# Patient Record
Sex: FEMALE | Race: WHITE | Employment: OTHER | URBAN - METROPOLITAN AREA
[De-identification: names, ages, dates, MRNs, and addresses within clinical notes are randomized per-mention and may not be internally consistent; named-entity substitution may affect disease eponyms.]

---

## 2018-04-01 ENCOUNTER — APPOINTMENT (OUTPATIENT)
Dept: GENERAL RADIOLOGY | Age: 73
DRG: 193 | End: 2018-04-01
Payer: MEDICARE

## 2018-04-01 ENCOUNTER — HOSPITAL ENCOUNTER (INPATIENT)
Age: 73
LOS: 2 days | Discharge: HOME HEALTH | DRG: 193 | End: 2018-04-03
Attending: EMERGENCY MEDICINE | Admitting: INTERNAL MEDICINE
Payer: MEDICARE

## 2018-04-01 ENCOUNTER — APPOINTMENT (OUTPATIENT)
Dept: CT IMAGING | Age: 73
DRG: 193 | End: 2018-04-01
Payer: MEDICARE

## 2018-04-01 DIAGNOSIS — J18.9 COMMUNITY ACQUIRED PNEUMONIA, UNSPECIFIED LATERALITY: ICD-10-CM

## 2018-04-01 DIAGNOSIS — E87.6 HYPOKALEMIA: ICD-10-CM

## 2018-04-01 DIAGNOSIS — J96.01 ACUTE RESPIRATORY FAILURE WITH HYPOXIA (HCC): Primary | ICD-10-CM

## 2018-04-01 LAB
ALBUMIN SERPL-MCNC: 4.3 G/DL (ref 3.5–5.2)
ALP BLD-CCNC: 127 U/L (ref 35–104)
ALT SERPL-CCNC: 24 U/L (ref 0–32)
ANION GAP SERPL CALCULATED.3IONS-SCNC: 12 MMOL/L (ref 7–16)
AST SERPL-CCNC: 23 U/L (ref 0–31)
BACTERIA: NORMAL /HPF
BASOPHILS ABSOLUTE: 0.06 E9/L (ref 0–0.2)
BASOPHILS RELATIVE PERCENT: 0.3 % (ref 0–2)
BILIRUB SERPL-MCNC: 1.1 MG/DL (ref 0–1.2)
BILIRUBIN URINE: NEGATIVE
BLOOD, URINE: ABNORMAL
BUN BLDV-MCNC: 13 MG/DL (ref 8–23)
CALCIUM SERPL-MCNC: 9.7 MG/DL (ref 8.6–10.2)
CHLORIDE BLD-SCNC: 96 MMOL/L (ref 98–107)
CLARITY: CLEAR
CO2: 29 MMOL/L (ref 22–29)
COLOR: YELLOW
CREAT SERPL-MCNC: 0.5 MG/DL (ref 0.5–1)
EOSINOPHILS ABSOLUTE: 0 E9/L (ref 0.05–0.5)
EOSINOPHILS RELATIVE PERCENT: 0 % (ref 0–6)
EPITHELIAL CELLS, UA: NORMAL /HPF
GFR AFRICAN AMERICAN: >60
GFR NON-AFRICAN AMERICAN: >60 ML/MIN/1.73
GLUCOSE BLD-MCNC: 128 MG/DL (ref 74–109)
GLUCOSE URINE: NEGATIVE MG/DL
HCT VFR BLD CALC: 46.1 % (ref 34–48)
HEMOGLOBIN: 15.6 G/DL (ref 11.5–15.5)
IMMATURE GRANULOCYTES #: 0.14 E9/L
IMMATURE GRANULOCYTES %: 0.7 % (ref 0–5)
INFLUENZA A BY PCR: NOT DETECTED
INFLUENZA B BY PCR: NOT DETECTED
KETONES, URINE: NEGATIVE MG/DL
LACTIC ACID: 1.3 MMOL/L (ref 0.5–2.2)
LEUKOCYTE ESTERASE, URINE: NEGATIVE
LIPASE: 14 U/L (ref 13–60)
LYMPHOCYTES ABSOLUTE: 2.57 E9/L (ref 1.5–4)
LYMPHOCYTES RELATIVE PERCENT: 12.7 % (ref 20–42)
MCH RBC QN AUTO: 30.5 PG (ref 26–35)
MCHC RBC AUTO-ENTMCNC: 33.8 % (ref 32–34.5)
MCV RBC AUTO: 90 FL (ref 80–99.9)
MONOCYTES ABSOLUTE: 1.49 E9/L (ref 0.1–0.95)
MONOCYTES RELATIVE PERCENT: 7.4 % (ref 2–12)
NEUTROPHILS ABSOLUTE: 15.91 E9/L (ref 1.8–7.3)
NEUTROPHILS RELATIVE PERCENT: 78.9 % (ref 43–80)
NITRITE, URINE: NEGATIVE
PDW BLD-RTO: 13.3 FL (ref 11.5–15)
PH UA: 8 (ref 5–9)
PLATELET # BLD: 328 E9/L (ref 130–450)
PMV BLD AUTO: 10.4 FL (ref 7–12)
POTASSIUM SERPL-SCNC: 3 MMOL/L (ref 3.5–5)
PROTEIN UA: ABNORMAL MG/DL
RBC # BLD: 5.12 E12/L (ref 3.5–5.5)
RBC UA: NORMAL /HPF (ref 0–2)
SODIUM BLD-SCNC: 137 MMOL/L (ref 132–146)
SPECIFIC GRAVITY UA: 1.01 (ref 1–1.03)
TOTAL PROTEIN: 7.8 G/DL (ref 6.4–8.3)
TROPONIN: <0.01 NG/ML (ref 0–0.03)
UROBILINOGEN, URINE: 0.2 E.U./DL
WBC # BLD: 20.2 E9/L (ref 4.5–11.5)
WBC UA: NORMAL /HPF (ref 0–5)

## 2018-04-01 PROCEDURE — 96365 THER/PROPH/DIAG IV INF INIT: CPT

## 2018-04-01 PROCEDURE — 85025 COMPLETE CBC W/AUTO DIFF WBC: CPT

## 2018-04-01 PROCEDURE — 71046 X-RAY EXAM CHEST 2 VIEWS: CPT

## 2018-04-01 PROCEDURE — 6370000000 HC RX 637 (ALT 250 FOR IP): Performed by: EMERGENCY MEDICINE

## 2018-04-01 PROCEDURE — 84484 ASSAY OF TROPONIN QUANT: CPT

## 2018-04-01 PROCEDURE — 87040 BLOOD CULTURE FOR BACTERIA: CPT

## 2018-04-01 PROCEDURE — 96375 TX/PRO/DX INJ NEW DRUG ADDON: CPT

## 2018-04-01 PROCEDURE — 80053 COMPREHEN METABOLIC PANEL: CPT

## 2018-04-01 PROCEDURE — 6360000004 HC RX CONTRAST MEDICATION: Performed by: RADIOLOGY

## 2018-04-01 PROCEDURE — 6370000000 HC RX 637 (ALT 250 FOR IP): Performed by: FAMILY MEDICINE

## 2018-04-01 PROCEDURE — 87502 INFLUENZA DNA AMP PROBE: CPT

## 2018-04-01 PROCEDURE — 87081 CULTURE SCREEN ONLY: CPT

## 2018-04-01 PROCEDURE — 36415 COLL VENOUS BLD VENIPUNCTURE: CPT

## 2018-04-01 PROCEDURE — 1200000000 HC SEMI PRIVATE

## 2018-04-01 PROCEDURE — 87088 URINE BACTERIA CULTURE: CPT

## 2018-04-01 PROCEDURE — 81001 URINALYSIS AUTO W/SCOPE: CPT

## 2018-04-01 PROCEDURE — 87450 HC DIRECT STREP B ANTIGEN: CPT

## 2018-04-01 PROCEDURE — 6360000002 HC RX W HCPCS: Performed by: EMERGENCY MEDICINE

## 2018-04-01 PROCEDURE — 71275 CT ANGIOGRAPHY CHEST: CPT

## 2018-04-01 PROCEDURE — 2580000003 HC RX 258: Performed by: EMERGENCY MEDICINE

## 2018-04-01 PROCEDURE — 99285 EMERGENCY DEPT VISIT HI MDM: CPT

## 2018-04-01 PROCEDURE — 83605 ASSAY OF LACTIC ACID: CPT

## 2018-04-01 PROCEDURE — 83690 ASSAY OF LIPASE: CPT

## 2018-04-01 PROCEDURE — 2500000003 HC RX 250 WO HCPCS: Performed by: EMERGENCY MEDICINE

## 2018-04-01 RX ORDER — ATORVASTATIN CALCIUM 20 MG/1
20 TABLET, FILM COATED ORAL DAILY
Status: DISCONTINUED | OUTPATIENT
Start: 2018-04-01 | End: 2018-04-03 | Stop reason: HOSPADM

## 2018-04-01 RX ORDER — METHYLPREDNISOLONE SODIUM SUCCINATE 125 MG/2ML
125 INJECTION, POWDER, LYOPHILIZED, FOR SOLUTION INTRAMUSCULAR; INTRAVENOUS ONCE
Status: COMPLETED | OUTPATIENT
Start: 2018-04-01 | End: 2018-04-01

## 2018-04-01 RX ORDER — LANOLIN ALCOHOL/MO/W.PET/CERES
3 CREAM (GRAM) TOPICAL
Status: ACTIVE | OUTPATIENT
Start: 2018-04-01 | End: 2018-04-01

## 2018-04-01 RX ORDER — KETOROLAC TROMETHAMINE 15 MG/ML
15 INJECTION, SOLUTION INTRAMUSCULAR; INTRAVENOUS ONCE
Status: COMPLETED | OUTPATIENT
Start: 2018-04-01 | End: 2018-04-01

## 2018-04-01 RX ORDER — DIPHENHYDRAMINE HYDROCHLORIDE 50 MG/ML
50 INJECTION INTRAMUSCULAR; INTRAVENOUS ONCE
Status: COMPLETED | OUTPATIENT
Start: 2018-04-01 | End: 2018-04-01

## 2018-04-01 RX ORDER — POTASSIUM CHLORIDE 7.45 MG/ML
10 INJECTION INTRAVENOUS ONCE
Status: COMPLETED | OUTPATIENT
Start: 2018-04-01 | End: 2018-04-01

## 2018-04-01 RX ORDER — HYDROCODONE BITARTRATE AND ACETAMINOPHEN 10; 325 MG/1; MG/1
1 TABLET ORAL EVERY 6 HOURS PRN
Status: DISCONTINUED | OUTPATIENT
Start: 2018-04-01 | End: 2018-04-03 | Stop reason: HOSPADM

## 2018-04-01 RX ORDER — POTASSIUM BICARBONATE 25 MEQ/1
50 TABLET, EFFERVESCENT ORAL ONCE
Status: COMPLETED | OUTPATIENT
Start: 2018-04-01 | End: 2018-04-01

## 2018-04-01 RX ORDER — LEVOTHYROXINE SODIUM 0.07 MG/1
75 TABLET ORAL DAILY
Status: DISCONTINUED | OUTPATIENT
Start: 2018-04-01 | End: 2018-04-03 | Stop reason: HOSPADM

## 2018-04-01 RX ORDER — LISINOPRIL AND HYDROCHLOROTHIAZIDE 20; 12.5 MG/1; MG/1
1 TABLET ORAL DAILY
Status: DISCONTINUED | OUTPATIENT
Start: 2018-04-01 | End: 2018-04-03 | Stop reason: HOSPADM

## 2018-04-01 RX ORDER — PANTOPRAZOLE SODIUM 40 MG/1
40 TABLET, DELAYED RELEASE ORAL
Status: DISCONTINUED | OUTPATIENT
Start: 2018-04-02 | End: 2018-04-03 | Stop reason: HOSPADM

## 2018-04-01 RX ORDER — IPRATROPIUM BROMIDE AND ALBUTEROL SULFATE 2.5; .5 MG/3ML; MG/3ML
1 SOLUTION RESPIRATORY (INHALATION) EVERY 4 HOURS PRN
Status: DISCONTINUED | OUTPATIENT
Start: 2018-04-01 | End: 2018-04-02

## 2018-04-01 RX ORDER — MORPHINE SULFATE 5 MG/ML
4 INJECTION, SOLUTION INTRAMUSCULAR; INTRAVENOUS ONCE
Status: COMPLETED | OUTPATIENT
Start: 2018-04-01 | End: 2018-04-01

## 2018-04-01 RX ORDER — POTASSIUM CHLORIDE 20 MEQ/1
20 TABLET, EXTENDED RELEASE ORAL DAILY
COMMUNITY
End: 2020-10-30

## 2018-04-01 RX ORDER — POTASSIUM CHLORIDE 20 MEQ/1
20 TABLET, EXTENDED RELEASE ORAL DAILY
Status: DISCONTINUED | OUTPATIENT
Start: 2018-04-01 | End: 2018-04-03 | Stop reason: HOSPADM

## 2018-04-01 RX ADMIN — ATORVASTATIN CALCIUM 20 MG: 20 TABLET, FILM COATED ORAL at 15:44

## 2018-04-01 RX ADMIN — MORPHINE SULFATE 4 MG: 5 INJECTION, SOLUTION INTRAMUSCULAR; INTRAVENOUS at 09:25

## 2018-04-01 RX ADMIN — DIPHENHYDRAMINE HYDROCHLORIDE 50 MG: 50 INJECTION, SOLUTION INTRAMUSCULAR; INTRAVENOUS at 10:39

## 2018-04-01 RX ADMIN — DOXYCYCLINE 100 MG: 100 INJECTION, POWDER, LYOPHILIZED, FOR SOLUTION INTRAVENOUS at 13:13

## 2018-04-01 RX ADMIN — POTASSIUM BICARBONATE 50 MEQ: 25 TABLET, EFFERVESCENT ORAL at 10:50

## 2018-04-01 RX ADMIN — HYDROCODONE BITARTRATE AND ACETAMINOPHEN 1 TABLET: 10; 325 TABLET ORAL at 18:27

## 2018-04-01 RX ADMIN — LEVOTHYROXINE SODIUM 75 MCG: 75 TABLET ORAL at 15:44

## 2018-04-01 RX ADMIN — POTASSIUM CHLORIDE 10 MEQ: 10 INJECTION, SOLUTION INTRAVENOUS at 10:50

## 2018-04-01 RX ADMIN — KETOROLAC TROMETHAMINE 15 MG: 15 INJECTION, SOLUTION INTRAMUSCULAR; INTRAVENOUS at 09:25

## 2018-04-01 RX ADMIN — METHYLPREDNISOLONE SODIUM SUCCINATE 125 MG: 125 INJECTION, POWDER, FOR SOLUTION INTRAMUSCULAR; INTRAVENOUS at 10:39

## 2018-04-01 RX ADMIN — POTASSIUM CHLORIDE 20 MEQ: 20 TABLET, EXTENDED RELEASE ORAL at 14:52

## 2018-04-01 RX ADMIN — WATER 2 G: 1 INJECTION INTRAMUSCULAR; INTRAVENOUS; SUBCUTANEOUS at 13:01

## 2018-04-01 RX ADMIN — IOPAMIDOL 80 ML: 755 INJECTION, SOLUTION INTRAVENOUS at 12:10

## 2018-04-01 RX ADMIN — LISINOPRIL AND HYDROCHLOROTHIAZIDE 1 TABLET: 12.5; 2 TABLET ORAL at 15:44

## 2018-04-01 ASSESSMENT — ENCOUNTER SYMPTOMS
VOMITING: 0
COUGH: 0
CHEST TIGHTNESS: 0
WHEEZING: 0
SORE THROAT: 0
SHORTNESS OF BREATH: 0
CONSTIPATION: 0
BLOOD IN STOOL: 0
ABDOMINAL PAIN: 0
TROUBLE SWALLOWING: 0
NAUSEA: 0
DIARRHEA: 0
RHINORRHEA: 0

## 2018-04-01 ASSESSMENT — PAIN DESCRIPTION - DESCRIPTORS
DESCRIPTORS: ACHING;DISCOMFORT;DULL
DESCRIPTORS: ACHING;DISCOMFORT;NAGGING
DESCRIPTORS: SHARP

## 2018-04-01 ASSESSMENT — PAIN DESCRIPTION - PAIN TYPE
TYPE: ACUTE PAIN

## 2018-04-01 ASSESSMENT — PAIN DESCRIPTION - ORIENTATION
ORIENTATION: RIGHT;LEFT
ORIENTATION: RIGHT;LEFT

## 2018-04-01 ASSESSMENT — PAIN DESCRIPTION - ONSET
ONSET: ON-GOING
ONSET: ON-GOING

## 2018-04-01 ASSESSMENT — PAIN SCALES - GENERAL
PAINLEVEL_OUTOF10: 10
PAINLEVEL_OUTOF10: 9
PAINLEVEL_OUTOF10: 10
PAINLEVEL_OUTOF10: 5
PAINLEVEL_OUTOF10: 4

## 2018-04-01 ASSESSMENT — PAIN DESCRIPTION - LOCATION
LOCATION: RIB CAGE
LOCATION: CHEST
LOCATION: CHEST
LOCATION: RIB CAGE

## 2018-04-01 ASSESSMENT — PAIN DESCRIPTION - FREQUENCY
FREQUENCY: CONTINUOUS

## 2018-04-02 ENCOUNTER — APPOINTMENT (OUTPATIENT)
Dept: GENERAL RADIOLOGY | Age: 73
DRG: 193 | End: 2018-04-02
Payer: MEDICARE

## 2018-04-02 LAB
ALBUMIN SERPL-MCNC: 3.7 G/DL (ref 3.5–5.2)
ALP BLD-CCNC: 120 U/L (ref 35–104)
ALT SERPL-CCNC: 51 U/L (ref 0–32)
ANION GAP SERPL CALCULATED.3IONS-SCNC: 12 MMOL/L (ref 7–16)
AST SERPL-CCNC: 35 U/L (ref 0–31)
BASOPHILS ABSOLUTE: 0 E9/L (ref 0–0.2)
BASOPHILS RELATIVE PERCENT: 0.2 % (ref 0–2)
BILIRUB SERPL-MCNC: 0.5 MG/DL (ref 0–1.2)
BUN BLDV-MCNC: 18 MG/DL (ref 8–23)
CALCIUM SERPL-MCNC: 9.6 MG/DL (ref 8.6–10.2)
CHLORIDE BLD-SCNC: 98 MMOL/L (ref 98–107)
CO2: 27 MMOL/L (ref 22–29)
CREAT SERPL-MCNC: 0.5 MG/DL (ref 0.5–1)
EOSINOPHILS ABSOLUTE: 0 E9/L (ref 0.05–0.5)
EOSINOPHILS RELATIVE PERCENT: 0 % (ref 0–6)
FILM ARRAY ADENOVIRUS: NORMAL
FILM ARRAY BORDETELLA PERTUSSIS: NORMAL
FILM ARRAY CHLAMYDOPHILIA PNEUMONIAE: NORMAL
FILM ARRAY CORONAVIRUS 229E: NORMAL
FILM ARRAY CORONAVIRUS HKU1: NORMAL
FILM ARRAY CORONAVIRUS NL63: NORMAL
FILM ARRAY CORONAVIRUS OC43: NORMAL
FILM ARRAY INFLUENZA A VIRUS 09H1: NORMAL
FILM ARRAY INFLUENZA A VIRUS H1: NORMAL
FILM ARRAY INFLUENZA A VIRUS H3: NORMAL
FILM ARRAY INFLUENZA A VIRUS: NORMAL
FILM ARRAY INFLUENZA B: NORMAL
FILM ARRAY METAPNEUMOVIRUS: NORMAL
FILM ARRAY MYCOPLASMA PNEUMONIAE: NORMAL
FILM ARRAY PARAINFLUENZA VIRUS 1: NORMAL
FILM ARRAY PARAINFLUENZA VIRUS 2: NORMAL
FILM ARRAY PARAINFLUENZA VIRUS 3: NORMAL
FILM ARRAY PARAINFLUENZA VIRUS 4: NORMAL
FILM ARRAY RESPIRATORY SYNCITIAL VIRUS: NORMAL
FILM ARRAY RHINOVIRUS/ENTEROVIRUS: NORMAL
GAMMA GLUTAMYL TRANSFERASE: 65 U/L (ref 6–42)
GFR AFRICAN AMERICAN: >60
GFR NON-AFRICAN AMERICAN: >60 ML/MIN/1.73
GLUCOSE BLD-MCNC: 123 MG/DL (ref 74–109)
HCT VFR BLD CALC: 42.3 % (ref 34–48)
HEMOGLOBIN: 14.3 G/DL (ref 11.5–15.5)
INFLUENZA A BY PCR: NOT DETECTED
INFLUENZA B BY PCR: NOT DETECTED
L. PNEUMOPHILA SEROGP 1 UR AG: NORMAL
LYMPHOCYTES ABSOLUTE: 3.03 E9/L (ref 1.5–4)
LYMPHOCYTES RELATIVE PERCENT: 11 % (ref 20–42)
MCH RBC QN AUTO: 30.6 PG (ref 26–35)
MCHC RBC AUTO-ENTMCNC: 33.8 % (ref 32–34.5)
MCV RBC AUTO: 90.6 FL (ref 80–99.9)
MONOCYTES ABSOLUTE: 1.1 E9/L (ref 0.1–0.95)
MONOCYTES RELATIVE PERCENT: 3.5 % (ref 2–12)
NEUTROPHILS ABSOLUTE: 23.65 E9/L (ref 1.8–7.3)
NEUTROPHILS RELATIVE PERCENT: 85.5 % (ref 43–80)
PDW BLD-RTO: 13.4 FL (ref 11.5–15)
PLATELET # BLD: 332 E9/L (ref 130–450)
PMV BLD AUTO: 10.7 FL (ref 7–12)
POIKILOCYTES: ABNORMAL
POTASSIUM SERPL-SCNC: 3.6 MMOL/L (ref 3.5–5)
PROCALCITONIN: 0.06 NG/ML (ref 0–0.08)
RBC # BLD: 4.67 E12/L (ref 3.5–5.5)
SODIUM BLD-SCNC: 137 MMOL/L (ref 132–146)
STREP PNEUMONIAE ANTIGEN, URINE: NORMAL
TARGET CELLS: ABNORMAL
TOTAL PROTEIN: 7.2 G/DL (ref 6.4–8.3)
TSH SERPL DL<=0.05 MIU/L-ACNC: 1.24 UIU/ML (ref 0.27–4.2)
WBC # BLD: 27.5 E9/L (ref 4.5–11.5)

## 2018-04-02 PROCEDURE — 87581 M.PNEUMON DNA AMP PROBE: CPT

## 2018-04-02 PROCEDURE — 87486 CHLMYD PNEUM DNA AMP PROBE: CPT

## 2018-04-02 PROCEDURE — 36415 COLL VENOUS BLD VENIPUNCTURE: CPT

## 2018-04-02 PROCEDURE — 6370000000 HC RX 637 (ALT 250 FOR IP): Performed by: INTERNAL MEDICINE

## 2018-04-02 PROCEDURE — 82977 ASSAY OF GGT: CPT

## 2018-04-02 PROCEDURE — 2500000003 HC RX 250 WO HCPCS: Performed by: INTERNAL MEDICINE

## 2018-04-02 PROCEDURE — 2580000003 HC RX 258: Performed by: INTERNAL MEDICINE

## 2018-04-02 PROCEDURE — 87503 INFLUENZA DNA AMP PROB ADDL: CPT

## 2018-04-02 PROCEDURE — 1200000000 HC SEMI PRIVATE

## 2018-04-02 PROCEDURE — 6370000000 HC RX 637 (ALT 250 FOR IP): Performed by: FAMILY MEDICINE

## 2018-04-02 PROCEDURE — 87798 DETECT AGENT NOS DNA AMP: CPT

## 2018-04-02 PROCEDURE — 87501 INFLUENZA DNA AMP PROB 1+: CPT

## 2018-04-02 PROCEDURE — 85025 COMPLETE CBC W/AUTO DIFF WBC: CPT

## 2018-04-02 PROCEDURE — 80074 ACUTE HEPATITIS PANEL: CPT

## 2018-04-02 PROCEDURE — 80053 COMPREHEN METABOLIC PANEL: CPT

## 2018-04-02 PROCEDURE — 84443 ASSAY THYROID STIM HORMONE: CPT

## 2018-04-02 PROCEDURE — 71046 X-RAY EXAM CHEST 2 VIEWS: CPT

## 2018-04-02 PROCEDURE — 84145 PROCALCITONIN (PCT): CPT

## 2018-04-02 PROCEDURE — 6360000002 HC RX W HCPCS: Performed by: INTERNAL MEDICINE

## 2018-04-02 PROCEDURE — 94640 AIRWAY INHALATION TREATMENT: CPT

## 2018-04-02 RX ORDER — IPRATROPIUM BROMIDE AND ALBUTEROL SULFATE 2.5; .5 MG/3ML; MG/3ML
1 SOLUTION RESPIRATORY (INHALATION) EVERY 4 HOURS
Status: DISCONTINUED | OUTPATIENT
Start: 2018-04-02 | End: 2018-04-03 | Stop reason: HOSPADM

## 2018-04-02 RX ADMIN — LISINOPRIL AND HYDROCHLOROTHIAZIDE 1 TABLET: 12.5; 2 TABLET ORAL at 09:33

## 2018-04-02 RX ADMIN — IPRATROPIUM BROMIDE AND ALBUTEROL SULFATE 1 AMPULE: .5; 3 SOLUTION RESPIRATORY (INHALATION) at 17:35

## 2018-04-02 RX ADMIN — POTASSIUM CHLORIDE 20 MEQ: 20 TABLET, EXTENDED RELEASE ORAL at 09:25

## 2018-04-02 RX ADMIN — IPRATROPIUM BROMIDE AND ALBUTEROL SULFATE 1 AMPULE: .5; 3 SOLUTION RESPIRATORY (INHALATION) at 14:18

## 2018-04-02 RX ADMIN — LEVOTHYROXINE SODIUM 75 MCG: 75 TABLET ORAL at 06:28

## 2018-04-02 RX ADMIN — HYDROCODONE BITARTRATE AND ACETAMINOPHEN 1 TABLET: 10; 325 TABLET ORAL at 09:34

## 2018-04-02 RX ADMIN — ATORVASTATIN CALCIUM 20 MG: 20 TABLET, FILM COATED ORAL at 09:24

## 2018-04-02 RX ADMIN — CEFTRIAXONE 1 G: 1 INJECTION, POWDER, FOR SOLUTION INTRAMUSCULAR; INTRAVENOUS at 23:13

## 2018-04-02 RX ADMIN — HYDROCODONE BITARTRATE AND ACETAMINOPHEN 1 TABLET: 10; 325 TABLET ORAL at 19:02

## 2018-04-02 RX ADMIN — PANTOPRAZOLE SODIUM 40 MG: 40 TABLET, DELAYED RELEASE ORAL at 06:28

## 2018-04-02 RX ADMIN — DOXYCYCLINE 100 MG: 100 INJECTION, POWDER, LYOPHILIZED, FOR SOLUTION INTRAVENOUS at 11:02

## 2018-04-02 RX ADMIN — IPRATROPIUM BROMIDE AND ALBUTEROL SULFATE 1 AMPULE: .5; 3 SOLUTION RESPIRATORY (INHALATION) at 23:25

## 2018-04-02 ASSESSMENT — PAIN DESCRIPTION - FREQUENCY
FREQUENCY: INTERMITTENT
FREQUENCY: INTERMITTENT

## 2018-04-02 ASSESSMENT — PAIN DESCRIPTION - LOCATION
LOCATION: ABDOMEN;RIB CAGE
LOCATION: ABDOMEN

## 2018-04-02 ASSESSMENT — PAIN SCALES - GENERAL
PAINLEVEL_OUTOF10: 0
PAINLEVEL_OUTOF10: 0
PAINLEVEL_OUTOF10: 9
PAINLEVEL_OUTOF10: 3
PAINLEVEL_OUTOF10: 8

## 2018-04-02 ASSESSMENT — PAIN DESCRIPTION - PROGRESSION: CLINICAL_PROGRESSION: GRADUALLY WORSENING

## 2018-04-02 ASSESSMENT — PAIN DESCRIPTION - PAIN TYPE
TYPE: ACUTE PAIN
TYPE: ACUTE PAIN

## 2018-04-02 ASSESSMENT — PAIN DESCRIPTION - DESCRIPTORS
DESCRIPTORS: ACHING;DISCOMFORT;DULL
DESCRIPTORS: ACHING;DULL

## 2018-04-02 ASSESSMENT — PAIN DESCRIPTION - ORIENTATION
ORIENTATION: RIGHT;LEFT
ORIENTATION: RIGHT;LEFT

## 2018-04-02 ASSESSMENT — PAIN DESCRIPTION - ONSET
ONSET: ON-GOING
ONSET: ON-GOING

## 2018-04-03 VITALS
RESPIRATION RATE: 20 BRPM | OXYGEN SATURATION: 93 % | TEMPERATURE: 98.6 F | SYSTOLIC BLOOD PRESSURE: 128 MMHG | HEART RATE: 90 BPM | WEIGHT: 115.5 LBS | DIASTOLIC BLOOD PRESSURE: 68 MMHG | BODY MASS INDEX: 24.24 KG/M2 | HEIGHT: 58 IN

## 2018-04-03 LAB
ALBUMIN SERPL-MCNC: 3.4 G/DL (ref 3.5–5.2)
ALP BLD-CCNC: 103 U/L (ref 35–104)
ALT SERPL-CCNC: 41 U/L (ref 0–32)
ANION GAP SERPL CALCULATED.3IONS-SCNC: 10 MMOL/L (ref 7–16)
AST SERPL-CCNC: 28 U/L (ref 0–31)
BILIRUB SERPL-MCNC: 0.4 MG/DL (ref 0–1.2)
BUN BLDV-MCNC: 17 MG/DL (ref 8–23)
CALCIUM SERPL-MCNC: 8.8 MG/DL (ref 8.6–10.2)
CHLORIDE BLD-SCNC: 102 MMOL/L (ref 98–107)
CO2: 31 MMOL/L (ref 22–29)
CREAT SERPL-MCNC: 0.6 MG/DL (ref 0.5–1)
GFR AFRICAN AMERICAN: >60
GFR NON-AFRICAN AMERICAN: >60 ML/MIN/1.73
GLUCOSE BLD-MCNC: 90 MG/DL (ref 74–109)
HAV IGM SER IA-ACNC: NORMAL
HCT VFR BLD CALC: 38.8 % (ref 34–48)
HEMOGLOBIN: 12.8 G/DL (ref 11.5–15.5)
HEPATITIS B CORE IGM ANTIBODY: NORMAL
HEPATITIS B SURFACE ANTIGEN INTERPRETATION: NORMAL
HEPATITIS C ANTIBODY INTERPRETATION: NORMAL
MCH RBC QN AUTO: 30.5 PG (ref 26–35)
MCHC RBC AUTO-ENTMCNC: 33 % (ref 32–34.5)
MCV RBC AUTO: 92.6 FL (ref 80–99.9)
MRSA CULTURE ONLY: NORMAL
PDW BLD-RTO: 13.6 FL (ref 11.5–15)
PLATELET # BLD: 317 E9/L (ref 130–450)
PMV BLD AUTO: 10.8 FL (ref 7–12)
POTASSIUM SERPL-SCNC: 3.5 MMOL/L (ref 3.5–5)
RBC # BLD: 4.19 E12/L (ref 3.5–5.5)
SODIUM BLD-SCNC: 143 MMOL/L (ref 132–146)
TOTAL PROTEIN: 6.4 G/DL (ref 6.4–8.3)
URINE CULTURE, ROUTINE: NORMAL
WBC # BLD: 21.4 E9/L (ref 4.5–11.5)

## 2018-04-03 PROCEDURE — 2580000003 HC RX 258: Performed by: INTERNAL MEDICINE

## 2018-04-03 PROCEDURE — 85027 COMPLETE CBC AUTOMATED: CPT

## 2018-04-03 PROCEDURE — 36415 COLL VENOUS BLD VENIPUNCTURE: CPT

## 2018-04-03 PROCEDURE — 2500000003 HC RX 250 WO HCPCS: Performed by: INTERNAL MEDICINE

## 2018-04-03 PROCEDURE — 80053 COMPREHEN METABOLIC PANEL: CPT

## 2018-04-03 PROCEDURE — 94640 AIRWAY INHALATION TREATMENT: CPT

## 2018-04-03 PROCEDURE — 6370000000 HC RX 637 (ALT 250 FOR IP): Performed by: FAMILY MEDICINE

## 2018-04-03 RX ORDER — LEVOFLOXACIN 500 MG/1
500 TABLET, FILM COATED ORAL DAILY
Qty: 7 TABLET | Refills: 0 | Status: SHIPPED | OUTPATIENT
Start: 2018-04-03 | End: 2018-04-10

## 2018-04-03 RX ORDER — ALBUTEROL SULFATE 90 UG/1
2 AEROSOL, METERED RESPIRATORY (INHALATION) EVERY 4 HOURS PRN
Qty: 1 INHALER | Refills: 3 | Status: SHIPPED | OUTPATIENT
Start: 2018-04-03 | End: 2020-10-30

## 2018-04-03 RX ADMIN — ATORVASTATIN CALCIUM 20 MG: 20 TABLET, FILM COATED ORAL at 08:36

## 2018-04-03 RX ADMIN — PANTOPRAZOLE SODIUM 40 MG: 40 TABLET, DELAYED RELEASE ORAL at 06:13

## 2018-04-03 RX ADMIN — HYDROCODONE BITARTRATE AND ACETAMINOPHEN 1 TABLET: 10; 325 TABLET ORAL at 01:59

## 2018-04-03 RX ADMIN — IPRATROPIUM BROMIDE AND ALBUTEROL SULFATE 1 AMPULE: .5; 3 SOLUTION RESPIRATORY (INHALATION) at 06:32

## 2018-04-03 RX ADMIN — HYDROCODONE BITARTRATE AND ACETAMINOPHEN 1 TABLET: 10; 325 TABLET ORAL at 08:35

## 2018-04-03 RX ADMIN — IPRATROPIUM BROMIDE AND ALBUTEROL SULFATE 1 AMPULE: .5; 3 SOLUTION RESPIRATORY (INHALATION) at 09:36

## 2018-04-03 RX ADMIN — POTASSIUM CHLORIDE 20 MEQ: 20 TABLET, EXTENDED RELEASE ORAL at 08:36

## 2018-04-03 RX ADMIN — IPRATROPIUM BROMIDE AND ALBUTEROL SULFATE 1 AMPULE: .5; 3 SOLUTION RESPIRATORY (INHALATION) at 13:41

## 2018-04-03 RX ADMIN — HYDROCODONE BITARTRATE AND ACETAMINOPHEN 1 TABLET: 10; 325 TABLET ORAL at 14:22

## 2018-04-03 RX ADMIN — LEVOTHYROXINE SODIUM 75 MCG: 75 TABLET ORAL at 06:13

## 2018-04-03 RX ADMIN — LISINOPRIL AND HYDROCHLOROTHIAZIDE 1 TABLET: 12.5; 2 TABLET ORAL at 08:36

## 2018-04-03 ASSESSMENT — PAIN DESCRIPTION - PROGRESSION
CLINICAL_PROGRESSION: GRADUALLY WORSENING
CLINICAL_PROGRESSION: NOT CHANGED

## 2018-04-03 ASSESSMENT — PAIN DESCRIPTION - ONSET
ONSET: ON-GOING

## 2018-04-03 ASSESSMENT — PAIN DESCRIPTION - LOCATION
LOCATION: NECK;SHOULDER

## 2018-04-03 ASSESSMENT — PAIN DESCRIPTION - PAIN TYPE
TYPE: ACUTE PAIN

## 2018-04-03 ASSESSMENT — PAIN DESCRIPTION - ORIENTATION
ORIENTATION: RIGHT;LEFT

## 2018-04-03 ASSESSMENT — PAIN SCALES - GENERAL
PAINLEVEL_OUTOF10: 9
PAINLEVEL_OUTOF10: 7
PAINLEVEL_OUTOF10: 0
PAINLEVEL_OUTOF10: 7
PAINLEVEL_OUTOF10: 5
PAINLEVEL_OUTOF10: 3
PAINLEVEL_OUTOF10: 9
PAINLEVEL_OUTOF10: 0

## 2018-04-03 ASSESSMENT — PAIN DESCRIPTION - DESCRIPTORS
DESCRIPTORS: ACHING;DULL
DESCRIPTORS: ACHING;DULL;HEADACHE;DISCOMFORT
DESCRIPTORS: ACHING;DISCOMFORT;DULL;HEADACHE
DESCRIPTORS: ACHING;DULL;DISCOMFORT

## 2018-04-03 ASSESSMENT — PAIN DESCRIPTION - FREQUENCY
FREQUENCY: INTERMITTENT

## 2018-04-06 LAB
BLOOD CULTURE, ROUTINE: NORMAL
CULTURE, BLOOD 2: NORMAL

## 2018-05-03 LAB
EKG ATRIAL RATE: 85 BPM
EKG P AXIS: 26 DEGREES
EKG P-R INTERVAL: 144 MS
EKG Q-T INTERVAL: 396 MS
EKG QRS DURATION: 80 MS
EKG QTC CALCULATION (BAZETT): 471 MS
EKG R AXIS: -34 DEGREES
EKG T AXIS: -9 DEGREES
EKG VENTRICULAR RATE: 85 BPM

## 2020-10-30 ENCOUNTER — OFFICE VISIT (OUTPATIENT)
Dept: FAMILY MEDICINE CLINIC | Age: 75
End: 2020-10-30
Payer: MEDICARE

## 2020-10-30 VITALS
TEMPERATURE: 97.3 F | RESPIRATION RATE: 22 BRPM | OXYGEN SATURATION: 94 % | BODY MASS INDEX: 24.75 KG/M2 | DIASTOLIC BLOOD PRESSURE: 106 MMHG | HEIGHT: 57 IN | SYSTOLIC BLOOD PRESSURE: 158 MMHG | HEART RATE: 112 BPM | WEIGHT: 114.7 LBS

## 2020-10-30 PROCEDURE — 99203 OFFICE O/P NEW LOW 30 MIN: CPT | Performed by: FAMILY MEDICINE

## 2020-10-30 RX ORDER — RIZATRIPTAN BENZOATE 10 MG/1
10 TABLET ORAL
COMMUNITY
End: 2021-02-24 | Stop reason: SDUPTHER

## 2020-10-30 RX ORDER — DULOXETIN HYDROCHLORIDE 30 MG/1
30 CAPSULE, DELAYED RELEASE ORAL DAILY
Qty: 30 CAPSULE | Refills: 2 | Status: SHIPPED
Start: 2020-10-30 | End: 2020-11-06 | Stop reason: ALTCHOICE

## 2020-10-30 ASSESSMENT — PATIENT HEALTH QUESTIONNAIRE - PHQ9
SUM OF ALL RESPONSES TO PHQ QUESTIONS 1-9: 2
2. FEELING DOWN, DEPRESSED OR HOPELESS: 1
SUM OF ALL RESPONSES TO PHQ QUESTIONS 1-9: 2
SUM OF ALL RESPONSES TO PHQ9 QUESTIONS 1 & 2: 2
1. LITTLE INTEREST OR PLEASURE IN DOING THINGS: 1
SUM OF ALL RESPONSES TO PHQ QUESTIONS 1-9: 2

## 2020-10-30 NOTE — PROGRESS NOTES
Subjective:  70 y.o. female who presents to the office today with chief complaint:  Chief Complaint   Patient presents with    New Patient     Previously seeing Dr. Jen madison. Neck pain: Chronic. Cervical discectomy and fusion 5 years ago with worsened symptoms. Had seen pain management without improvement. Was taking Norco, however, this will not be prescribed here. Unable to move head. Difficulty completing housekeeping tasks at home. Uses hot pack and cold pack frequently with minimal improvement. Past Medical History: chronic back pain, hypertension, hypothyroidism, hyperlipidemia, migraine headaches, chronic left hip pain    Medications:   lisinopril-HCTZ 20-12.5 mg, Synthroid 75 mcg daily, atorvastatin 20 mg daily, omeprazole 20mg daily Maxalt 10mg prn    Allergies: Iodide, peanuts, penicillin    Family History: Younger sister with breast CA. Parents : Mother: CAD. Father: Old age. Social:    Education: GED. Employment: Retired. Diet: Described as Good- 3 meals. Eats fruits, vegetables. Exercise: None. Caffeine: Cup coffee in AM and afternoon. Tobacco: smokes 1/4 pack per day- started at 27. Averaged a pack per day. Alcohol: None.      Health Maintenance Due   Topic Date Due    Meningococcal (ACWY) vaccine (1 - Risk start before 7 months 4-dose series) 1947    Hib vaccine (1 of 1 - Risk 1-dose series) 1948    Lipid screen  1957    Meningococcal B vaccine (1 of 4 - Increased Risk Bexsero 2-dose series) 1957    DTaP/Tdap/Td vaccine (1 - Tdap) 1966    Breast cancer screen  1997    Shingles Vaccine (1 of 2) 1997    Colon cancer screen colonoscopy  1997    DEXA (modify frequency per FRAX score)  2002    Pneumococcal 65+ yrs at Risk Vaccine (2 of 2 - PPSV23) 2019    Potassium monitoring  2019    Creatinine monitoring  2019    Annual Wellness Visit (AWV)  2019    Flu vaccine (1) 09/01/2020       Cancer History: None. Family Cancer History: Sister- breast.     Review of Systems    Review of Systems: All bolded are positive, all others are negative. General:  Fever, chills, diaphoresis, fatigue, malaise, night sweats, weight loss  Psychological:  Anxiety, disorientation, hallucinations. ENT:  Epistaxis, headaches, vertigo, visual changes. Cardiovascular:  Chest pain, irregular heartbeats, palpitations, paroxysmal nocturnal dyspnea. Respiratory:  Shortness of breath, coughing, sputum production, hemoptysis, wheezing, orthopnea. Gastrointestinal:  Nausea, vomiting, diarrhea, heartburn, constipation, abdominal pain, hematemesis, hematochezia, melena, acholic stools  Genito-Urinary:  Dysuria, urgency, frequency, hematuria  Musculoskeletal:  Joint pain, joint stiffness, joint swelling, muscle pain  Neurology:  Headache, focal neurological deficits, weakness, numbness, paresthesia  Derm:  Rashes, ulcers, excoriations, bruising  Extremities:  Decreased ROM, peripheral edema, mottling      Objective:  Vitals:    10/30/20 1015   BP: (!) 158/106   Pulse:    Resp:    Temp:    SpO2:    Took BP meds today. States blood pressure typically well controlled. Does not check at home. Physical Exam  Constitutional:       General: She is not in acute distress. Appearance: She is well-developed. She is not diaphoretic. HENT:      Head: Normocephalic and atraumatic. Right Ear: External ear normal.      Left Ear: External ear normal.      Nose: Nose normal.      Mouth/Throat:      Pharynx: No oropharyngeal exudate. Eyes:      General:         Right eye: No discharge. Left eye: No discharge. Conjunctiva/sclera: Conjunctivae normal.      Pupils: Pupils are equal, round, and reactive to light. Neck:      Musculoskeletal: Normal range of motion and neck supple. Cardiovascular:      Rate and Rhythm: Normal rate and regular rhythm.       Heart sounds: Normal heart sounds. No murmur. No friction rub. No gallop. Pulmonary:      Effort: Pulmonary effort is normal. No respiratory distress. Breath sounds: Normal breath sounds. No wheezing or rales. Abdominal:      General: Bowel sounds are normal. There is no distension. Palpations: Abdomen is soft. Tenderness: There is no abdominal tenderness. There is no guarding or rebound. Musculoskeletal:      Comments: Extremely limited range of motion in all planes of cervical spine. Left shoulder range of motion limited in all planes. Lymphadenopathy:      Cervical: No cervical adenopathy. Neurological:      Mental Status: She is alert and oriented to person, place, and time. Comments: Normal sensation in bilateral upper extremities. 5/5 strength upper and lower extremities. Psychiatric:         Mood and Affect: Mood normal.         Behavior: Behavior normal.         Thought Content: Thought content normal.         Judgment: Judgment normal.             Assessment and Plan:  Adrian Sandhu was seen today for new patient. Diagnoses and all orders for this visit:    DDD (degenerative disc disease), cervical  -     DULoxetine (CYMBALTA) 30 MG extended release capsule; Take 1 capsule by mouth daily  -     Nadya Ayala MD, Pain Medicine, [place] [address]. Disc disease cervical spine: Add fusion 5 years ago by Dr. Sam Lutz. Having worse pain overall since. Previously prescribed Norco by last PCP. Attempt Cymbalta 30 mg daily. Referral made to Dr. Tish Maciel with pain management. Follow-up in 4 weeks    Patient may come in sooner if needed for medical concerns. Patient advised to call at any time to cancel or re-scheduleor for any questions/concerns. Please note that >15 minutes was spent face-to-face with the patient gathering history, performing physical exam, discussing findings, counseling the patient, and determining planforward. All questions and concerns addressed and answered.          Madhavi Elizalde HAILY Hernandez DO   10/30/20  10:41 AM

## 2020-11-06 ENCOUNTER — OFFICE VISIT (OUTPATIENT)
Dept: PAIN MANAGEMENT | Age: 75
End: 2020-11-06
Payer: MEDICARE

## 2020-11-06 VITALS
WEIGHT: 114 LBS | BODY MASS INDEX: 24.59 KG/M2 | HEIGHT: 57 IN | SYSTOLIC BLOOD PRESSURE: 150 MMHG | HEART RATE: 109 BPM | DIASTOLIC BLOOD PRESSURE: 88 MMHG | TEMPERATURE: 97.3 F | RESPIRATION RATE: 18 BRPM

## 2020-11-06 PROCEDURE — G8427 DOCREV CUR MEDS BY ELIG CLIN: HCPCS | Performed by: ANESTHESIOLOGY

## 2020-11-06 PROCEDURE — G8420 CALC BMI NORM PARAMETERS: HCPCS | Performed by: ANESTHESIOLOGY

## 2020-11-06 PROCEDURE — 4004F PT TOBACCO SCREEN RCVD TLK: CPT | Performed by: ANESTHESIOLOGY

## 2020-11-06 PROCEDURE — 4040F PNEUMOC VAC/ADMIN/RCVD: CPT | Performed by: ANESTHESIOLOGY

## 2020-11-06 PROCEDURE — 3017F COLORECTAL CA SCREEN DOC REV: CPT | Performed by: ANESTHESIOLOGY

## 2020-11-06 PROCEDURE — G8484 FLU IMMUNIZE NO ADMIN: HCPCS | Performed by: ANESTHESIOLOGY

## 2020-11-06 PROCEDURE — 1090F PRES/ABSN URINE INCON ASSESS: CPT | Performed by: ANESTHESIOLOGY

## 2020-11-06 PROCEDURE — 99204 OFFICE O/P NEW MOD 45 MIN: CPT | Performed by: ANESTHESIOLOGY

## 2020-11-06 PROCEDURE — 1123F ACP DISCUSS/DSCN MKR DOCD: CPT | Performed by: ANESTHESIOLOGY

## 2020-11-06 PROCEDURE — G8400 PT W/DXA NO RESULTS DOC: HCPCS | Performed by: ANESTHESIOLOGY

## 2020-11-06 RX ORDER — METHYLPREDNISOLONE 4 MG/1
TABLET ORAL
Qty: 1 KIT | Refills: 0 | Status: SHIPPED | OUTPATIENT
Start: 2020-11-06 | End: 2020-11-12

## 2020-11-06 NOTE — PROGRESS NOTES
Patient:  RODGER Ferrell 1945  Date of Service:  20      Do you currently have any of the following:    Fever: No  Headache:  No  Cough: Yes  Shortness of breath: No  Exposed to anyone with these symptoms: No       Patient presents with complaints of neck and lower back pain that started 6 months ago and has been getting worse. She states the pain began following Trauma    Pain is constant and is described as aching, throbbing and sharp. She rates the pain as a 10/10 on her worst day , 4/10 on her best day, and a 6/10 on average on the VAS scale. Pain does not radiate to na. She  does not have nna of the na. Alleviating factors include: heat. Aggravating factors include:  bending, lifting. She states that the pain does keep her from sleeping at night. She took her last dose of generic pain OTC today. .     She is not on NSAIDS and  is not on anticoagulation medications to include none and is managed by . Previous treatments: Physical Therapy, Nerve block, Epidural Steroid Injection and medications. . By Dr Richard Newsome Expectations from this treatment: decrease pain    BP (!) 150/88   Pulse 109   Temp 97.3 °F (36.3 °C) (Infrared)   Resp 18   Ht 4' 9\" (1.448 m)   Wt 114 lb (51.7 kg)   BMI 24.67 kg/m²     No LMP recorded.  Patient is postmenopausal.

## 2020-11-06 NOTE — PROGRESS NOTES
[facility]        [address], [address] [facility]      [phone]                  Consult Note      Patient:  RODGER Mckenzie 1945    Date of Service:  20     Requesting Physician:  Tere Bernabe, *    Reason for Consult:      Patient presents with complaints of chronic neck pain    HISTORY OF PRESENT ILLNESS:      Ms. Raudel Fields is a 70 y.o. female [facility] of  Chronic neck pain. Pain for > 10 years. Prior H/o ACDF 10 yrs ago. Has been having chronic neck pain Right side > left. Denies UE radiation. Denies UE tingling / numbness or Bowel or bladder symptoms. Has been treated by Dr. Ethan Cheung with chronic opioids. She has been off opioids for few months. She was getting Hydrocodone 10/325 tid- last prescribed by Dr. Karthik Lopez on 2020. She has been treated in the past by Dr. Sandrea Carrel - had LS interventions SIJ injection / RFA. Pain is constant and is described as aching, stabbing and throbbing. Pain does not radiate to the upper and lower extremities. She  does not have numbness, tingling, weakness of the upper extremities     Patient does not have bladder or bowel dysfunction. Alleviating factors include: rest.  Aggravating factors include: movement, bending, lifting. Pain causes functional limitations/ limits Adl's : Yes    Nursing notes and details of the pain history reviewed. Please see intake notes for details.     Previous treatments:   Physical Therapy : yes, in the past- none recently     Medications: - NSAID's : yes             - Membrane stabilizers : no            - Opioids : yes, Has been on chronic opioids for years by Dr. Ethan Cheung            - Adjuvants or Others : yes- Cymbalta stopped     TENS Unit: no    Surgeries: yes, prior ACDF    Interventional Pain procedures/ nerve blocks: yes, years ago    She has not been on Comments)     migraines    Penicillins      Doesn't recall reaction       Social History     Socioeconomic History    Marital status:      Spouse name: Not on file    Number of children: Not on file    Years of education: Not on file    Highest education level: Not on file   Occupational History    Not on file   Social Needs    Financial resource strain: Not on file    Food insecurity     Worry: Not on file     Inability: Not on file    Transportation needs     Medical: Not on file     Non-medical: Not on file   Tobacco Use    Smoking status: Current Every Day Smoker     Packs/day: 0.50     Years: 50.00     Pack years: 25.00     Types: Cigarettes    Smokeless tobacco: Never Used   Substance and Sexual Activity    Alcohol use: No    Drug use: No    Sexual activity: Not Currently   Lifestyle    Physical activity     Days per week: Not on file     Minutes per session: Not on file    Stress: Not on file   Relationships    Social connections     Talks on phone: Not on file     Gets together: Not on file     Attends [employer] service: Not on file     Active member of club or organization: Not on file     Attends meetings of clubs or organizations: Not on file     Relationship status: Not on file    Intimate partner violence     Fear of current or ex partner: Not on file     Emotionally abused: Not on file     Physically abused: Not on file     Forced sexual activity: Not on file   Other Topics Concern    Not on file   Social History Narrative    Not on file       Family History   Problem Relation Age of Onset    Arthritis Other     Heart Disease Other     High Blood Pressure Other        REVIEW OF SYSTEMS:     Patient specifically denies fever/chills, chest pain, shortness of breath, new bowel or bladder complaints. All other review of systems was negative. Review of Systems - documented in the intake sheet reviewed.     PHYSICAL EXAMINATION:      BP (!) 150/88   Pulse 109   Temp 97.3 °F (36.3 °C) (Infrared)   Resp 18   Ht 4' 9\" (1.448 m)   Wt 114 lb (51.7 kg)   BMI 24.67 kg/m²     General:      General appearance:  Pleasant and well-hydrated, in no distress and A & O x 3  Build:Normal Weight  Function: Rises from seated position easily and Moves about room without difficulty    HEENT:    Head:normocephalic, atraumatic  Pupils:regular, round, equal  Sclera: icterus absent    Lungs:    Breathing:normal breathing pattern     CVS:     RRR    Abdomen:    Shape:non-distended and normal  Tenderness:none  Guarding:none    Cervical spine:    Inspection:normal  Palpation:tenderness paravertebral muscles, tenderness trapezium, left, right and positive. Range of motion:reduced flexion, extension, rotation bilaterally and is painful. Spurling's: negative bilaterally  Facet tenderness + on the right side    Thoracic spine:     Spine inspection:normal   Palpation:No tenderness over the midline and paraspinal area, bilaterally  Range of motion:normal in flexion, extension rotation bilateral and is not painful.     Lumbar spine:    Spine inspection: Normal   Palpation: Tenderness paravertebral muscles No bilaterally  Range of motion: Decreased  Sacroiliac joint tenderness No bilaterally  Piriformis tenderness: negative bilaterally  SLR : negative bilaterally  Trochanteric bursa tenderness: negative bilaterally  CVA tenderness:No     Musculoskeletal:    Trigger points in trapezius: No bilaterally  Trigger points in rhomboids: No bilaterally  Trigger points in Paravertebral: Yes right    Extremities:    Tremors:None bilaterally upper and lower  Edema:none x all 4 extremities    Knee:    ROM : no pain   Joint line tenderness : no    Neurological:    Sensory: Normal to light touch    Motor:   Right  5/5              Left  5/5               Right Bicep 5/5           Left Bicep 5/5              Right Triceps 5/5       Left Triceps 5/5          Right Deltoid 5/5     Left Deltoid 5/5                  Right Quadriceps 5/5          Left Quadriceps 5/5           Right Gastrocnemius 5/5    Left Gastrocnemius 5/5  Right Ant Tibialis 5/5  Left Ant Tibialis 5/5    Reflexes:    Right Brachioradialis reflex 2+  Left Brachioradialis reflex 2+  Right Biceps reflex 2+  Left Biceps reflex 2+  Right Triceps reflex 2+  Left Triceps reflex 2+  Right Quadriceps reflex 2+  Left Quadriceps reflex 2+  Right Achilles reflex 2+  Left Achilles reflex 2+    Gait:normal Yes    Dermatology:    Skin:no rashes or lesions noted    Assessment/Plan:     Diagnosis Orders   1. DDD (degenerative disc disease), cervical     2. Cervical spondylolysis     3. Cervicalgia     4. Cervical postlaminectomy syndrome     H/o chronic opioid use.    70 y.o. female with H/o chronic neck pain. Prior ACDF yrs ago. Neck pain predominantly axial in nature more on the right side. No UE radiation. Clinical features of facet pain / myofacial pain. Has been on chronic opioids by Dr. Elliot Ch has been discontinued few months ago. Plan:  X ray C spine    Physical therapy    TENS unit trial. If helps, will prescribe for long term use. Medrol dose pack. Use instructions and side effects explained. Consider TPI Vs right sided facet MBNB in future. Patient reluctant to do procedures. Consider low dose gabapentin in future. Discussed the issues with chronic opioid therapy. f/u after PT. Note: [facility] noted. Urine screen today: no    Counseling :    Patient encouraged to stay active and to watch/lose weight    Encouraged to continue Regular home exercise program as tolerated - stretching / strengthening. Smoking cessation counseling : yes     Treatment plan discussed with the patient including medication and procedure side effects. Controlled Substances Monitoring:     [place] reviewed was on chronic opioids.     Blaise Vásquez MD    Dear Dr. Arlene Allison,   Thank you for referring Ms. Froy Correia Chace Bowels and allowing us to participate in her care. Please do not hesitate to contact me if you have any questions regarding her care.     Bertha Selby MD    CC:    Kat Hutchinson, [address] [facility],[address] [place] [address]  [address]

## 2020-11-19 ENCOUNTER — VIRTUAL VISIT (OUTPATIENT)
Dept: FAMILY MEDICINE CLINIC | Age: 75
End: 2020-11-19
Payer: MEDICARE

## 2020-11-19 PROCEDURE — G2012 BRIEF CHECK IN BY MD/QHP: HCPCS | Performed by: FAMILY MEDICINE

## 2020-11-19 RX ORDER — IBUPROFEN 400 MG/1
800 TABLET ORAL EVERY 6 HOURS PRN
Qty: 180 TABLET | Refills: 0 | Status: SHIPPED
Start: 2020-11-19 | End: 2021-02-24 | Stop reason: SDUPTHER

## 2020-11-19 NOTE — PROGRESS NOTES
Carly Girard is a 70 y.o. female evaluated via telephone on 11/19/2020. Consent:  She and/or health care decision maker is aware that that she may receive a bill for this telephone service, depending on her insurance coverage, and has provided verbal consent to proceed: Yes      Documentation:  I communicated with the patient and/or health care decision maker about neck pain. Details of this discussion including any medical advice provided:     Neck pain: Continues. Causing headaches. Following with Dr. Dorene Martinez. Currently taking OTC medications. Has not started PT prescribed by pain management. Pt states that she cannot afford tests. She also states that she does not want injections in her neck. States she is taking aspirin 325mg \"6 or more\" every day. States she has had some nausea and diarrhea. Stools are not black or bloody. Request referral to Dr. Gogo Clements for second opinion. Prescribed ibuprofen 400mg 2 tablets to be taken tid prn for pain. I affirm this is a Patient Initiated Episode with a Patient who has not had a related appointment within my department in the past 7 days or scheduled within the next 24 hours.     Patient identification was verified at the start of the visit: Yes    Total Time: minutes: 11-20 minutes    Note: not billable if this call serves to triage the patient into an appointment for the relevant concern      Beau Epley, DO  11/19/20  3:47 PM

## 2020-11-23 ENCOUNTER — TELEPHONE (OUTPATIENT)
Dept: FAMILY MEDICINE CLINIC | Age: 75
End: 2020-11-23

## 2020-11-23 NOTE — TELEPHONE ENCOUNTER
Patient called stating she does not want to make appt w/Dr. Hunter Mejias since he wants to do testing and give patient an injection in her neck for pain. Patient stated she found a doctor in [place] who she wants to make appt with. Informed patient to check w/that ofc to see if they accept her insurance and to find out if further testing/injections would be recommended, since patient stated she does not want to have this done. Advised patient to let ofc know once she looks into.

## 2020-11-25 ENCOUNTER — OFFICE VISIT (OUTPATIENT)
Dept: FAMILY MEDICINE CLINIC | Age: 75
End: 2020-11-25
Payer: MEDICARE

## 2020-11-25 VITALS
DIASTOLIC BLOOD PRESSURE: 90 MMHG | BODY MASS INDEX: 23.99 KG/M2 | SYSTOLIC BLOOD PRESSURE: 142 MMHG | TEMPERATURE: 97.3 F | HEIGHT: 58 IN | HEART RATE: 100 BPM | OXYGEN SATURATION: 94 % | RESPIRATION RATE: 22 BRPM | WEIGHT: 114.3 LBS

## 2020-11-25 PROCEDURE — 3017F COLORECTAL CA SCREEN DOC REV: CPT | Performed by: FAMILY MEDICINE

## 2020-11-25 PROCEDURE — 4004F PT TOBACCO SCREEN RCVD TLK: CPT | Performed by: FAMILY MEDICINE

## 2020-11-25 PROCEDURE — 1123F ACP DISCUSS/DSCN MKR DOCD: CPT | Performed by: FAMILY MEDICINE

## 2020-11-25 PROCEDURE — G8400 PT W/DXA NO RESULTS DOC: HCPCS | Performed by: FAMILY MEDICINE

## 2020-11-25 PROCEDURE — 4040F PNEUMOC VAC/ADMIN/RCVD: CPT | Performed by: FAMILY MEDICINE

## 2020-11-25 PROCEDURE — G8420 CALC BMI NORM PARAMETERS: HCPCS | Performed by: FAMILY MEDICINE

## 2020-11-25 PROCEDURE — G8484 FLU IMMUNIZE NO ADMIN: HCPCS | Performed by: FAMILY MEDICINE

## 2020-11-25 PROCEDURE — 99212 OFFICE O/P EST SF 10 MIN: CPT | Performed by: FAMILY MEDICINE

## 2020-11-25 PROCEDURE — 1090F PRES/ABSN URINE INCON ASSESS: CPT | Performed by: FAMILY MEDICINE

## 2020-11-25 PROCEDURE — G8427 DOCREV CUR MEDS BY ELIG CLIN: HCPCS | Performed by: FAMILY MEDICINE

## 2020-11-25 RX ORDER — DULOXETIN HYDROCHLORIDE 20 MG/1
20 CAPSULE, DELAYED RELEASE ORAL DAILY
Qty: 30 CAPSULE | Refills: 1 | Status: SHIPPED | OUTPATIENT
Start: 2020-11-25 | End: 2021-03-25

## 2020-11-25 RX ORDER — DULOXETIN HYDROCHLORIDE 20 MG/1
20 CAPSULE, DELAYED RELEASE ORAL DAILY
Qty: 30 CAPSULE | Refills: 0 | Status: SHIPPED | OUTPATIENT
Start: 2020-11-25 | End: 2020-11-25 | Stop reason: SDUPTHER

## 2020-11-25 NOTE — PATIENT INSTRUCTIONS
[phone] Dr. Ronny Guevara with Pain Management. Referral was sent last week. Call to schedule appointment.

## 2020-11-25 NOTE — PROGRESS NOTES
Subjective:  70 y.o. female who presents to the office today with chief complaint:  Chief Complaint   Patient presents with    Neck Pain     Cervical spine degenerative disc disease: Patient states that she continues to have significant pain in her neck. She has not yet scheduled appointment with Dr. Robbie Ordoñez with pain management. She was strongly advised to do this. Patient states that she does not remember taking the Cymbalta that was previously prescribed. She is open to reattempting this. Health Maintenance Due   Topic Date Due    Meningococcal (ACWY) vaccine (1 - Risk start before 7 months 4-dose series) 1945    Hib vaccine (1 of 1 - Risk 1-dose series) 04/03/1948    Lipid screen  01/03/1957    Meningococcal B vaccine (1 of 4 - Increased Risk Bexsero 2-dose series) 01/03/1957    DTaP/Tdap/Td vaccine (1 - Tdap) 01/03/1966    Breast cancer screen  01/03/1997    Shingles Vaccine (1 of 2) 01/03/1997    Colon cancer screen colonoscopy  01/03/1997    DEXA (modify frequency per FRAX score)  01/03/2002    Pneumococcal 65+ yrs at Risk Vaccine (2 of 2 - PPSV23) 02/28/2019    Potassium monitoring  04/03/2019    Creatinine monitoring  04/03/2019    Annual Wellness Visit (AWV)  06/21/2019    Flu vaccine (1) 09/01/2020       Cancer History: None. Family Cancer History: Sister- breast.     Review of Systems    Review of Systems: All bolded are positive, all others are negative. General:  Fever, chills, diaphoresis, fatigue, malaise, night sweats, weight loss  Psychological:  Anxiety, disorientation, hallucinations. ENT:  Epistaxis, headaches, vertigo, visual changes. Cardiovascular:  Chest pain, irregular heartbeats, palpitations, paroxysmal nocturnal dyspnea. Respiratory:  Shortness of breath, coughing, sputum production, hemoptysis, wheezing, orthopnea.   Gastrointestinal:  Nausea, vomiting, diarrhea, heartburn, constipation, abdominal pain, hematemesis, hematochezia, melena, acholic Affect: Mood normal.         Behavior: Behavior normal.         Thought Content: Thought content normal.         Judgment: Judgment normal.             Assessment and Plan:  There are no diagnoses linked to this encounter. 1. Degenerative disc disease cervical spine: Again, patient was urged to set up an appointment with Dr. Blessing Carballo with pain management. She was referred last week, however she has not received a call from the practice at this time. Discussion was held with the patient that laws on opioid prescription in [place] will be no different than they are in [place]. Reattempt Cymbalta 20 mg daily. Discussion was held about starting amitriptyline, however, patient is concerned about drowsiness from this medication. Follow-up in early February 2021    Patient may come in sooner if needed for medical concerns. Patient advised to call at any time to cancel or re-scheduleor for any questions/concerns. Please note that >15 minutes was spent face-to-face with the patient gathering history, performing physical exam, discussing findings, counseling the patient, and determining planforward. All questions and concerns addressed and answered.          Miladys Hernandez,    11/25/20    8:11 AM

## 2021-02-22 RX ORDER — RIZATRIPTAN BENZOATE 10 MG/1
10 TABLET ORAL
Qty: 90 TABLET | OUTPATIENT
Start: 2021-02-22 | End: 2021-02-22

## 2021-02-24 ENCOUNTER — OFFICE VISIT (OUTPATIENT)
Dept: FAMILY MEDICINE CLINIC | Age: 76
End: 2021-02-24
Payer: MEDICARE

## 2021-02-24 VITALS
RESPIRATION RATE: 16 BRPM | DIASTOLIC BLOOD PRESSURE: 84 MMHG | HEIGHT: 57 IN | WEIGHT: 111.1 LBS | SYSTOLIC BLOOD PRESSURE: 126 MMHG | BODY MASS INDEX: 23.97 KG/M2 | HEART RATE: 104 BPM | OXYGEN SATURATION: 93 % | TEMPERATURE: 97.5 F

## 2021-02-24 DIAGNOSIS — I10 ESSENTIAL HYPERTENSION: ICD-10-CM

## 2021-02-24 DIAGNOSIS — E03.9 ACQUIRED HYPOTHYROIDISM: ICD-10-CM

## 2021-02-24 DIAGNOSIS — K21.9 GERD WITHOUT ESOPHAGITIS: ICD-10-CM

## 2021-02-24 DIAGNOSIS — G43.009 MIGRAINE WITHOUT AURA AND WITHOUT STATUS MIGRAINOSUS, NOT INTRACTABLE: Primary | ICD-10-CM

## 2021-02-24 PROCEDURE — 1123F ACP DISCUSS/DSCN MKR DOCD: CPT | Performed by: FAMILY MEDICINE

## 2021-02-24 PROCEDURE — 3017F COLORECTAL CA SCREEN DOC REV: CPT | Performed by: FAMILY MEDICINE

## 2021-02-24 PROCEDURE — G8420 CALC BMI NORM PARAMETERS: HCPCS | Performed by: FAMILY MEDICINE

## 2021-02-24 PROCEDURE — G8427 DOCREV CUR MEDS BY ELIG CLIN: HCPCS | Performed by: FAMILY MEDICINE

## 2021-02-24 PROCEDURE — 1090F PRES/ABSN URINE INCON ASSESS: CPT | Performed by: FAMILY MEDICINE

## 2021-02-24 PROCEDURE — 4040F PNEUMOC VAC/ADMIN/RCVD: CPT | Performed by: FAMILY MEDICINE

## 2021-02-24 PROCEDURE — G8484 FLU IMMUNIZE NO ADMIN: HCPCS | Performed by: FAMILY MEDICINE

## 2021-02-24 PROCEDURE — 4004F PT TOBACCO SCREEN RCVD TLK: CPT | Performed by: FAMILY MEDICINE

## 2021-02-24 PROCEDURE — 99213 OFFICE O/P EST LOW 20 MIN: CPT | Performed by: FAMILY MEDICINE

## 2021-02-24 PROCEDURE — G8400 PT W/DXA NO RESULTS DOC: HCPCS | Performed by: FAMILY MEDICINE

## 2021-02-24 RX ORDER — OMEPRAZOLE 20 MG/1
20 CAPSULE, DELAYED RELEASE ORAL DAILY
Qty: 90 CAPSULE | Refills: 0 | Status: SHIPPED
Start: 2021-02-24 | End: 2021-03-25 | Stop reason: SDUPTHER

## 2021-02-24 RX ORDER — RIZATRIPTAN BENZOATE 10 MG/1
10 TABLET ORAL
Qty: 60 TABLET | Refills: 0 | Status: SHIPPED
Start: 2021-02-24 | End: 2021-03-25 | Stop reason: SDUPTHER

## 2021-02-24 RX ORDER — LISINOPRIL AND HYDROCHLOROTHIAZIDE 20; 12.5 MG/1; MG/1
1 TABLET ORAL DAILY
Qty: 90 TABLET | Refills: 0 | Status: SHIPPED
Start: 2021-02-24 | End: 2021-03-25 | Stop reason: SDUPTHER

## 2021-02-24 RX ORDER — IBUPROFEN 400 MG/1
800 TABLET ORAL EVERY 6 HOURS PRN
Qty: 180 TABLET | Refills: 2 | Status: SHIPPED
Start: 2021-02-24 | End: 2021-03-25 | Stop reason: SDUPTHER

## 2021-02-24 RX ORDER — LEVOTHYROXINE SODIUM 0.07 MG/1
75 TABLET ORAL DAILY
Qty: 90 TABLET | Refills: 0 | Status: SHIPPED
Start: 2021-02-24 | End: 2021-03-25 | Stop reason: SDUPTHER

## 2021-02-24 RX ORDER — ATORVASTATIN CALCIUM 20 MG/1
20 TABLET, FILM COATED ORAL DAILY
Qty: 90 TABLET | Refills: 0 | Status: SHIPPED
Start: 2021-02-24 | End: 2021-03-25 | Stop reason: SDUPTHER

## 2021-02-24 ASSESSMENT — PATIENT HEALTH QUESTIONNAIRE - PHQ9
SUM OF ALL RESPONSES TO PHQ QUESTIONS 1-9: 2
2. FEELING DOWN, DEPRESSED OR HOPELESS: 1
SUM OF ALL RESPONSES TO PHQ QUESTIONS 1-9: 2
1. LITTLE INTEREST OR PLEASURE IN DOING THINGS: 1

## 2021-02-24 NOTE — PROGRESS NOTES
Subjective:  78 y.o. female who presents to the office today with chief complaint:  Chief Complaint   Patient presents with    Headache     Migraine headaches: Ongoing. States that she does get some relief from Maxalt. Has been out of these for several weeks. Because of this, patient is but the majority of her day laying in bed due to headache pain. She continues to have significant neck pain as well. She was referred to Dr. Tonya Patel at last visit, however, she has not seen him. Discussion held today about following up with a neurologist due to her pain. She agreed to this. She also asked for a cervical spine x-ray. This was previously prescribed by Dr. Zoila Page with pain management, but she did not have it completed. She agrees to have it completed. Health Maintenance Due   Topic Date Due    Meningococcal (ACWY) vaccine (1 - Risk start before 7 months 4-dose series) 1945    Hib vaccine (1 of 1 - Risk 1-dose series) [birth year]03/1948    Lipid screen  01/03/1957    Meningococcal B vaccine (1 of 4 - Increased Risk Bexsero 2-dose series) 01/03/1957    COVID-19 Vaccine (1 of 2) 01/03/1963    DTaP/Tdap/Td vaccine (1 - Tdap) 01/03/1966    Breast cancer screen  01/03/1997    Shingles Vaccine (1 of 2) 01/03/1997    Colon cancer screen colonoscopy  01/03/1997    DEXA (modify frequency per FRAX score)  01/03/2002    Pneumococcal 65+ yrs at Risk Vaccine (2 of 2 - PPSV23) 02/28/2019    Potassium monitoring  04/03/2019    Creatinine monitoring  04/03/2019    Annual Wellness Visit (AWV)  06/21/2019    Flu vaccine (1) 09/01/2020       Cancer History: None. Family Cancer History: Sister- breast.     Review of Systems    Review of Systems: All bolded are positive, all others are negative. General:  Fever, chills, diaphoresis, fatigue, malaise, night sweats, weight loss  Psychological:  Anxiety, disorientation, hallucinations. ENT:  Epistaxis, headaches, vertigo, visual changes.   Cardiovascular: Chest pain, irregular heartbeats, palpitations, paroxysmal nocturnal dyspnea. Respiratory:  Shortness of breath, coughing, sputum production, hemoptysis, wheezing, orthopnea. Gastrointestinal:  Nausea, vomiting, diarrhea, heartburn, constipation, abdominal pain, hematemesis, hematochezia, melena, acholic stools  Genito-Urinary:  Dysuria, urgency, frequency, hematuria  Musculoskeletal:  Joint pain-neck, joint stiffness, joint swelling, muscle pain  Neurology:  Headache, focal neurological deficits, weakness, numbness, paresthesia  Derm:  Rashes, ulcers, excoriations, bruising  Extremities:  Decreased ROM, peripheral edema, mottling      Objective:  Vitals:    02/24/21 0836   BP: 126/84   Pulse: 104   Resp: 16   Temp: 97.5 °F (36.4 °C)   SpO2: 93%   Took BP meds today. Blood pressure elevated due to pain, recent cigarette use    Physical Exam  Constitutional:       General: She is not in acute distress. Appearance: She is well-developed. She is not diaphoretic. HENT:      Head: Normocephalic and atraumatic. Right Ear: External ear normal.      Left Ear: External ear normal.      Nose: Nose normal.      Mouth/Throat:      Pharynx: No oropharyngeal exudate. Eyes:      General:         Right eye: No discharge. Left eye: No discharge. Conjunctiva/sclera: Conjunctivae normal.      Pupils: Pupils are equal, round, and reactive to light. Neck:      Musculoskeletal: Normal range of motion and neck supple. Cardiovascular:      Rate and Rhythm: Normal rate and regular rhythm. Heart sounds: Normal heart sounds. No murmur. No friction rub. No gallop. Pulmonary:      Effort: Pulmonary effort is normal. No respiratory distress. Breath sounds: Normal breath sounds. No wheezing or rales. Comments: Patient not in respiratory distress. There is no increased work of breathing. There is fair air movement all lung fields.   Abdominal:      General: Bowel sounds are normal. There is no distension. Palpations: Abdomen is soft. Tenderness: There is no abdominal tenderness. There is no guarding or rebound. Musculoskeletal:      Comments: Extremely limited range of motion in all planes of cervical spine. Very tight and tender bilateral upper traps. Lymphadenopathy:      Cervical: No cervical adenopathy. Neurological:      Mental Status: She is alert and oriented to person, place, and time. Psychiatric:         Mood and Affect: Mood normal.         Behavior: Behavior normal.         Thought Content: Thought content normal.         Judgment: Judgment normal.             Assessment and Plan:  Lux Escamilla was seen today for headache. Diagnoses and all orders for this visit:    Migraine without aura and without status migrainosus, not intractable  -     ibuprofen (ADVIL;MOTRIN) 400 MG tablet; Take 2 tablets by mouth every 6 hours as needed for Pain  -     rizatriptan (MAXALT) 10 MG tablet; Take 1 tablet by mouth once as needed for Migraine May repeat in 2 hours if needed  -     External Referral To Neurology    Essential hypertension  -     atorvastatin (LIPITOR) 20 MG tablet; Take 1 tablet by mouth daily  -     lisinopril-hydroCHLOROthiazide (PRINZIDE;ZESTORETIC) 20-12.5 MG per tablet; Take 1 tablet by mouth daily    GERD without esophagitis  -     omeprazole (PRILOSEC) 20 MG delayed release capsule; Take 1 capsule by mouth daily    Acquired hypothyroidism  -     levothyroxine (SYNTHROID) 75 MCG tablet; Take 1 tablet by mouth Daily        1. Migraines: Referral sent to Dr. Bradley Ramírez with neurology due to uncontrolled migraines. May be stemming from her neck as well. Has not followed up yet with pain management. Continue Maxalt and Cymbalta. Patient agrees to have the x-ray completed today. 2: Labs: Patient agrees to have labs completed prior to next visit. Follow-up in 1 month    Patient may come in sooner if needed for medical concerns.      Patient advised to call at any time to

## 2021-03-17 DIAGNOSIS — I10 ESSENTIAL HYPERTENSION: ICD-10-CM

## 2021-03-17 DIAGNOSIS — E03.9 ACQUIRED HYPOTHYROIDISM: ICD-10-CM

## 2021-03-17 LAB
ALBUMIN SERPL-MCNC: 4.6 G/DL (ref 3.5–5.2)
ALP BLD-CCNC: 146 U/L (ref 35–104)
ALT SERPL-CCNC: 20 U/L (ref 0–32)
ANION GAP SERPL CALCULATED.3IONS-SCNC: 11 MMOL/L (ref 7–16)
AST SERPL-CCNC: 27 U/L (ref 0–31)
BILIRUB SERPL-MCNC: 0.5 MG/DL (ref 0–1.2)
BUN BLDV-MCNC: 14 MG/DL (ref 8–23)
CALCIUM SERPL-MCNC: 10.4 MG/DL (ref 8.6–10.2)
CHLORIDE BLD-SCNC: 97 MMOL/L (ref 98–107)
CHOLESTEROL, TOTAL: 187 MG/DL (ref 0–199)
CO2: 30 MMOL/L (ref 22–29)
CREAT SERPL-MCNC: 0.6 MG/DL (ref 0.5–1)
GFR AFRICAN AMERICAN: >60
GFR NON-AFRICAN AMERICAN: >60 ML/MIN/1.73
GLUCOSE BLD-MCNC: 118 MG/DL (ref 74–99)
HCT VFR BLD CALC: 46.3 % (ref 34–48)
HDLC SERPL-MCNC: 63 MG/DL
HEMOGLOBIN: 15.1 G/DL (ref 11.5–15.5)
LDL CHOLESTEROL CALCULATED: 100 MG/DL (ref 0–99)
MCH RBC QN AUTO: 31.5 PG (ref 26–35)
MCHC RBC AUTO-ENTMCNC: 32.6 % (ref 32–34.5)
MCV RBC AUTO: 96.7 FL (ref 80–99.9)
PDW BLD-RTO: 14 FL (ref 11.5–15)
PLATELET # BLD: 430 E9/L (ref 130–450)
PMV BLD AUTO: 10.9 FL (ref 7–12)
POTASSIUM SERPL-SCNC: 3.8 MMOL/L (ref 3.5–5)
RBC # BLD: 4.79 E12/L (ref 3.5–5.5)
SODIUM BLD-SCNC: 138 MMOL/L (ref 132–146)
TOTAL PROTEIN: 8 G/DL (ref 6.4–8.3)
TRIGL SERPL-MCNC: 121 MG/DL (ref 0–149)
TSH SERPL DL<=0.05 MIU/L-ACNC: 2.03 UIU/ML (ref 0.27–4.2)
VLDLC SERPL CALC-MCNC: 24 MG/DL
WBC # BLD: 12.4 E9/L (ref 4.5–11.5)

## 2021-03-25 ENCOUNTER — OFFICE VISIT (OUTPATIENT)
Dept: FAMILY MEDICINE CLINIC | Age: 76
End: 2021-03-25
Payer: MEDICARE

## 2021-03-25 VITALS
BODY MASS INDEX: 23.51 KG/M2 | WEIGHT: 109 LBS | HEART RATE: 88 BPM | HEIGHT: 57 IN | TEMPERATURE: 97.5 F | OXYGEN SATURATION: 93 % | RESPIRATION RATE: 16 BRPM | SYSTOLIC BLOOD PRESSURE: 160 MMHG | DIASTOLIC BLOOD PRESSURE: 106 MMHG

## 2021-03-25 DIAGNOSIS — K21.9 GERD WITHOUT ESOPHAGITIS: ICD-10-CM

## 2021-03-25 DIAGNOSIS — I10 ESSENTIAL HYPERTENSION: ICD-10-CM

## 2021-03-25 DIAGNOSIS — M50.30 DDD (DEGENERATIVE DISC DISEASE), CERVICAL: Chronic | ICD-10-CM

## 2021-03-25 DIAGNOSIS — E03.9 ACQUIRED HYPOTHYROIDISM: ICD-10-CM

## 2021-03-25 DIAGNOSIS — G43.009 MIGRAINE WITHOUT AURA AND WITHOUT STATUS MIGRAINOSUS, NOT INTRACTABLE: ICD-10-CM

## 2021-03-25 PROCEDURE — G8400 PT W/DXA NO RESULTS DOC: HCPCS | Performed by: FAMILY MEDICINE

## 2021-03-25 PROCEDURE — 99213 OFFICE O/P EST LOW 20 MIN: CPT | Performed by: FAMILY MEDICINE

## 2021-03-25 PROCEDURE — G8427 DOCREV CUR MEDS BY ELIG CLIN: HCPCS | Performed by: FAMILY MEDICINE

## 2021-03-25 PROCEDURE — G8484 FLU IMMUNIZE NO ADMIN: HCPCS | Performed by: FAMILY MEDICINE

## 2021-03-25 PROCEDURE — 3017F COLORECTAL CA SCREEN DOC REV: CPT | Performed by: FAMILY MEDICINE

## 2021-03-25 PROCEDURE — 4004F PT TOBACCO SCREEN RCVD TLK: CPT | Performed by: FAMILY MEDICINE

## 2021-03-25 PROCEDURE — 4040F PNEUMOC VAC/ADMIN/RCVD: CPT | Performed by: FAMILY MEDICINE

## 2021-03-25 PROCEDURE — 1090F PRES/ABSN URINE INCON ASSESS: CPT | Performed by: FAMILY MEDICINE

## 2021-03-25 PROCEDURE — G8420 CALC BMI NORM PARAMETERS: HCPCS | Performed by: FAMILY MEDICINE

## 2021-03-25 PROCEDURE — 1123F ACP DISCUSS/DSCN MKR DOCD: CPT | Performed by: FAMILY MEDICINE

## 2021-03-25 RX ORDER — DULOXETIN HYDROCHLORIDE 20 MG/1
20 CAPSULE, DELAYED RELEASE ORAL DAILY
Qty: 90 CAPSULE | Refills: 0 | Status: CANCELLED | OUTPATIENT
Start: 2021-03-25

## 2021-03-25 RX ORDER — IBUPROFEN 400 MG/1
800 TABLET ORAL EVERY 6 HOURS PRN
Qty: 180 TABLET | Refills: 2 | Status: SHIPPED
Start: 2021-03-25 | End: 2021-05-26 | Stop reason: SDUPTHER

## 2021-03-25 RX ORDER — OMEPRAZOLE 20 MG/1
20 CAPSULE, DELAYED RELEASE ORAL DAILY
Qty: 90 CAPSULE | Refills: 0 | Status: SHIPPED
Start: 2021-03-25 | End: 2021-05-26 | Stop reason: SDUPTHER

## 2021-03-25 RX ORDER — LISINOPRIL AND HYDROCHLOROTHIAZIDE 20; 12.5 MG/1; MG/1
1 TABLET ORAL DAILY
Qty: 90 TABLET | Refills: 0 | Status: SHIPPED
Start: 2021-03-25 | End: 2021-03-25 | Stop reason: DRUGHIGH

## 2021-03-25 RX ORDER — LISINOPRIL AND HYDROCHLOROTHIAZIDE 25; 20 MG/1; MG/1
1 TABLET ORAL DAILY
Qty: 90 TABLET | Refills: 1 | Status: SHIPPED
Start: 2021-03-25 | End: 2021-05-26 | Stop reason: SDUPTHER

## 2021-03-25 RX ORDER — ATORVASTATIN CALCIUM 20 MG/1
20 TABLET, FILM COATED ORAL DAILY
Qty: 90 TABLET | Refills: 0 | Status: SHIPPED
Start: 2021-03-25 | End: 2021-05-26 | Stop reason: SDUPTHER

## 2021-03-25 RX ORDER — RIZATRIPTAN BENZOATE 10 MG/1
10 TABLET ORAL
Qty: 60 TABLET | Refills: 0 | Status: SHIPPED
Start: 2021-03-25 | End: 2021-05-26 | Stop reason: SDUPTHER

## 2021-03-25 RX ORDER — LEVOTHYROXINE SODIUM 0.07 MG/1
75 TABLET ORAL DAILY
Qty: 90 TABLET | Refills: 0 | Status: SHIPPED
Start: 2021-03-25 | End: 2021-05-26 | Stop reason: SDUPTHER

## 2021-03-25 NOTE — PROGRESS NOTES
Subjective:  78 y.o. female who presents to the office today with chief complaint:  Chief Complaint   Patient presents with    1 Month Follow-Up    Hypertension     Migraine headaches: Ongoing. She does have a neurology appointment in the coming weeks with . Uses Maxalt several times per week with some improvement. She states that she also uses her 's over-the-counter \"pain medication\" which also provides some relief. Discussion was again held about referral to pain management. Patient refused this at this time. Patient continues to smoke roughly 1/4 pack of cigarettes per day. Hypertension: Patient currently taking lisinopril-HCTZ 20-12.5 mg.  Blood pressure has been elevated on multiple recent visits. Patient agrees to an increased dose. Health Maintenance Due   Topic Date Due    Meningococcal (ACWY) vaccine (1 - Risk start before 7 months 4-dose series) Never done    Hib vaccine (1 of 1 - Risk 1-dose series) Never done    Meningococcal B vaccine (1 of 4 - Increased Risk Bexsero 2-dose series) Never done    COVID-19 Vaccine (1) Never done    DTaP/Tdap/Td vaccine (1 - Tdap) Never done    Breast cancer screen  Never done    Shingles Vaccine (1 of 2) Never done    Colon cancer screen colonoscopy  Never done    DEXA (modify frequency per FRAX score)  Never done    Pneumococcal 65+ yrs at Risk Vaccine (2 of 2 - PPSV23) 02/28/2019    Annual Wellness Visit (AWV)  Never done    Flu vaccine (1) 09/01/2020     Refuses mammogram    Cancer History: None. Family Cancer History: Sister- breast.     Review of Systems    Review of Systems: All bolded are positive, all others are negative. General:  Fever, chills, diaphoresis, fatigue, malaise, night sweats, weight loss  Psychological:  Anxiety, disorientation, hallucinations. ENT:  Epistaxis, headaches, vertigo, visual changes.   Cardiovascular:  Chest pain, irregular heartbeats, palpitations, paroxysmal nocturnal dyspnea. Respiratory:  Shortness of breath, coughing, sputum production, hemoptysis, wheezing, orthopnea. Gastrointestinal:  Nausea, vomiting, diarrhea, heartburn, constipation, abdominal pain, hematemesis, hematochezia, melena, acholic stools  Genito-Urinary:  Dysuria, urgency, frequency, hematuria  Musculoskeletal:  Joint pain-neck, joint stiffness, joint swelling, muscle pain  Neurology:  Headache, focal neurological deficits, weakness, numbness, paresthesia  Derm:  Rashes, ulcers, excoriations, bruising  Extremities:  Decreased ROM, peripheral edema, mottling      Objective:  Vitals:    03/25/21 0855   BP: (!) 160/106   Pulse:    Resp:    Temp:    SpO2:    Took BP meds today. Blood pressure elevated due to pain, recent cigarette use    Physical Exam  Constitutional:       General: She is not in acute distress. Appearance: She is well-developed. She is not diaphoretic. HENT:      Head: Normocephalic and atraumatic. Right Ear: External ear normal.      Left Ear: External ear normal.      Nose: Nose normal.      Mouth/Throat:      Pharynx: No oropharyngeal exudate. Eyes:      General:         Right eye: No discharge. Left eye: No discharge. Conjunctiva/sclera: Conjunctivae normal.      Pupils: Pupils are equal, round, and reactive to light. Neck:      Musculoskeletal: Normal range of motion and neck supple. Cardiovascular:      Rate and Rhythm: Normal rate and regular rhythm. Heart sounds: Normal heart sounds. No murmur. No friction rub. No gallop. Pulmonary:      Effort: Pulmonary effort is normal. No respiratory distress. Breath sounds: Normal breath sounds. No wheezing or rales. Comments: Patient not in respiratory distress. There is no increased work of breathing. There is fair air movement all lung fields. Abdominal:      General: Bowel sounds are normal. There is no distension. Palpations: Abdomen is soft. Tenderness:  There is no abdominal tenderness. There is no guarding or rebound. Musculoskeletal:      Comments: Extremely limited range of motion in all planes of cervical spine. Very tight and tender bilateral upper traps. Lymphadenopathy:      Cervical: No cervical adenopathy. Neurological:      Mental Status: She is alert and oriented to person, place, and time. Psychiatric:         Mood and Affect: Mood normal.         Behavior: Behavior normal.         Thought Content: Thought content normal.         Judgment: Judgment normal.             Assessment and Plan:  Lux Escamilla was seen today for 1 month follow-up and hypertension. Diagnoses and all orders for this visit:    Essential hypertension  -     atorvastatin (LIPITOR) 20 MG tablet; Take 1 tablet by mouth daily  -     Discontinue: lisinopril-hydroCHLOROthiazide (PRINZIDE;ZESTORETIC) 20-12.5 MG per tablet; Take 1 tablet by mouth daily    DDD (degenerative disc disease), cervical    Migraine without aura and without status migrainosus, not intractable  -     ibuprofen (ADVIL;MOTRIN) 400 MG tablet; Take 2 tablets by mouth every 6 hours as needed for Pain  -     rizatriptan (MAXALT) 10 MG tablet; Take 1 tablet by mouth once as needed for Migraine May repeat in 2 hours if needed    Acquired hypothyroidism  -     levothyroxine (SYNTHROID) 75 MCG tablet; Take 1 tablet by mouth Daily    GERD without esophagitis  -     omeprazole (PRILOSEC) 20 MG delayed release capsule; Take 1 capsule by mouth daily    Other orders  -     lisinopril-hydroCHLOROthiazide (PRINZIDE;ZESTORETIC) 20-25 MG per tablet; Take 1 tablet by mouth daily        1. Migraines: Patient to see Dr. Bradley Ramírez with neurology in the coming weeks. She is to continue Maxalt as needed and NSAIDs as needed. 2. Hypertension: Lisinopril-HCTZ 20-25 milligrams daily. Follow-up in 3 months    Patient may come in sooner if needed for medical concerns.      Patient advised to call at any time to cancel or re-scheduleor for any questions/concerns. All questions and concerns addressed and answered.          Ace Hernandez DO   3/25/21    1:24 PM

## 2021-05-25 DIAGNOSIS — G43.009 MIGRAINE WITHOUT AURA AND WITHOUT STATUS MIGRAINOSUS, NOT INTRACTABLE: ICD-10-CM

## 2021-05-25 DIAGNOSIS — E03.9 ACQUIRED HYPOTHYROIDISM: ICD-10-CM

## 2021-05-25 DIAGNOSIS — K21.9 GERD WITHOUT ESOPHAGITIS: ICD-10-CM

## 2021-05-25 DIAGNOSIS — I10 ESSENTIAL HYPERTENSION: ICD-10-CM

## 2021-05-25 NOTE — TELEPHONE ENCOUNTER
Last Appointment   3/25/2021  Next Appointment  6/29/2021 with Dr Josie Malhotra      Patient might have enough medication until sees Dr Josie Malhotra?

## 2021-05-26 RX ORDER — LISINOPRIL AND HYDROCHLOROTHIAZIDE 25; 20 MG/1; MG/1
1 TABLET ORAL DAILY
Qty: 90 TABLET | Refills: 1 | Status: SHIPPED
Start: 2021-05-26 | End: 2021-07-29 | Stop reason: SDUPTHER

## 2021-05-26 RX ORDER — RIZATRIPTAN BENZOATE 10 MG/1
10 TABLET ORAL
Qty: 60 TABLET | Refills: 0 | Status: SHIPPED
Start: 2021-05-26 | End: 2021-07-29 | Stop reason: SDUPTHER

## 2021-05-26 RX ORDER — IBUPROFEN 400 MG/1
800 TABLET ORAL EVERY 6 HOURS PRN
Qty: 180 TABLET | Refills: 2 | Status: SHIPPED
Start: 2021-05-26 | End: 2021-07-29 | Stop reason: SDUPTHER

## 2021-05-26 RX ORDER — LEVOTHYROXINE SODIUM 0.07 MG/1
75 TABLET ORAL DAILY
Qty: 90 TABLET | Refills: 0 | Status: SHIPPED
Start: 2021-05-26 | End: 2021-06-29 | Stop reason: SDUPTHER

## 2021-05-26 RX ORDER — OMEPRAZOLE 20 MG/1
20 CAPSULE, DELAYED RELEASE ORAL DAILY
Qty: 90 CAPSULE | Refills: 0 | Status: SHIPPED
Start: 2021-05-26 | End: 2021-06-29 | Stop reason: SDUPTHER

## 2021-05-26 RX ORDER — ATORVASTATIN CALCIUM 20 MG/1
20 TABLET, FILM COATED ORAL DAILY
Qty: 90 TABLET | Refills: 0 | Status: SHIPPED
Start: 2021-05-26 | End: 2021-06-29 | Stop reason: SDUPTHER

## 2021-06-29 ENCOUNTER — OFFICE VISIT (OUTPATIENT)
Dept: FAMILY MEDICINE CLINIC | Age: 76
End: 2021-06-29
Payer: MEDICARE

## 2021-06-29 VITALS
BODY MASS INDEX: 23.15 KG/M2 | OXYGEN SATURATION: 92 % | HEART RATE: 99 BPM | DIASTOLIC BLOOD PRESSURE: 95 MMHG | WEIGHT: 107 LBS | TEMPERATURE: 97.6 F | SYSTOLIC BLOOD PRESSURE: 164 MMHG

## 2021-06-29 DIAGNOSIS — I10 ESSENTIAL HYPERTENSION: ICD-10-CM

## 2021-06-29 DIAGNOSIS — K21.9 GERD WITHOUT ESOPHAGITIS: ICD-10-CM

## 2021-06-29 DIAGNOSIS — E03.9 ACQUIRED HYPOTHYROIDISM: ICD-10-CM

## 2021-06-29 PROCEDURE — 4040F PNEUMOC VAC/ADMIN/RCVD: CPT | Performed by: FAMILY MEDICINE

## 2021-06-29 PROCEDURE — G8400 PT W/DXA NO RESULTS DOC: HCPCS | Performed by: FAMILY MEDICINE

## 2021-06-29 PROCEDURE — G8420 CALC BMI NORM PARAMETERS: HCPCS | Performed by: FAMILY MEDICINE

## 2021-06-29 PROCEDURE — 1123F ACP DISCUSS/DSCN MKR DOCD: CPT | Performed by: FAMILY MEDICINE

## 2021-06-29 PROCEDURE — 99214 OFFICE O/P EST MOD 30 MIN: CPT | Performed by: FAMILY MEDICINE

## 2021-06-29 PROCEDURE — G8427 DOCREV CUR MEDS BY ELIG CLIN: HCPCS | Performed by: FAMILY MEDICINE

## 2021-06-29 PROCEDURE — 1090F PRES/ABSN URINE INCON ASSESS: CPT | Performed by: FAMILY MEDICINE

## 2021-06-29 PROCEDURE — 4004F PT TOBACCO SCREEN RCVD TLK: CPT | Performed by: FAMILY MEDICINE

## 2021-06-29 PROCEDURE — 3017F COLORECTAL CA SCREEN DOC REV: CPT | Performed by: FAMILY MEDICINE

## 2021-06-29 RX ORDER — AMLODIPINE BESYLATE 5 MG/1
5 TABLET ORAL DAILY
Qty: 30 TABLET | Refills: 1 | Status: SHIPPED
Start: 2021-06-29 | End: 2021-07-29 | Stop reason: SDUPTHER

## 2021-06-29 RX ORDER — ATORVASTATIN CALCIUM 20 MG/1
20 TABLET, FILM COATED ORAL DAILY
Qty: 90 TABLET | Refills: 1 | Status: SHIPPED
Start: 2021-06-29 | End: 2021-07-29 | Stop reason: SDUPTHER

## 2021-06-29 RX ORDER — LEVOTHYROXINE SODIUM 0.07 MG/1
75 TABLET ORAL DAILY
Qty: 90 TABLET | Refills: 1 | Status: SHIPPED
Start: 2021-06-29 | End: 2021-07-29 | Stop reason: SDUPTHER

## 2021-06-29 RX ORDER — OMEPRAZOLE 20 MG/1
20 CAPSULE, DELAYED RELEASE ORAL DAILY
Qty: 90 CAPSULE | Refills: 1 | Status: SHIPPED
Start: 2021-06-29 | End: 2021-07-29 | Stop reason: SDUPTHER

## 2021-06-29 SDOH — ECONOMIC STABILITY: FOOD INSECURITY: WITHIN THE PAST 12 MONTHS, THE FOOD YOU BOUGHT JUST DIDN'T LAST AND YOU DIDN'T HAVE MONEY TO GET MORE.: SOMETIMES TRUE

## 2021-06-29 SDOH — ECONOMIC STABILITY: FOOD INSECURITY: WITHIN THE PAST 12 MONTHS, YOU WORRIED THAT YOUR FOOD WOULD RUN OUT BEFORE YOU GOT MONEY TO BUY MORE.: OFTEN TRUE

## 2021-06-29 ASSESSMENT — ENCOUNTER SYMPTOMS
PHOTOPHOBIA: 0
EYE REDNESS: 0
SINUS PAIN: 0
RHINORRHEA: 0
BACK PAIN: 1
SHORTNESS OF BREATH: 0
EYE DISCHARGE: 0
COUGH: 0

## 2021-06-29 ASSESSMENT — SOCIAL DETERMINANTS OF HEALTH (SDOH): HOW HARD IS IT FOR YOU TO PAY FOR THE VERY BASICS LIKE FOOD, HOUSING, MEDICAL CARE, AND HEATING?: HARD

## 2021-06-29 NOTE — PROGRESS NOTES
2021    Graham Frankel    Chief Complaint   Patient presents with   [facility].     Here to establish self as a patient. HPI  History was obtained from patient. Ellen Dennsi is a 78 y.o. female who presents today with the followin. Acquired hypothyroidism    2. GERD without esophagitis    3. Essential hypertension    Patient presents to establish primary care. She was seen in this clinic previously by another physician. Patient has no new complaints today. She has chronic neck pain and takes ibuprofen for that. She has migraine headaches and occasionally takes Maxalt for that. She states she is taking all her other medications as prescribed. On her last visit her lisinopril/hydrochlorothiazide was increased from 20/12.5-20/25. No side effects from the change in medication. REVIEW OF SYMPTOMS    Review of Systems   Constitutional: Negative for chills, fatigue and fever. HENT: Negative for congestion, mouth sores, postnasal drip, rhinorrhea and sinus pain. Eyes: Negative for photophobia, discharge and redness. Respiratory: Negative for cough and shortness of breath. Cardiovascular: Negative for chest pain. Genitourinary: Negative for difficulty urinating, dysuria, hematuria and urgency. Musculoskeletal: Positive for back pain and neck pain. Neurological: Positive for headaches. Psychiatric/Behavioral: Negative for sleep disturbance.        PAST MEDICAL HISTORY  Past Medical History:   Diagnosis Date    Bronchitis     Depression     GERD (gastroesophageal reflux disease)     Hyperlipidemia     Hypertension     Irritable bowel     Kidney stone     Migraines     Thyroid disease        FAMILY HISTORY  Family History   Problem Relation Age of Onset    Arthritis Other     Heart Disease Other     High Blood Pressure Other        SOCIAL HISTORY  Social History     Socioeconomic History    Marital status:      Spouse name: None    Number of children: None    Years CURRENT MEDICATIONS  Current Outpatient Medications   Medication Sig Dispense Refill    levothyroxine (SYNTHROID) 75 MCG tablet Take 1 tablet by mouth Daily 90 tablet 1    omeprazole (PRILOSEC) 20 MG delayed release capsule Take 1 capsule by mouth daily 90 capsule 1    atorvastatin (LIPITOR) 20 MG tablet Take 1 tablet by mouth daily 90 tablet 1    amLODIPine (NORVASC) 5 MG tablet Take 1 tablet by mouth daily 30 tablet 1    lisinopril-hydroCHLOROthiazide (PRINZIDE;ZESTORETIC) 20-25 MG per tablet Take 1 tablet by mouth daily 90 tablet 1    rizatriptan (MAXALT) 10 MG tablet Take 1 tablet by mouth once as needed for Migraine May repeat in 2 hours if needed 60 tablet 0    ibuprofen (ADVIL;MOTRIN) 400 MG tablet Take 2 tablets by mouth every 6 hours as needed for Pain 180 tablet 2     No current facility-administered medications for this visit. ALLERGIES  Allergies   Allergen Reactions    Iodides Itching     IVP dye    Peanuts [Peanut Oil] Other (See Comments)     migraines    Penicillins      Doesn't recall reaction       PHYSICAL EXAM    BP (!) 164/95   Pulse 99   Temp 97.6 °F (36.4 °C) (Infrared)   Wt 107 lb (48.5 kg)   SpO2 92%   BMI 23.15 kg/m²     Physical Exam  Vitals and nursing note reviewed. Constitutional:       Appearance: Normal appearance. HENT:      Head: Normocephalic and atraumatic. Nose: Nose normal.      Mouth/Throat:      Mouth: Mucous membranes are moist.      Pharynx: Oropharynx is clear. Eyes:      General: No scleral icterus. Extraocular Movements: Extraocular movements intact. Conjunctiva/sclera: Conjunctivae normal.   Cardiovascular:      Rate and Rhythm: Normal rate and regular rhythm. Pulses: Normal pulses. Heart sounds: Normal heart sounds. Pulmonary:      Effort: Pulmonary effort is normal.      Breath sounds: Normal breath sounds. Abdominal:      Palpations: Abdomen is soft. There is no mass. Tenderness:  There is no abdominal tenderness. There is no right CVA tenderness or left CVA tenderness. Musculoskeletal:         General: No swelling. Cervical back: Neck supple. Right lower leg: No edema. Left lower leg: No edema. Lymphadenopathy:      Cervical: No cervical adenopathy. Skin:     General: Skin is warm and dry. Neurological:      General: No focal deficit present. Mental Status: She is alert and oriented to person, place, and time. Motor: No weakness. Gait: Gait normal.   Psychiatric:         Mood and Affect: Mood normal.         ASSESSMENT & PLAN    Timbo Deleon was seen today for establish care. Diagnoses and all orders for this visit:    Acquired hypothyroidism  -     levothyroxine (SYNTHROID) 75 MCG tablet; Take 1 tablet by mouth Daily    GERD without esophagitis  -     omeprazole (PRILOSEC) 20 MG delayed release capsule; Take 1 capsule by mouth daily    Essential hypertension  -     atorvastatin (LIPITOR) 20 MG tablet; Take 1 tablet by mouth daily    Other orders  -     amLODIPine (NORVASC) 5 MG tablet; Take 1 tablet by mouth daily    Patient states that she does not want any immunizations other than the one for COVID-19. Patient's blood pressure still elevated at the end of her visit. Will add amlodipine 5 mg to her current medications. Patient will follow up in 1 month for blood pressure recheck. Return in about 1 month (around 7/29/2021) for Check with your pharmacy and see if they offer the COVID-19 shot. .         Electronically signed by Suresh Mims.  Gladys Cervantes DO on 6/29/2021

## 2021-07-29 ENCOUNTER — OFFICE VISIT (OUTPATIENT)
Dept: FAMILY MEDICINE CLINIC | Age: 76
End: 2021-07-29
Payer: MEDICARE

## 2021-07-29 VITALS
RESPIRATION RATE: 18 BRPM | HEIGHT: 57 IN | SYSTOLIC BLOOD PRESSURE: 128 MMHG | BODY MASS INDEX: 22.87 KG/M2 | WEIGHT: 106 LBS | TEMPERATURE: 98.3 F | OXYGEN SATURATION: 91 % | HEART RATE: 93 BPM | DIASTOLIC BLOOD PRESSURE: 82 MMHG

## 2021-07-29 DIAGNOSIS — I10 ESSENTIAL HYPERTENSION: ICD-10-CM

## 2021-07-29 DIAGNOSIS — Z00.00 ROUTINE GENERAL MEDICAL EXAMINATION AT A HEALTH CARE FACILITY: ICD-10-CM

## 2021-07-29 DIAGNOSIS — E03.9 ACQUIRED HYPOTHYROIDISM: ICD-10-CM

## 2021-07-29 DIAGNOSIS — K21.9 GERD WITHOUT ESOPHAGITIS: ICD-10-CM

## 2021-07-29 DIAGNOSIS — G43.009 MIGRAINE WITHOUT AURA AND WITHOUT STATUS MIGRAINOSUS, NOT INTRACTABLE: ICD-10-CM

## 2021-07-29 DIAGNOSIS — Z87.891 PERSONAL HISTORY OF TOBACCO USE: ICD-10-CM

## 2021-07-29 DIAGNOSIS — Z13.820 SCREENING FOR OSTEOPOROSIS: Primary | ICD-10-CM

## 2021-07-29 PROCEDURE — 1123F ACP DISCUSS/DSCN MKR DOCD: CPT | Performed by: FAMILY MEDICINE

## 2021-07-29 PROCEDURE — 4040F PNEUMOC VAC/ADMIN/RCVD: CPT | Performed by: FAMILY MEDICINE

## 2021-07-29 PROCEDURE — G0438 PPPS, INITIAL VISIT: HCPCS | Performed by: FAMILY MEDICINE

## 2021-07-29 PROCEDURE — 3017F COLORECTAL CA SCREEN DOC REV: CPT | Performed by: FAMILY MEDICINE

## 2021-07-29 RX ORDER — IBUPROFEN 400 MG/1
400 TABLET ORAL EVERY 6 HOURS PRN
Qty: 90 TABLET | Refills: 2 | Status: SHIPPED
Start: 2021-07-29 | End: 2021-10-28 | Stop reason: SDUPTHER

## 2021-07-29 RX ORDER — OMEPRAZOLE 20 MG/1
20 CAPSULE, DELAYED RELEASE ORAL DAILY
Qty: 90 CAPSULE | Refills: 1 | Status: SHIPPED
Start: 2021-07-29 | End: 2021-10-28 | Stop reason: SDUPTHER

## 2021-07-29 RX ORDER — AMLODIPINE BESYLATE 5 MG/1
5 TABLET ORAL DAILY
Qty: 90 TABLET | Refills: 1 | Status: SHIPPED
Start: 2021-07-29 | End: 2021-08-24

## 2021-07-29 RX ORDER — LISINOPRIL AND HYDROCHLOROTHIAZIDE 25; 20 MG/1; MG/1
1 TABLET ORAL DAILY
Qty: 90 TABLET | Refills: 1 | Status: SHIPPED
Start: 2021-07-29 | End: 2021-10-28 | Stop reason: SDUPTHER

## 2021-07-29 RX ORDER — ATORVASTATIN CALCIUM 20 MG/1
20 TABLET, FILM COATED ORAL DAILY
Qty: 90 TABLET | Refills: 1 | Status: SHIPPED
Start: 2021-07-29 | End: 2021-10-28 | Stop reason: SDUPTHER

## 2021-07-29 RX ORDER — LEVOTHYROXINE SODIUM 0.07 MG/1
75 TABLET ORAL DAILY
Qty: 90 TABLET | Refills: 1 | Status: SHIPPED
Start: 2021-07-29 | End: 2021-10-28 | Stop reason: SDUPTHER

## 2021-07-29 RX ORDER — RIZATRIPTAN BENZOATE 10 MG/1
10 TABLET ORAL
Qty: 20 TABLET | Refills: 1 | Status: SHIPPED
Start: 2021-07-29 | End: 2021-10-28 | Stop reason: SDUPTHER

## 2021-07-29 ASSESSMENT — LIFESTYLE VARIABLES
HOW OFTEN DO YOU HAVE A DRINK CONTAINING ALCOHOL: 0
HOW OFTEN DO YOU HAVE A DRINK CONTAINING ALCOHOL: NEVER
AUDIT-C TOTAL SCORE: INCOMPLETE
AUDIT TOTAL SCORE: INCOMPLETE

## 2021-07-29 ASSESSMENT — PATIENT HEALTH QUESTIONNAIRE - PHQ9
1. LITTLE INTEREST OR PLEASURE IN DOING THINGS: 1
SUM OF ALL RESPONSES TO PHQ QUESTIONS 1-9: 2
2. FEELING DOWN, DEPRESSED OR HOPELESS: 1
SUM OF ALL RESPONSES TO PHQ9 QUESTIONS 1 & 2: 2
SUM OF ALL RESPONSES TO PHQ QUESTIONS 1-9: 2
SUM OF ALL RESPONSES TO PHQ QUESTIONS 1-9: 2

## 2021-07-29 NOTE — PATIENT INSTRUCTIONS
Advance Directives: Care Instructions  Overview  An advance directive is a legal way to state your wishes at the end of your life. It tells your family and your doctor what to do if you can't say what you want. There are two main types of advance directives. You can change them any time your wishes change. Living will. This form tells your family and your doctor your wishes about life support and other treatment. The form is also called a declaration. Medical power of . This form lets you name a person to make treatment decisions for you when you can't speak for yourself. This person is called a health care agent (health care proxy, health care surrogate). The form is also called a durable power of  for health care. If you do not have an advance directive, decisions about your medical care may be made by a family member, or by a doctor or a  who doesn't know you. It may help to think of an advance directive as a gift to the people who care for you. If you have one, they won't have to make tough decisions by themselves. Follow-up care is a key part of your treatment and safety. Be sure to make and go to all appointments, and call your doctor if you are having problems. It's also a good idea to know your test results and keep a list of the medicines you take. What should you include in an advance directive? Many states have a unique advance directive form. (It may ask you to address specific issues.) Or you might use a universal form that's approved by many states. If your form doesn't tell you what to address, it may be hard to know what to include in your advance directive. Use the questions below to help you get started. · Who do you want to make decisions about your medical care if you are not able to? · What life-support measures do you want if you have a serious illness that gets worse over time or can't be cured? · What are you most afraid of that might happen? (Maybe you're afraid of having pain, losing your independence, or being kept alive by machines.)  · Where would you prefer to die? (Your home? A hospital? A nursing home?)  · Do you want to donate your organs when you die? · Do you want certain Druze practices performed before you die? When should you call for help? Be sure to contact your doctor if you have any questions. Where can you learn more? Go to https://Hometapperpepiceweb.Philrealestates. org and sign in to your Algramo account. Enter R264 in the Testt box to learn more about \"Advance Directives: Care Instructions. \"     If you do not have an account, please click on the \"Sign Up Now\" link. Current as of: March 17, 2021               Content Version: 12.9  © 0115-0724 Healthwise, [employer]. Care instructions adapted under license by [facility] ([facility]). If you have questions about a medical condition or this instruction, always ask your healthcare professional. [place] [address] any warranty or liability for your use of this information. Stopping Smoking: Care Instructions  Your Care Instructions     Cigarette smokers crave the nicotine in cigarettes. Giving it up is much harder than simply changing a habit. Your body has to stop craving the nicotine. It is hard to quit, but you can do it. There are many tools that people use to quit smoking. You may find that combining tools works best for you. There are several steps to quitting. First you get ready to quit. Then you get support to help you. After that, you learn new skills and behaviors to become a nonsmoker. For many people, a necessary step is getting and using medicine. Your doctor will help you set up the plan that best meets your needs. You may want to attend a smoking cessation program to help you quit smoking. When you choose a program, look for one that has proven success. Ask your doctor for ideas.  You will greatly increase your chances of success if you take medicine as well as get counseling or join a cessation program.  Some of the changes you feel when you first quit tobacco are uncomfortable. Your body will miss the nicotine at first, and you may feel short-tempered and grumpy. You may have trouble sleeping or concentrating. Medicine can help you deal with these symptoms. You may struggle with changing your smoking habits and rituals. The last step is the tricky one: Be prepared for the smoking urge to continue for a time. This is a lot to deal with, but keep at it. You will feel better. Follow-up care is a key part of your treatment and safety. Be sure to make and go to all appointments, and call your doctor if you are having problems. It's also a good idea to know your test results and keep a list of the medicines you take. How can you care for yourself at home? · Ask your family, friends, and coworkers for support. You have a better chance of quitting if you have help and support. · Join a support group, such as Nicotine Anonymous, for people who are trying to quit smoking. · Consider signing up for a smoking cessation program, such as the American Lung Association's Freedom from Smoking program.  · Get text messaging support. Go to the website at www.smokefree. gov to sign up for the [facility] program.  · Set a quit date. Pick your date carefully so that it is not right in the middle of a big deadline or stressful time. Once you quit, do not even take a puff. Get rid of all ashtrays and lighters after your last cigarette. Clean your house and your clothes so that they do not smell of smoke. · Learn how to be a nonsmoker. Think about ways you can avoid those things that make you reach for a cigarette. ? Avoid situations that put you at greatest risk for smoking. For some people, it is hard to have a drink with friends without smoking. For others, they might skip a coffee break with coworkers who smoke. ? Change your daily routine.  Take a different route to work or eat a meal in a different place. · Cut down on stress. Calm yourself or release tension by doing an activity you enjoy, such as reading a book, taking a hot bath, or gardening. · Talk to your doctor or pharmacist about nicotine replacement therapy, which replaces the nicotine in your body. You still get nicotine but you do not use tobacco. Nicotine replacement products help you slowly reduce the amount of nicotine you need. These products come in several forms, many of them available over-the-counter:  ? Nicotine patches  ? Nicotine gum and lozenges  ? Nicotine inhaler  · Ask your doctor about bupropion (Wellbutrin) or varenicline (Chantix), which are prescription medicines. They do not contain nicotine. They help you by reducing withdrawal symptoms, such as stress and anxiety. · Some people find hypnosis, acupuncture, and massage helpful for ending the smoking habit. · Eat a healthy diet and get regular exercise. Having healthy habits will help your body move past its craving for nicotine. · Be prepared to keep trying. Most people are not successful the first few times they try to quit. Do not get mad at yourself if you smoke again. Make a list of things you learned and think about when you want to try again, such as next week, next month, or next year. Where can you learn more? Go to https://Authorea.Laurel & Wolf. org and sign in to your Rapid7 account. Enter C683 in the [facility] box to learn more about \"Stopping Smoking: Care Instructions. \"     If you do not have an account, please click on the \"Sign Up Now\" link. Current as of: February 11, 2021               Content Version: 12.9  © 7197-6180 Healthwise, [employer]. Care instructions adapted under license by [facility] ([facility]).  If you have questions about a medical condition or this instruction, always ask your healthcare professional. Sirisha Thompson any warranty or liability for your use of this information. Personalized Preventive Plan for Wilder Carvajal - 7/29/2021  Medicare offers a range of preventive health benefits. Some of the tests and screenings are paid in full while other may be subject to a deductible, co-insurance, and/or copay. Some of these benefits include a comprehensive review of your medical history including lifestyle, illnesses that may run in your family, and various assessments and screenings as appropriate. After reviewing your medical record and screening and assessments performed today your provider may have ordered immunizations, labs, imaging, and/or referrals for you. A list of these orders (if applicable) as well as your Preventive Care list are included within your After Visit Summary for your review. Other Preventive Recommendations:    · A preventive eye exam performed by an eye specialist is recommended every 1-2 years to screen for glaucoma; cataracts, macular degeneration, and other eye disorders. · A preventive dental visit is recommended every 6 months. · Try to get at least 150 minutes of exercise per week or 10,000 steps per day on a pedometer . · Order or download the FREE \"Exercise & Physical Activity: Your Everyday Guide\" from [employer]. Call 0-394.629.4769 or search [employer] online. · You need 6923-1951 mg of calcium and 8907-0518 IU of vitamin D per day. It is possible to meet your calcium requirement with diet alone, but a vitamin D supplement is usually necessary to meet this goal.  · When exposed to the sun, use a sunscreen that protects against both UVA and UVB radiation with an SPF of 30 or greater. Reapply every 2 to 3 hours or after sweating, drying off with a towel, or swimming. · Always wear a seat belt when traveling in a car. Always wear a helmet when riding a bicycle or motorcycle.

## 2021-07-29 NOTE — PROGRESS NOTES
Medicare Annual Wellness Visit  Name: Aldo Leon Date: 2021   MRN: <[id number]> Sex: Female   Age: 78 y.o. Ethnicity: Non- / Non    : 1945 Race: White (non-)      Hunter Meredith is here for Medicare AWV    Screenings for behavioral, psychosocial and functional/safety risks, and cognitive dysfunction are all negative except as indicated below. These results, as well as other patient data from the [address] [facility] form, are documented in Flowsheets linked to this Encounter. Allergies   Allergen Reactions    Iodides Itching     IVP dye    Peanuts [Peanut Oil] Other (See Comments)     migraines    Penicillins      Doesn't recall reaction       Prior to Visit Medications    Medication Sig Taking?  Authorizing Provider   levothyroxine (SYNTHROID) 75 MCG tablet Take 1 tablet by mouth Daily Yes Rebecca Salgado DO   omeprazole (PRILOSEC) 20 MG delayed release capsule Take 1 capsule by mouth daily Yes Rebecca Salgado DO   atorvastatin (LIPITOR) 20 MG tablet Take 1 tablet by mouth daily Yes Rebecca Salgado DO   amLODIPine (NORVASC) 5 MG tablet Take 1 tablet by mouth daily Yes Rebecca Salgado DO   lisinopril-hydroCHLOROthiazide (PRINZIDE;ZESTORETIC) 20-25 MG per tablet Take 1 tablet by mouth daily Yes Awais Hernandez DO   rizatriptan (MAXALT) 10 MG tablet Take 1 tablet by mouth once as needed for Migraine May repeat in 2 hours if needed Yes Renato Hernandez DO   ibuprofen (ADVIL;MOTRIN) 400 MG tablet Take 2 tablets by mouth every 6 hours as needed for Pain Yes Maru Gomez DO       Past Medical History:   Diagnosis Date    Bronchitis     Depression     GERD (gastroesophageal reflux disease)     Hyperlipidemia     Hypertension     Irritable bowel     Kidney stone     Migraines     Thyroid disease        Past Surgical History:   Procedure Laterality Date    APPENDECTOMY      BACK SURGERY      cervical disc surgery    HYSTERECTOMY  NERVE BLOCK Left 09/07/2016    sacroiliac joint #1    NERVE BLOCK Left 09/19/2016    si inj #2    NOSE SURGERY      bone removed d/t breathing    OTHER SURGICAL HISTORY Left 11/07/2016    sacroiliac joint radiofrequency    OTHER SURGICAL HISTORY Left 08/07/2017    radiofrequency, sacroiliac    SPINE SURGERY      SPLENECTOMY  age 23    d/t a tear etiology unknown    TONSILLECTOMY         Family History   Problem Relation Age of Onset    Arthritis Other     Heart Disease Other     High Blood Pressure Other        CareTeam (Including outside providers/suppliers regularly involved in providing care):   Patient Care Team:  Gloria Reyes DO as PCP - General (Family Medicine)  Gloria Reyes DO as PCP - [facility] Empaneled Provider    Wt Readings from Last 3 Encounters:   07/29/21 106 lb (48.1 kg)   06/29/21 107 lb (48.5 kg)   03/25/21 109 lb (49.4 kg)     Vitals:    07/29/21 1028   BP: 128/82   Pulse: 93   Resp: 18   Temp: 98.3 °F (36.8 °C)   SpO2: 91%   Weight: 106 lb (48.1 kg)   Height: 4' 9\" (1.448 m)     Body mass index is 22.94 kg/m². Based upon direct observation of the patient, evaluation of cognition reveals recent and remote memory intact.     General Appearance: alert and oriented to person, place and time, well developed and well- nourished, in no acute distress  Skin: warm and dry, no rash or erythema  Head: normocephalic and atraumatic  Eyes: pupils equal, round, and reactive to light, extraocular eye movements intact, conjunctivae normal  ENT: tympanic membrane, external ear and ear canal normal bilaterally, nose without deformity, nasal mucosa and turbinates normal without polyps  Neck: supple and non-tender without mass, no thyromegaly or thyroid nodules, no cervical lymphadenopathy  Pulmonary/Chest: clear to auscultation bilaterally- no wheezes, rales or rhonchi, normal air movement, no respiratory distress  Cardiovascular: normal rate, regular rhythm, normal S1 and S2, no murmurs, rubs, clicks, or gallops, distal pulses intact, no carotid bruits  Abdomen: soft, non-tender, non-distended, normal bowel sounds, no masses or organomegaly  Extremities: no cyanosis, clubbing or edema  Musculoskeletal: normal range of motion, no joint swelling, deformity or tenderness  Neurologic: reflexes normal and symmetric, no cranial nerve deficit, gait, coordination and speech normal    Patient's complete Health Risk Assessment and screening values have been reviewed and are found in Flowsheets. The following problems were reviewed today and where indicated follow up appointments were made and/or referrals ordered. Positive Risk Factor Screenings with Interventions:         Substance History:  Social History     Tobacco History     Smoking Status  Current Every Day Smoker Smoking Frequency  0.5 packs/day for 50 years (25 pk yrs) Smoking Tobacco Type  Cigarettes    Smokeless Tobacco Use  Never Used          Alcohol History     Alcohol Use Status  No          Drug Use     Drug Use Status  No          Sexual Activity     Sexually Active  Not Currently               Alcohol Screening:       A score of 8 or more is associated with harmful or hazardous drinking. A score of 13 or more in women, and 15 or more in men, is likely to indicate alcohol dependence. Substance Abuse Interventions:  · Tobacco abuse:  tobacco cessation tips and resources provided    General Health and ACP:  General  In general, how would you say your health is?: Fair  In the past 7 days, have you experienced any of the following?  New or Increased Pain, New or Increased Fatigue, Loneliness, Social Isolation, Stress or Anger?: None of These  Do you get the social and emotional support that you need?: Yes  Do you have a Living Will?: (!) No  Advance Directives     Power of [address] [place] [address] Will ACP-Advance Directive ACP-Power of     Not on File Not on File Not on File Not on File      General Health Risk Interventions:  · No Living Will: Patient declines ACP discussion/assistance    Health Habits/Nutrition:  Health Habits/Nutrition  Do you exercise for at least 20 minutes 2-3 times per week?: (!) No  Have you lost any weight without trying in the past 3 months?: No  Do you eat only one meal per day?: No  Have you seen the dentist within the past year?: (!) No  Body mass index: 22.94  Health Habits/Nutrition Interventions:  · Inadequate physical activity:  patient is not ready to increase his/her physical activity level at this time    Hearing/Vision:  No exam data present  Hearing/Vision  Do you or your family notice any trouble with your hearing that hasn't been managed with hearing aids?: No  Do you have difficulty driving, watching TV, or doing any of your daily activities because of your eyesight?: No  Have you had an eye exam within the past year?: (!) No  Hearing/Vision Interventions:  · Hearing concerns:  patient declines any further evaluation/treatment for hearing issues    Safety:  Safety  Do you have working smoke detectors?: Yes  Have all throw rugs been removed or fastened?: Yes  Do you have non-slip mats or surfaces in all bathtubs/showers?: (!) No  Do all of your stairways have a railing or banister?: Yes  Are your doorways, halls and stairs free of clutter?: Yes  Do you always fasten your seatbelt when you are in a car?: Yes  Safety Interventions:  · Home safety tips provided     Personalized Preventive Plan   Current Health Maintenance Status  Immunization History   Administered Date(s) Administered    Influenza Vaccine, unspecified formulation 09/23/2011, 10/19/2015, 02/11/2017, 02/28/2018    Influenza, High Dose (Fluzone 65 yrs and older) 10/24/2018    Pneumococcal Conjugate 13-valent (Idella Perrin) 10/19/2015    Pneumococcal Conjugate Vaccine 02/28/2018        Health Maintenance   Topic Date Due    COVID-19 Vaccine (1) Never done    DEXA (modify frequency per FRAX score)  Never done   [employer] Visit (AWV)  Never done    Meningococcal (ACWY) vaccine (1 - Risk start before 7 months 4-dose series) 07/29/2021 (Originally 1945)    DTaP/Tdap/Td vaccine (1 - Tdap) 06/29/2022 (Originally 1/3/1966)    Shingles Vaccine (1 of 2) 06/29/2022 (Originally 1/3/1997)    Hib vaccine (1 of 1 - Risk 1-dose series) 07/29/2022 (Originally 4/3/1946)    Meningococcal B vaccine (1 of 4 - Increased Risk Bexsero 2-dose series) 07/29/2022 (Originally 1/3/1957)    Pneumococcal 65+ yrs at Risk Vaccine (2 of 2 - PPSV23) 07/01/2024 (Originally 2/28/2019)    Flu vaccine (1) 09/01/2021    Lipid screen  03/17/2022    Potassium monitoring  03/17/2022    Creatinine monitoring  03/17/2022    Colon cancer screen colonoscopy  09/19/2022    Hepatitis C screen  Completed    Hepatitis A vaccine  Aged Out    Hepatitis B vaccine  Aged Out    Breast cancer screen  Discontinued    Low dose CT lung screening  Discontinued     Recommendations for Preventive Services Due: see orders and patient instructions/AVS.  . Recommended screening schedule for the next 5-10 years is provided to the patient in written form: see Patient Instructions/AVS.    Kayleigh Reis was seen today for medicare aw. Diagnoses and all orders for this visit:    Screening for osteoporosis    Migraine without aura and without status migrainosus, not intractable    GERD without esophagitis    Acquired hypothyroidism    Essential hypertension    Personal history of tobacco use    Routine general medical examination at a health care facility                   Tobacco Cessation Counseling: Patient advised about behavior change, including information about personal health harms, usage of appropriate cessation measures and benefits of cessation. Time spent (minutes): 5    LDCT Screening: Discussed with patient the benefits and harms of screening, follow-up diagnostic testing, over-diagnosis, false positive rate, and total radiation exposure.  Counseled on the importance of adherence to annual lung cancer LDCT screening, impact of comorbidities, ability and willingness to undergo diagnosis and treatment. Counseled on the importance of maintaining cigarette smoking abstinence and cessation. Patient has a history of heavy tobacco use of over 30 pack years. Patient does not present signs or symptoms of lung cancer.

## 2021-08-24 ENCOUNTER — OFFICE VISIT (OUTPATIENT)
Dept: FAMILY MEDICINE CLINIC | Age: 76
End: 2021-08-24
Payer: MEDICARE

## 2021-08-24 VITALS
OXYGEN SATURATION: 95 % | RESPIRATION RATE: 18 BRPM | DIASTOLIC BLOOD PRESSURE: 80 MMHG | TEMPERATURE: 97.7 F | SYSTOLIC BLOOD PRESSURE: 130 MMHG | HEART RATE: 98 BPM

## 2021-08-24 DIAGNOSIS — M10.9 ACUTE GOUT OF LEFT ANKLE, UNSPECIFIED CAUSE: Primary | ICD-10-CM

## 2021-08-24 PROCEDURE — G8420 CALC BMI NORM PARAMETERS: HCPCS | Performed by: FAMILY MEDICINE

## 2021-08-24 PROCEDURE — 1123F ACP DISCUSS/DSCN MKR DOCD: CPT | Performed by: FAMILY MEDICINE

## 2021-08-24 PROCEDURE — 99214 OFFICE O/P EST MOD 30 MIN: CPT | Performed by: FAMILY MEDICINE

## 2021-08-24 PROCEDURE — 1090F PRES/ABSN URINE INCON ASSESS: CPT | Performed by: FAMILY MEDICINE

## 2021-08-24 PROCEDURE — G8427 DOCREV CUR MEDS BY ELIG CLIN: HCPCS | Performed by: FAMILY MEDICINE

## 2021-08-24 PROCEDURE — 3017F COLORECTAL CA SCREEN DOC REV: CPT | Performed by: FAMILY MEDICINE

## 2021-08-24 PROCEDURE — G8400 PT W/DXA NO RESULTS DOC: HCPCS | Performed by: FAMILY MEDICINE

## 2021-08-24 PROCEDURE — 4004F PT TOBACCO SCREEN RCVD TLK: CPT | Performed by: FAMILY MEDICINE

## 2021-08-24 PROCEDURE — 4040F PNEUMOC VAC/ADMIN/RCVD: CPT | Performed by: FAMILY MEDICINE

## 2021-08-24 RX ORDER — PREDNISONE 20 MG/1
TABLET ORAL
Qty: 10 TABLET | Refills: 0 | Status: SHIPPED
Start: 2021-08-24 | End: 2022-05-05

## 2021-08-24 ASSESSMENT — ENCOUNTER SYMPTOMS
SHORTNESS OF BREATH: 0
PHOTOPHOBIA: 0
COUGH: 0
EYE REDNESS: 0
EYE DISCHARGE: 0

## 2021-08-24 NOTE — PROGRESS NOTES
Waqra Larose (:  1945) is a 78 y.o. female,Established patient, here for evaluation of the following chief complaint(s):  Dizziness (Patient did stop her bp medication. She is still having some dizziness.) and Ankle Injury (Patient hurt her left ankle. She does not know what she did. She thinks she may have twisted it getting out of bed. She is in a lot of pain.)         ASSESSMENT/PLAN:  1. Acute gout of left ankle, unspecified cause  -     predniSONE (DELTASONE) 20 MG tablet; Take 2 tablets daily for 3 days then 1 tablet daily for 4 days, Disp-10 tablet, R-0Normal  Patient should expect significant improvement in her pain by tomorrow morning. If she gets no relief from the prednisone she is to call me at the office. No x-rays done today as patient has had no injury. Return if symptoms worsen or fail to improve. Subjective   SUBJECTIVE/OBJECTIVE:  Patient presents with a 3 to 4-day history of left ankle pain. Patient states this started with very mild pain and is slowly increased to where is very difficult to walk secondary to the pain. She states that his sensitive to even light touch now. Patient denies any injury or previous problems with her ankle. She does not have a history of gout. Review of Systems   Constitutional: Negative for chills, fatigue and fever. Eyes: Negative for photophobia, discharge and redness. Respiratory: Negative for cough and shortness of breath. Cardiovascular: Negative for chest pain. Musculoskeletal: Positive for arthralgias (Left ankle). Objective   Physical Exam  Vitals and nursing note reviewed. Constitutional:       Appearance: Normal appearance. Musculoskeletal:      Right ankle: Normal.      Left ankle: Ecchymosis (Lateral aspect) present. Tenderness (Lateral aspect even to light touch) present. Decreased range of motion. Neurological:      Mental Status: She is alert.                   An electronic signature was used to authenticate this note. --Estrellita Number.  Elaine Mates

## 2021-10-28 DIAGNOSIS — E03.9 ACQUIRED HYPOTHYROIDISM: ICD-10-CM

## 2021-10-28 DIAGNOSIS — G43.009 MIGRAINE WITHOUT AURA AND WITHOUT STATUS MIGRAINOSUS, NOT INTRACTABLE: ICD-10-CM

## 2021-10-28 DIAGNOSIS — I10 ESSENTIAL HYPERTENSION: ICD-10-CM

## 2021-10-28 DIAGNOSIS — K21.9 GERD WITHOUT ESOPHAGITIS: ICD-10-CM

## 2021-10-28 RX ORDER — IBUPROFEN 400 MG/1
400 TABLET ORAL EVERY 6 HOURS PRN
Qty: 90 TABLET | Refills: 2 | Status: SHIPPED
Start: 2021-10-28 | End: 2022-05-05 | Stop reason: SDUPTHER

## 2021-10-28 RX ORDER — RIZATRIPTAN BENZOATE 10 MG/1
10 TABLET ORAL
Qty: 20 TABLET | Refills: 1 | Status: SHIPPED
Start: 2021-10-28 | End: 2022-05-05 | Stop reason: SDUPTHER

## 2021-10-28 RX ORDER — ATORVASTATIN CALCIUM 20 MG/1
20 TABLET, FILM COATED ORAL DAILY
Qty: 90 TABLET | Refills: 1 | Status: SHIPPED
Start: 2021-10-28 | End: 2022-05-05 | Stop reason: SDUPTHER

## 2021-10-28 RX ORDER — LISINOPRIL AND HYDROCHLOROTHIAZIDE 25; 20 MG/1; MG/1
1 TABLET ORAL DAILY
Qty: 90 TABLET | Refills: 1 | Status: SHIPPED
Start: 2021-10-28 | End: 2022-05-05 | Stop reason: SDUPTHER

## 2021-10-28 RX ORDER — OMEPRAZOLE 20 MG/1
20 CAPSULE, DELAYED RELEASE ORAL DAILY
Qty: 90 CAPSULE | Refills: 1 | Status: SHIPPED
Start: 2021-10-28 | End: 2022-05-05 | Stop reason: SDUPTHER

## 2021-10-28 RX ORDER — LEVOTHYROXINE SODIUM 0.07 MG/1
75 TABLET ORAL DAILY
Qty: 90 TABLET | Refills: 1 | Status: SHIPPED
Start: 2021-10-28 | End: 2022-05-05 | Stop reason: SDUPTHER

## 2022-05-05 ENCOUNTER — HOSPITAL ENCOUNTER (OUTPATIENT)
Dept: GENERAL RADIOLOGY | Age: 77
Discharge: HOME | DRG: 640 | End: 2022-05-07
Payer: MEDICARE

## 2022-05-05 ENCOUNTER — HOSPITAL ENCOUNTER (OUTPATIENT)
Age: 77
Discharge: HOME | DRG: 640 | End: 2022-05-07
Payer: MEDICARE

## 2022-05-05 ENCOUNTER — HOSPITAL ENCOUNTER (OUTPATIENT)
Age: 77
Discharge: HOME | DRG: 640 | End: 2022-05-05
Payer: MEDICARE

## 2022-05-05 ENCOUNTER — OFFICE VISIT (OUTPATIENT)
Dept: FAMILY MEDICINE CLINIC | Age: 77
End: 2022-05-05
Payer: MEDICARE

## 2022-05-05 VITALS
RESPIRATION RATE: 20 BRPM | SYSTOLIC BLOOD PRESSURE: 110 MMHG | DIASTOLIC BLOOD PRESSURE: 62 MMHG | BODY MASS INDEX: 22.7 KG/M2 | OXYGEN SATURATION: 93 % | HEIGHT: 57 IN | WEIGHT: 105.2 LBS | HEART RATE: 91 BPM | TEMPERATURE: 98.1 F

## 2022-05-05 DIAGNOSIS — I10 ESSENTIAL HYPERTENSION: ICD-10-CM

## 2022-05-05 DIAGNOSIS — G89.29 CHRONIC PAIN OF BOTH SHOULDERS: ICD-10-CM

## 2022-05-05 DIAGNOSIS — E03.9 ACQUIRED HYPOTHYROIDISM: Primary | ICD-10-CM

## 2022-05-05 DIAGNOSIS — M25.511 CHRONIC PAIN OF BOTH SHOULDERS: ICD-10-CM

## 2022-05-05 DIAGNOSIS — K21.9 GERD WITHOUT ESOPHAGITIS: ICD-10-CM

## 2022-05-05 DIAGNOSIS — M25.512 CHRONIC PAIN OF BOTH SHOULDERS: ICD-10-CM

## 2022-05-05 DIAGNOSIS — E03.9 ACQUIRED HYPOTHYROIDISM: ICD-10-CM

## 2022-05-05 DIAGNOSIS — G43.009 MIGRAINE WITHOUT AURA AND WITHOUT STATUS MIGRAINOSUS, NOT INTRACTABLE: ICD-10-CM

## 2022-05-05 LAB
ALBUMIN SERPL-MCNC: 4.2 G/DL (ref 3.5–5.2)
ALP BLD-CCNC: 117 U/L (ref 35–104)
ALT SERPL-CCNC: 22 U/L (ref 0–32)
ANION GAP SERPL CALCULATED.3IONS-SCNC: 14 MMOL/L (ref 7–16)
AST SERPL-CCNC: 29 U/L (ref 0–31)
BILIRUB SERPL-MCNC: 0.4 MG/DL (ref 0–1.2)
BUN BLDV-MCNC: 11 MG/DL (ref 6–23)
CALCIUM SERPL-MCNC: 10.1 MG/DL (ref 8.6–10.2)
CHLORIDE BLD-SCNC: 87 MMOL/L (ref 98–107)
CHOLESTEROL, TOTAL: 192 MG/DL (ref 0–199)
CO2: 36 MMOL/L (ref 22–29)
CREAT SERPL-MCNC: 0.6 MG/DL (ref 0.5–1)
GFR AFRICAN AMERICAN: >60
GFR NON-AFRICAN AMERICAN: >60 ML/MIN/1.73
GLUCOSE BLD-MCNC: 126 MG/DL (ref 74–99)
HDLC SERPL-MCNC: 53 MG/DL
LDL CHOLESTEROL CALCULATED: 91 MG/DL (ref 0–99)
POTASSIUM SERPL-SCNC: 2.2 MMOL/L (ref 3.5–5)
SODIUM BLD-SCNC: 137 MMOL/L (ref 132–146)
TOTAL PROTEIN: 7.6 G/DL (ref 6.4–8.3)
TRIGL SERPL-MCNC: 241 MG/DL (ref 0–149)
TSH SERPL DL<=0.05 MIU/L-ACNC: 1.54 UIU/ML (ref 0.27–4.2)
VLDLC SERPL CALC-MCNC: 48 MG/DL

## 2022-05-05 PROCEDURE — 4004F PT TOBACCO SCREEN RCVD TLK: CPT | Performed by: FAMILY MEDICINE

## 2022-05-05 PROCEDURE — 1123F ACP DISCUSS/DSCN MKR DOCD: CPT | Performed by: FAMILY MEDICINE

## 2022-05-05 PROCEDURE — 73030 X-RAY EXAM OF SHOULDER: CPT

## 2022-05-05 PROCEDURE — 1090F PRES/ABSN URINE INCON ASSESS: CPT | Performed by: FAMILY MEDICINE

## 2022-05-05 PROCEDURE — 4040F PNEUMOC VAC/ADMIN/RCVD: CPT | Performed by: FAMILY MEDICINE

## 2022-05-05 PROCEDURE — 36415 COLL VENOUS BLD VENIPUNCTURE: CPT

## 2022-05-05 PROCEDURE — 84443 ASSAY THYROID STIM HORMONE: CPT

## 2022-05-05 PROCEDURE — G8420 CALC BMI NORM PARAMETERS: HCPCS | Performed by: FAMILY MEDICINE

## 2022-05-05 PROCEDURE — G8400 PT W/DXA NO RESULTS DOC: HCPCS | Performed by: FAMILY MEDICINE

## 2022-05-05 PROCEDURE — 80053 COMPREHEN METABOLIC PANEL: CPT

## 2022-05-05 PROCEDURE — 80061 LIPID PANEL: CPT

## 2022-05-05 PROCEDURE — 3017F COLORECTAL CA SCREEN DOC REV: CPT | Performed by: FAMILY MEDICINE

## 2022-05-05 PROCEDURE — G8427 DOCREV CUR MEDS BY ELIG CLIN: HCPCS | Performed by: FAMILY MEDICINE

## 2022-05-05 PROCEDURE — 99214 OFFICE O/P EST MOD 30 MIN: CPT | Performed by: FAMILY MEDICINE

## 2022-05-05 RX ORDER — OMEPRAZOLE 20 MG/1
20 CAPSULE, DELAYED RELEASE ORAL DAILY
Qty: 90 CAPSULE | Refills: 1 | Status: SHIPPED
Start: 2022-05-05 | End: 2022-10-18 | Stop reason: SDUPTHER

## 2022-05-05 RX ORDER — LISINOPRIL AND HYDROCHLOROTHIAZIDE 25; 20 MG/1; MG/1
1 TABLET ORAL DAILY
Qty: 90 TABLET | Refills: 1 | Status: ON HOLD
Start: 2022-05-05 | End: 2022-05-09 | Stop reason: HOSPADM

## 2022-05-05 RX ORDER — ATORVASTATIN CALCIUM 20 MG/1
20 TABLET, FILM COATED ORAL DAILY
Qty: 90 TABLET | Refills: 1 | Status: SHIPPED
Start: 2022-05-05 | End: 2022-10-18 | Stop reason: SDUPTHER

## 2022-05-05 RX ORDER — RIZATRIPTAN BENZOATE 10 MG/1
10 TABLET ORAL
Qty: 20 TABLET | Refills: 1 | Status: SHIPPED
Start: 2022-05-05 | End: 2022-10-18 | Stop reason: SDUPTHER

## 2022-05-05 RX ORDER — LEVOTHYROXINE SODIUM 0.07 MG/1
75 TABLET ORAL DAILY
Qty: 90 TABLET | Refills: 1 | Status: SHIPPED
Start: 2022-05-05 | End: 2022-10-18 | Stop reason: SDUPTHER

## 2022-05-05 RX ORDER — IBUPROFEN 400 MG/1
400 TABLET ORAL EVERY 6 HOURS PRN
Qty: 90 TABLET | Refills: 2 | Status: SHIPPED
Start: 2022-05-05 | End: 2022-10-18 | Stop reason: SDUPTHER

## 2022-05-05 ASSESSMENT — ENCOUNTER SYMPTOMS
SINUS PAIN: 0
EYE REDNESS: 0
PHOTOPHOBIA: 0
SHORTNESS OF BREATH: 0
COUGH: 0
EYE DISCHARGE: 0
RHINORRHEA: 0

## 2022-05-05 NOTE — PROGRESS NOTES
2022    Cindy Clark    Chief Complaint   Patient presents with    Pain     Patient states \"bones are killing me\". She has pain in her right wrist, right shoulder, back.  Shortness of Breath     Patient states when she wakes up in the morning she feels dizzy and is short of breath. Not too bad throughout the day. When checking her SpO2 today, she was 88. I had her take her mask off and rechecked it. She was 93. HPI  History was obtained from patient. Deon Oquendo is a 78 y.o. female who presents today with the followin. Acquired hypothyroidism    2. Essential hypertension    3. Chronic pain of both shoulders    4. Migraine without aura and without status migrainosus, not intractable    5. GERD without esophagitis       Patient is here for follow-up of hypertension and hypothyroidism. Patient has been taking all of her medications as prescribed. Her biggest complaint today is bilateral shoulder pain. She states that any movement of her arms is painful in the anterior shoulders. This is been a long-term problem for her. REVIEW OF SYMPTOMS    Review of Systems   Constitutional: Negative for chills, fatigue and fever. HENT: Negative for congestion, mouth sores, postnasal drip, rhinorrhea and sinus pain. Eyes: Negative for photophobia, discharge and redness. Respiratory: Negative for cough and shortness of breath. Cardiovascular: Negative for chest pain. Genitourinary: Negative for difficulty urinating, dysuria, hematuria and urgency. Musculoskeletal: Positive for arthralgias (Bilateral shoulders). Neurological: Negative for headaches.        PAST MEDICAL HISTORY  Past Medical History:   Diagnosis Date    Bronchitis     Depression     GERD (gastroesophageal reflux disease)     Hyperlipidemia     Hypertension     Irritable bowel     Kidney stone     Migraines     Thyroid disease        FAMILY HISTORY  Family History   Problem Relation Age of Onset    Arthritis Other     Heart Disease Other     High Blood Pressure Other        SOCIAL HISTORY  Social History     Socioeconomic History    Marital status:      Spouse name: None    Number of children: None    Years of education: None    Highest education level: None   Occupational History    None   Tobacco Use    Smoking status: Current Every Day Smoker     Packs/day: 0.50     Years: 50.00     Pack years: 25.00     Types: Cigarettes    Smokeless tobacco: Never Used   Substance and Sexual Activity    Alcohol use: No    Drug use: No    Sexual activity: Not Currently   Other Topics Concern    None   Social History Narrative    None     Social Determinants of Health     Financial Resource Strain: High Risk    Difficulty of Paying Living Expenses: Hard   Food Insecurity: Food Insecurity Present    Worried About Running Out of Food in the Last Year: Often true    Luiz of Food in the Last Year: Sometimes true   Transportation Needs:     Lack of Transportation (Medical): Not on file    Lack of Transportation (Non-Medical):  Not on file   Physical Activity:     Days of Exercise per Week: Not on file    Minutes of Exercise per Session: Not on file   Stress:     Feeling of Stress : Not on file   Social Connections:     Frequency of Communication with Friends and Family: Not on file    Frequency of Social Gatherings with Friends and Family: Not on file    Attends Evangelical Services: Not on file    Active Member of [address] [place] [address] or Organizations: Not on file    Attends Club or Organization Meetings: Not on file    Marital Status: Not on file   Intimate Partner Violence:     Fear of Current or Ex-Partner: Not on file    Emotionally Abused: Not on file    Physically Abused: Not on file    Sexually Abused: Not on file   Housing Stability:     Unable to Pay for Housing in the Last Year: Not on file    Number of [place] in the Last Year: Not on file    Unstable Housing in the Last Year: Not on file        SURGICAL HISTORY  Past Surgical History:   Procedure Laterality Date    APPENDECTOMY      BACK SURGERY  2011    cervical disc surgery    HYSTERECTOMY      NERVE BLOCK Left 09/07/2016    sacroiliac joint #1    NERVE BLOCK Left 09/19/2016    si inj #2    NOSE SURGERY      bone removed d/t breathing    OTHER SURGICAL HISTORY Left 11/07/2016    sacroiliac joint radiofrequency    OTHER SURGICAL HISTORY Left 08/07/2017    radiofrequency, sacroiliac    SPINE SURGERY      SPLENECTOMY  age 23    d/t a tear etiology unknown    TONSILLECTOMY                   CURRENT MEDICATIONS  Current Outpatient Medications   Medication Sig Dispense Refill    lisinopril-hydroCHLOROthiazide (PRINZIDE;ZESTORETIC) 20-25 MG per tablet Take 1 tablet by mouth daily 90 tablet 1    rizatriptan (MAXALT) 10 MG tablet Take 1 tablet by mouth once as needed for Migraine May repeat in 2 hours if needed 20 tablet 1    atorvastatin (LIPITOR) 20 MG tablet Take 1 tablet by mouth daily 90 tablet 1    omeprazole (PRILOSEC) 20 MG delayed release capsule Take 1 capsule by mouth daily 90 capsule 1    levothyroxine (SYNTHROID) 75 MCG tablet Take 1 tablet by mouth Daily 90 tablet 1    ibuprofen (ADVIL;MOTRIN) 400 MG tablet Take 1 tablet by mouth every 6 hours as needed for Pain 90 tablet 2     No current facility-administered medications for this visit. ALLERGIES  Allergies   Allergen Reactions    Iodides Itching     IVP dye    Peanuts [Peanut Oil] Other (See Comments)     migraines    Penicillins      Doesn't recall reaction       PHYSICAL EXAM    /62   Pulse 91   Temp 98.1 °F (36.7 °C)   Resp 20   Ht 4' 9\" (1.448 m)   Wt 105 lb 3.2 oz (47.7 kg)   SpO2 93%   BMI 22.77 kg/m²     Physical Exam  Vitals and nursing note reviewed. Constitutional:       Appearance: Normal appearance.    HENT:      Right Ear: Tympanic membrane and ear canal normal.      Left Ear: Tympanic membrane and ear canal normal.      Nose: No congestion or rhinorrhea. Mouth/Throat:      Mouth: Mucous membranes are moist.      Pharynx: Oropharynx is clear. Eyes:      Conjunctiva/sclera: Conjunctivae normal.   Cardiovascular:      Rate and Rhythm: Normal rate and regular rhythm. Heart sounds: Normal heart sounds. Pulmonary:      Effort: Pulmonary effort is normal.      Breath sounds: Normal breath sounds. Musculoskeletal:      Right shoulder: Tenderness (Anterior) present. Decreased range of motion. Left shoulder: Tenderness (Anterior) present. Decreased range of motion. Cervical back: Neck supple. Skin:     General: Skin is warm. Neurological:      Mental Status: She is alert. ASSESSMENT & PLAN    Mamie John was seen today for pain and shortness of breath. Diagnoses and all orders for this visit:    Acquired hypothyroidism  -     TSH; Future  -     levothyroxine (SYNTHROID) 75 MCG tablet; Take 1 tablet by mouth Daily    Essential hypertension  -     Comprehensive Metabolic Panel; Future  -     LIPID PANEL; Future  -     lisinopril-hydroCHLOROthiazide (PRINZIDE;ZESTORETIC) 20-25 MG per tablet; Take 1 tablet by mouth daily  -     atorvastatin (LIPITOR) 20 MG tablet; Take 1 tablet by mouth daily    Chronic pain of both shoulders  -     XR SHOULDER LEFT (MIN 2 VIEWS); Future  -     XR SHOULDER RIGHT (MIN 2 VIEWS); Future  -     [address] [facility] Evangelist Blakely DO, Orthopaedics and Sports Medicine, [place]    Migraine without aura and without status migrainosus, not intractable  -     rizatriptan (MAXALT) 10 MG tablet; Take 1 tablet by mouth once as needed for Migraine May repeat in 2 hours if needed  -     ibuprofen (ADVIL;MOTRIN) 400 MG tablet; Take 1 tablet by mouth every 6 hours as needed for Pain    GERD without esophagitis  -     omeprazole (PRILOSEC) 20 MG delayed release capsule; Take 1 capsule by mouth daily    Patient will continue with her current medications.   She has been referred for x-rays of the bilateral shoulders and follow-up with orthopedist.    Return in about 3 months (around 8/5/2022). Electronically signed by David Newell.  Katelynn River, DO on 5/5/2022

## 2022-05-06 ENCOUNTER — TELEPHONE (OUTPATIENT)
Dept: FAMILY MEDICINE CLINIC | Age: 77
End: 2022-05-06

## 2022-05-06 ENCOUNTER — HOSPITAL ENCOUNTER (INPATIENT)
Age: 77
LOS: 4 days | Discharge: HOME | DRG: 640 | End: 2022-05-10
Attending: EMERGENCY MEDICINE | Admitting: INTERNAL MEDICINE
Payer: MEDICARE

## 2022-05-06 DIAGNOSIS — E87.6 HYPOKALEMIA: ICD-10-CM

## 2022-05-06 DIAGNOSIS — J44.1 COPD EXACERBATION (HCC): Primary | ICD-10-CM

## 2022-05-06 LAB
ANION GAP SERPL CALCULATED.3IONS-SCNC: 15 MMOL/L (ref 7–16)
BASOPHILS ABSOLUTE: 0.06 E9/L (ref 0–0.2)
BASOPHILS RELATIVE PERCENT: 0.4 % (ref 0–2)
BUN BLDV-MCNC: 10 MG/DL (ref 6–23)
CALCIUM SERPL-MCNC: 10 MG/DL (ref 8.6–10.2)
CHLORIDE BLD-SCNC: 88 MMOL/L (ref 98–107)
CO2: 35 MMOL/L (ref 22–29)
CREAT SERPL-MCNC: 0.6 MG/DL (ref 0.5–1)
EOSINOPHILS ABSOLUTE: 0.07 E9/L (ref 0.05–0.5)
EOSINOPHILS RELATIVE PERCENT: 0.5 % (ref 0–6)
GFR AFRICAN AMERICAN: >60
GFR NON-AFRICAN AMERICAN: >60 ML/MIN/1.73
GLUCOSE BLD-MCNC: 135 MG/DL (ref 74–99)
HCT VFR BLD CALC: 49.6 % (ref 34–48)
HEMOGLOBIN: 17 G/DL (ref 11.5–15.5)
IMMATURE GRANULOCYTES #: 0.07 E9/L
IMMATURE GRANULOCYTES %: 0.5 % (ref 0–5)
LYMPHOCYTES ABSOLUTE: 4.05 E9/L (ref 1.5–4)
LYMPHOCYTES RELATIVE PERCENT: 29.2 % (ref 20–42)
MAGNESIUM: 1.7 MG/DL (ref 1.6–2.6)
MAGNESIUM: 2.7 MG/DL (ref 1.6–2.6)
MCH RBC QN AUTO: 31 PG (ref 26–35)
MCHC RBC AUTO-ENTMCNC: 34.3 % (ref 32–34.5)
MCV RBC AUTO: 90.5 FL (ref 80–99.9)
MONOCYTES ABSOLUTE: 1.1 E9/L (ref 0.1–0.95)
MONOCYTES RELATIVE PERCENT: 7.9 % (ref 2–12)
NEUTROPHILS ABSOLUTE: 8.52 E9/L (ref 1.8–7.3)
NEUTROPHILS RELATIVE PERCENT: 61.5 % (ref 43–80)
PDW BLD-RTO: 13.9 FL (ref 11.5–15)
PLATELET # BLD: 523 E9/L (ref 130–450)
PMV BLD AUTO: 9.5 FL (ref 7–12)
POTASSIUM SERPL-SCNC: 2.2 MMOL/L (ref 3.5–5)
RBC # BLD: 5.48 E12/L (ref 3.5–5.5)
SODIUM BLD-SCNC: 138 MMOL/L (ref 132–146)
WBC # BLD: 13.9 E9/L (ref 4.5–11.5)

## 2022-05-06 PROCEDURE — 6360000002 HC RX W HCPCS: Performed by: EMERGENCY MEDICINE

## 2022-05-06 PROCEDURE — 80048 BASIC METABOLIC PNL TOTAL CA: CPT

## 2022-05-06 PROCEDURE — 6370000000 HC RX 637 (ALT 250 FOR IP): Performed by: EMERGENCY MEDICINE

## 2022-05-06 PROCEDURE — 96365 THER/PROPH/DIAG IV INF INIT: CPT

## 2022-05-06 PROCEDURE — 85025 COMPLETE CBC W/AUTO DIFF WBC: CPT

## 2022-05-06 PROCEDURE — 94640 AIRWAY INHALATION TREATMENT: CPT

## 2022-05-06 PROCEDURE — 99285 EMERGENCY DEPT VISIT HI MDM: CPT

## 2022-05-06 PROCEDURE — 36415 COLL VENOUS BLD VENIPUNCTURE: CPT

## 2022-05-06 PROCEDURE — 6370000000 HC RX 637 (ALT 250 FOR IP): Performed by: INTERNAL MEDICINE

## 2022-05-06 PROCEDURE — 6360000002 HC RX W HCPCS: Performed by: INTERNAL MEDICINE

## 2022-05-06 PROCEDURE — 83735 ASSAY OF MAGNESIUM: CPT

## 2022-05-06 PROCEDURE — 94664 DEMO&/EVAL PT USE INHALER: CPT

## 2022-05-06 PROCEDURE — 1200000000 HC SEMI PRIVATE

## 2022-05-06 RX ORDER — ENOXAPARIN SODIUM 100 MG/ML
30 INJECTION SUBCUTANEOUS DAILY
Status: DISCONTINUED | OUTPATIENT
Start: 2022-05-06 | End: 2022-05-10 | Stop reason: HOSPADM

## 2022-05-06 RX ORDER — BUDESONIDE 0.5 MG/2ML
500 INHALANT ORAL 2 TIMES DAILY
Status: DISCONTINUED | OUTPATIENT
Start: 2022-05-06 | End: 2022-05-10

## 2022-05-06 RX ORDER — POTASSIUM CHLORIDE 20 MEQ/1
20 TABLET, EXTENDED RELEASE ORAL
Status: DISCONTINUED | OUTPATIENT
Start: 2022-05-06 | End: 2022-05-07

## 2022-05-06 RX ORDER — ENOXAPARIN SODIUM 100 MG/ML
40 INJECTION SUBCUTANEOUS DAILY
Status: DISCONTINUED | OUTPATIENT
Start: 2022-05-06 | End: 2022-05-06 | Stop reason: DRUGHIGH

## 2022-05-06 RX ORDER — LISINOPRIL 5 MG/1
5 TABLET ORAL DAILY
Status: DISCONTINUED | OUTPATIENT
Start: 2022-05-06 | End: 2022-05-10 | Stop reason: HOSPADM

## 2022-05-06 RX ORDER — ATORVASTATIN CALCIUM 20 MG/1
20 TABLET, FILM COATED ORAL DAILY
Status: DISCONTINUED | OUTPATIENT
Start: 2022-05-06 | End: 2022-05-10 | Stop reason: HOSPADM

## 2022-05-06 RX ORDER — LEVOTHYROXINE SODIUM 0.07 MG/1
75 TABLET ORAL DAILY
Status: DISCONTINUED | OUTPATIENT
Start: 2022-05-06 | End: 2022-05-10 | Stop reason: HOSPADM

## 2022-05-06 RX ORDER — IBUPROFEN 400 MG/1
400 TABLET ORAL EVERY 6 HOURS PRN
Status: DISCONTINUED | OUTPATIENT
Start: 2022-05-06 | End: 2022-05-10 | Stop reason: HOSPADM

## 2022-05-06 RX ORDER — POTASSIUM BICARBONATE 25 MEQ/1
50 TABLET, EFFERVESCENT ORAL ONCE
Status: DISCONTINUED | OUTPATIENT
Start: 2022-05-06 | End: 2022-05-06 | Stop reason: CLARIF

## 2022-05-06 RX ORDER — POTASSIUM CHLORIDE 7.45 MG/ML
10 INJECTION INTRAVENOUS
Status: COMPLETED | OUTPATIENT
Start: 2022-05-06 | End: 2022-05-06

## 2022-05-06 RX ORDER — ACETAMINOPHEN 500 MG
500 TABLET ORAL EVERY 6 HOURS PRN
Status: DISCONTINUED | OUTPATIENT
Start: 2022-05-06 | End: 2022-05-10 | Stop reason: HOSPADM

## 2022-05-06 RX ORDER — IPRATROPIUM BROMIDE AND ALBUTEROL SULFATE 2.5; .5 MG/3ML; MG/3ML
1 SOLUTION RESPIRATORY (INHALATION)
Status: DISCONTINUED | OUTPATIENT
Start: 2022-05-06 | End: 2022-05-10 | Stop reason: HOSPADM

## 2022-05-06 RX ORDER — POLYETHYLENE GLYCOL 3350 17 G/17G
17 POWDER, FOR SOLUTION ORAL DAILY PRN
Status: DISCONTINUED | OUTPATIENT
Start: 2022-05-06 | End: 2022-05-10 | Stop reason: HOSPADM

## 2022-05-06 RX ORDER — IPRATROPIUM BROMIDE AND ALBUTEROL SULFATE 2.5; .5 MG/3ML; MG/3ML
1 SOLUTION RESPIRATORY (INHALATION) ONCE
Status: COMPLETED | OUTPATIENT
Start: 2022-05-06 | End: 2022-05-06

## 2022-05-06 RX ORDER — PROCHLORPERAZINE EDISYLATE 5 MG/ML
5 INJECTION INTRAMUSCULAR; INTRAVENOUS EVERY 6 HOURS PRN
Status: DISCONTINUED | OUTPATIENT
Start: 2022-05-06 | End: 2022-05-10 | Stop reason: HOSPADM

## 2022-05-06 RX ORDER — POTASSIUM CHLORIDE 7.45 MG/ML
10 INJECTION INTRAVENOUS ONCE
Status: COMPLETED | OUTPATIENT
Start: 2022-05-06 | End: 2022-05-06

## 2022-05-06 RX ORDER — MAGNESIUM SULFATE IN WATER 40 MG/ML
2000 INJECTION, SOLUTION INTRAVENOUS ONCE
Status: COMPLETED | OUTPATIENT
Start: 2022-05-06 | End: 2022-05-06

## 2022-05-06 RX ORDER — PANTOPRAZOLE SODIUM 40 MG/1
40 TABLET, DELAYED RELEASE ORAL
Status: DISCONTINUED | OUTPATIENT
Start: 2022-05-07 | End: 2022-05-10 | Stop reason: HOSPADM

## 2022-05-06 RX ADMIN — POTASSIUM CHLORIDE 20 MEQ: 20 TABLET, EXTENDED RELEASE ORAL at 13:36

## 2022-05-06 RX ADMIN — POTASSIUM CHLORIDE 10 MEQ: 7.46 INJECTION, SOLUTION INTRAVENOUS at 11:10

## 2022-05-06 RX ADMIN — IPRATROPIUM BROMIDE AND ALBUTEROL SULFATE 1 AMPULE: .5; 2.5 SOLUTION RESPIRATORY (INHALATION) at 10:07

## 2022-05-06 RX ADMIN — IPRATROPIUM BROMIDE AND ALBUTEROL SULFATE 1 AMPULE: .5; 2.5 SOLUTION RESPIRATORY (INHALATION) at 14:40

## 2022-05-06 RX ADMIN — LISINOPRIL 5 MG: 5 TABLET ORAL at 13:05

## 2022-05-06 RX ADMIN — POTASSIUM BICARBONATE 40 MEQ: 782 TABLET, EFFERVESCENT ORAL at 11:10

## 2022-05-06 RX ADMIN — POTASSIUM CHLORIDE 10 MEQ: 7.46 INJECTION, SOLUTION INTRAVENOUS at 14:44

## 2022-05-06 RX ADMIN — ENOXAPARIN SODIUM 30 MG: 100 INJECTION SUBCUTANEOUS at 13:39

## 2022-05-06 RX ADMIN — IBUPROFEN 400 MG: 400 TABLET, FILM COATED ORAL at 22:20

## 2022-05-06 RX ADMIN — MAGNESIUM SULFATE HEPTAHYDRATE 2000 MG: 40 INJECTION, SOLUTION INTRAVENOUS at 10:03

## 2022-05-06 RX ADMIN — IBUPROFEN 400 MG: 400 TABLET, FILM COATED ORAL at 14:46

## 2022-05-06 RX ADMIN — POTASSIUM CHLORIDE 10 MEQ: 7.46 INJECTION, SOLUTION INTRAVENOUS at 13:08

## 2022-05-06 ASSESSMENT — PAIN SCALES - GENERAL
PAINLEVEL_OUTOF10: 7
PAINLEVEL_OUTOF10: 9
PAINLEVEL_OUTOF10: 5
PAINLEVEL_OUTOF10: 8

## 2022-05-06 ASSESSMENT — ENCOUNTER SYMPTOMS
EYE REDNESS: 0
NAUSEA: 0
VOMITING: 0
EYE DISCHARGE: 0
COUGH: 0
EYE PAIN: 0
SHORTNESS OF BREATH: 0
ORTHOPNEA: 0
SORE THROAT: 0
DIARRHEA: 0
ABDOMINAL DISTENTION: 0
ABDOMINAL PAIN: 0
SINUS PRESSURE: 0
BACK PAIN: 0
CONSTIPATION: 0

## 2022-05-06 ASSESSMENT — PAIN DESCRIPTION - DESCRIPTORS
DESCRIPTORS: ACHING

## 2022-05-06 ASSESSMENT — PAIN DESCRIPTION - PAIN TYPE
TYPE: CHRONIC PAIN
TYPE: CHRONIC PAIN

## 2022-05-06 ASSESSMENT — PAIN DESCRIPTION - FREQUENCY: FREQUENCY: CONTINUOUS

## 2022-05-06 ASSESSMENT — PAIN - FUNCTIONAL ASSESSMENT
PAIN_FUNCTIONAL_ASSESSMENT: NONE - DENIES PAIN
PAIN_FUNCTIONAL_ASSESSMENT: 0-10

## 2022-05-06 ASSESSMENT — PAIN DESCRIPTION - LOCATION
LOCATION: SHOULDER

## 2022-05-06 ASSESSMENT — PAIN DESCRIPTION - ORIENTATION
ORIENTATION: RIGHT;LEFT
ORIENTATION: LEFT;RIGHT
ORIENTATION: LEFT;RIGHT

## 2022-05-06 NOTE — ED PROVIDER NOTES
Patient states she had outpatient lab work done yesterday. She received a call today that her potassium was 2.2 and she should go to the hospital.  She does have history of COPD. She continues to smoke. No complaints of shortness of breath or other complaints today. The history is provided by the patient. Illness   The current episode started today. The onset is undetermined. The problem is mild. Nothing relieves the symptoms. Nothing aggravates the symptoms. Pertinent negatives include no orthopnea, no fever, no abdominal pain, no constipation, no diarrhea, no nausea, no vomiting, no headaches, no sore throat, no cough, no rash, no eye discharge, no eye pain and no eye redness. Review of Systems   Constitutional: Negative for chills and fever. HENT: Negative for sinus pressure and sore throat. Eyes: Negative for pain, discharge and redness. Respiratory: Negative for cough and shortness of breath. Cardiovascular: Negative for chest pain and orthopnea. Gastrointestinal: Negative for abdominal distention, abdominal pain, constipation, diarrhea, nausea and vomiting. Genitourinary: Negative for dysuria and frequency. Musculoskeletal: Negative for arthralgias and back pain. Skin: Negative for rash and wound. Neurological: Negative for weakness and headaches. Hematological: Negative for adenopathy. All other systems reviewed and are negative. Physical Exam  Vitals and nursing note reviewed. Constitutional:       Appearance: She is well-developed and underweight. HENT:      Head: Normocephalic and atraumatic. Eyes:      Pupils: Pupils are equal, round, and reactive to light. Cardiovascular:      Rate and Rhythm: Normal rate and regular rhythm. Heart sounds: Normal heart sounds. No murmur heard. Pulmonary:      Effort: Pulmonary effort is normal. No respiratory distress. Breath sounds: Wheezing present. No rales.       Comments: Mild, diffuse expiratory wheeze in the setting of decreased breath sounds overall. Abdominal:      General: Bowel sounds are normal.      Palpations: Abdomen is soft. Tenderness: There is no abdominal tenderness. There is no guarding or rebound. Musculoskeletal:      Cervical back: Normal range of motion and neck supple. Skin:     General: Skin is warm and dry. Neurological:      Mental Status: She is alert and oriented to person, place, and time. Cranial Nerves: No cranial nerve deficit. Coordination: Coordination normal.          Procedures     MDM     --------------------------------------------- PAST HISTORY ---------------------------------------------  Past Medical History:  has a past medical history of Bronchitis, Depression, GERD (gastroesophageal reflux disease), Hyperlipidemia, Hypertension, Irritable bowel, Kidney stone, Migraines, and Thyroid disease. Past Surgical History:  has a past surgical history that includes Nerve Block (Left, 09/07/2016); Nerve Block (Left, 09/19/2016); Splenectomy (age 23); Hysterectomy; Nose surgery; back surgery (2011); Tonsillectomy; Appendectomy; other surgical history (Left, 11/07/2016); other surgical history (Left, 08/07/2017); and Spine surgery. Social History:  reports that she has been smoking cigarettes. She has a 25.00 pack-year smoking history. She has never used smokeless tobacco. She reports that she does not drink alcohol and does not use drugs. Family History: family history includes Arthritis in an other family member; Heart Disease in an other family member; High Blood Pressure in an other family member. The patients home medications have been reviewed. Allergies:  Iodides, Peanuts [peanut oil], and Penicillins    -------------------------------------------------- RESULTS -------------------------------------------------    LABS:  Results for orders placed or performed during the hospital encounter of 05/06/22   CBC with Auto Differential Result Value Ref Range    WBC 13.9 (H) 4.5 - 11.5 E9/L    RBC 5.48 3.50 - 5.50 E12/L    Hemoglobin 17.0 (H) 11.5 - 15.5 g/dL    Hematocrit 49.6 (H) 34.0 - 48.0 %    MCV 90.5 80.0 - 99.9 fL    MCH 31.0 26.0 - 35.0 pg    MCHC 34.3 32.0 - 34.5 %    RDW 13.9 11.5 - 15.0 fL    Platelets 637 (H) 871 - 450 E9/L    MPV 9.5 7.0 - 12.0 fL    Neutrophils % 61.5 43.0 - 80.0 %    Immature Granulocytes % 0.5 0.0 - 5.0 %    Lymphocytes % 29.2 20.0 - 42.0 %    Monocytes % 7.9 2.0 - 12.0 %    Eosinophils % 0.5 0.0 - 6.0 %    Basophils % 0.4 0.0 - 2.0 %    Neutrophils Absolute 8.52 (H) 1.80 - 7.30 E9/L    Immature Granulocytes # 0.07 E9/L    Lymphocytes Absolute 4.05 (H) 1.50 - 4.00 E9/L    Monocytes Absolute 1.10 (H) 0.10 - 0.95 E9/L    Eosinophils Absolute 0.07 0.05 - 0.50 E9/L    Basophils Absolute 0.06 0.00 - 0.20 T2/A   Basic Metabolic Panel   Result Value Ref Range    Sodium 138 132 - 146 mmol/L    Potassium 2.2 (LL) 3.5 - 5.0 mmol/L    Chloride 88 (L) 98 - 107 mmol/L    CO2 35 (H) 22 - 29 mmol/L    Anion Gap 15 7 - 16 mmol/L    Glucose 135 (H) 74 - 99 mg/dL    BUN 10 6 - 23 mg/dL    CREATININE 0.6 0.5 - 1.0 mg/dL    GFR Non-African American >60 >=60 mL/min/1.73    GFR African American >60     Calcium 10.0 8.6 - 10.2 mg/dL   Magnesium   Result Value Ref Range    Magnesium 1.7 1.6 - 2.6 mg/dL       RADIOLOGY:  No orders to display         ------------------------- NURSING NOTES AND VITALS REVIEWED ---------------------------  Date / Time Roomed:  5/6/2022  9:22 AM  ED Bed Assignment:  14/14    The nursing notes within the ED encounter and vital signs as below have been reviewed.      Patient Vitals for the past 24 hrs:   BP Temp Temp src Pulse Resp SpO2 Height Weight   05/06/22 1114 (!) 135/106 -- -- 88 16 91 % -- --   05/06/22 0927 110/77 -- -- 109 -- 90 % -- --   05/06/22 0919 117/65 97.2 °F (36.2 °C) Infrared 97 18 91 % 4' 9\" (1.448 m) 105 lb (47.6 kg)       Oxygen Saturation Interpretation: Abnormal    ------------------------------------------ PROGRESS NOTES ------------------------------------------  Re-evaluation(s):  Time: 1110  Patients symptoms show no change  Repeat physical examination is not changed    Counseling:  I have spoken with the patient and discussed todays results, in addition to providing specific details for the plan of care and counseling regarding the diagnosis and prognosis. Their questions are answered at this time and they are agreeable with the plan of admission.    --------------------------------- ADDITIONAL PROVIDER NOTES ---------------------------------  Consultations:  Time: 1110. Spoke with Dr. Joselyn Nelson. Discussed case. They will admit the patient. This patient's ED course included: a personal history and physicial examination, multiple bedside re-evaluations and IV medications    This patient has remained hemodynamically stable during their ED course. Diagnosis:  1. COPD exacerbation ([address]    2. Hypokalemia        Disposition:  Patient's disposition: Admit to telemetry  Patient's condition is stable.          Johanna Tai DO  05/06/22 1110

## 2022-05-06 NOTE — H&P
Department of Internal Medicine        CHIEF COMPLAINT: Outpatient lab work showing hypokalemia    Reason for Admission: Severe hypokalemia, COPD exacerbation    HISTORY OF PRESENT ILLNESS:      The patient is a 78 y.o. female who presents with having outpatient lab work performed having potassium 2.2. This was repeated and was 2.2 again today. Serum magnesium 1.7. Patient does complain of 3 watery bowel movements every day. Patient denies any chest or abdominal pain. She denies any dizziness. She denies shortness of breath at rest.  BUN/creatinine was 10/0.6. CO2 is 35. O2 saturation was only 91% on room air at rest.  She does complain of some exertional dyspnea. Patient does have some underlying expiratory wheezing. Temperature 97.2 with heart rate 88 and blood pressure 135/106. Past Medical History:    Past Medical History:   Diagnosis Date    Bronchitis     Depression     GERD (gastroesophageal reflux disease)     Hyperlipidemia     Hypertension     Irritable bowel     Kidney stone     Migraines     Thyroid disease      Past Surgical History:    Past Surgical History:   Procedure Laterality Date    APPENDECTOMY      BACK SURGERY  2011    cervical disc surgery    HYSTERECTOMY      NERVE BLOCK Left 09/07/2016    sacroiliac joint #1    NERVE BLOCK Left 09/19/2016    si inj #2    NOSE SURGERY      bone removed d/t breathing    OTHER SURGICAL HISTORY Left 11/07/2016    sacroiliac joint radiofrequency    OTHER SURGICAL HISTORY Left 08/07/2017    radiofrequency, sacroiliac    SPINE SURGERY      SPLENECTOMY  age 23    d/t a tear etiology unknown    TONSILLECTOMY         Medications Prior to Admission:    @  Prior to Admission medications    Medication Sig Start Date End Date Taking?  Authorizing Provider   lisinopril-hydroCHLOROthiazide (PRINZIDE;ZESTORETIC) 20-25 MG per tablet Take 1 tablet by mouth daily 5/5/22   Kofi Galdamez DO   rizatriptan (MAXALT) 10 MG tablet Take 1 tablet by mouth once as needed for Migraine May repeat in 2 hours if needed 5/5/22 5/5/22  Kitty Poe, DO   atorvastatin (LIPITOR) 20 MG tablet Take 1 tablet by mouth daily 5/5/22   Kitty Poe, DO   omeprazole (PRILOSEC) 20 MG delayed release capsule Take 1 capsule by mouth daily 5/5/22   Kitty Poe, DO   levothyroxine (SYNTHROID) 75 MCG tablet Take 1 tablet by mouth Daily 5/5/22   Kitty Poe, DO   ibuprofen (ADVIL;MOTRIN) 400 MG tablet Take 1 tablet by mouth every 6 hours as needed for Pain 5/5/22   Kitty Poe, DO       Allergies: Iodides, Peanuts [peanut oil], and Penicillins    Social History:   Social History     Socioeconomic History    Marital status:      Spouse name: Not on file    Number of children: Not on file    Years of education: Not on file    Highest education level: Not on file   Occupational History    Not on file   Tobacco Use    Smoking status: Current Every Day Smoker     Packs/day: 0.50     Years: 50.00     Pack years: 25.00     Types: Cigarettes    Smokeless tobacco: Never Used   Substance and Sexual Activity    Alcohol use: No    Drug use: No    Sexual activity: Not Currently   Other Topics Concern    Not on file   Social History Narrative    Not on file     Social Determinants of Health     Financial Resource Strain: High Risk    Difficulty of Paying Living Expenses: Hard   Food Insecurity: Food Insecurity Present    Worried About Running Out of Food in the Last Year: Often true    Luiz of Food in the Last Year: Sometimes true   Transportation Needs:     Lack of Transportation (Medical): Not on file    Lack of Transportation (Non-Medical):  Not on file   Physical Activity:     Days of Exercise per Week: Not on file    Minutes of Exercise per Session: Not on file   Stress:     Feeling of Stress : Not on file   Social Connections:     Frequency of Communication with Friends and Family: Not on file    Frequency of Social Gatherings with Friends and Family: Not on file    Attends Scientologist Services: Not on file    Active Member of Clubs or Organizations: Not on file    Attends Club or Organization Meetings: Not on file    Marital Status: Not on file   Intimate Partner Violence:     Fear of Current or Ex-Partner: Not on file    Emotionally Abused: Not on file    Physically Abused: Not on file    Sexually Abused: Not on file   Housing Stability:     Unable to Pay for Housing in the Last Year: Not on file    Number of [place] in the Last Year: Not on file    Unstable Housing in the Last Year: Not on file       Family History:   Family History   Problem Relation Age of Onset    Arthritis Other     Heart Disease Other     High Blood Pressure Other        REVIEW OF SYSTEMS:    Gen: Patient denies any lightheadedness or dizziness. No LOC or syncope. No fevers or chills. HEENT: No earache, sore throat or nasal congestion. Resp: Denies cough, hemoptysis or sputum production. Cardiac: Denies chest pain,+ SOB, diaphoresis or palpitations. GI: No nausea, vomiting, diarrhea or constipation. No melena or hematochezia. : No urinary complaints, dysuria, hematuria or frequency. MSK: No extremity weakness, paralysis or paresthesias. PHYSICAL EXAM:    Vitals:  BP (!) 135/106   Pulse 88   Temp 97.2 °F (36.2 °C) (Infrared)   Resp 16   Ht 4' 9\" (1.448 m)   Wt 105 lb (47.6 kg)   SpO2 91%   BMI 22.72 kg/m²     General:  This is a 78 y.o. yo female who is alert and oriented in NAD  HEENT:  Head is normocephalic and atraumatic, PERRLA, EOMI, mucus membranes moist with no pharyngeal erythema or exudate. Neck:  Supple with no carotid bruits, JVD or thyromegaly.   No cervical adenopathy  CV:  Regular rate and rhythm, no murmurs  Lungs: Coarse decreased breath sounds to auscultation bilaterally with mild right-sided expiratory wheezes, no rales or rhonchi  Abdomen:  Soft, nontender, nondistended, bowel sounds present  Extremities:  No edema, peripheral pulses intact bilaterally  Neuro:  Cranial nerves II-XII grossly intact; motor and sensory function intact with no focal deficits  Skin:  No rashes, lesions or wounds    DATA:  CBC with Differential:    Lab Results   Component Value Date    WBC 13.9 05/06/2022    RBC 5.48 05/06/2022    HGB 17.0 05/06/2022    HCT 49.6 05/06/2022     05/06/2022    MCV 90.5 05/06/2022    MCH 31.0 05/06/2022    MCHC 34.3 05/06/2022    RDW 13.9 05/06/2022    LYMPHOPCT 29.2 05/06/2022    MONOPCT 7.9 05/06/2022    BASOPCT 0.4 05/06/2022    MONOSABS 1.10 05/06/2022    LYMPHSABS 4.05 05/06/2022    EOSABS 0.07 05/06/2022    BASOSABS 0.06 05/06/2022     CMP:    Lab Results   Component Value Date     05/06/2022    K 2.2 05/06/2022    CL 88 05/06/2022    CO2 35 05/06/2022    BUN 10 05/06/2022    CREATININE 0.6 05/06/2022    GFRAA >60 05/06/2022    LABGLOM >60 05/06/2022    GLUCOSE 135 05/06/2022    PROT 7.6 05/05/2022    LABALBU 4.2 05/05/2022    CALCIUM 10.0 05/06/2022    BILITOT 0.4 05/05/2022    ALKPHOS 117 05/05/2022    AST 29 05/05/2022    ALT 22 05/05/2022     Magnesium:    Lab Results   Component Value Date    MG 1.7 05/06/2022     Phosphorus:  No results found for: PHOS  PT/INR:  No results found for: PROTIME, INR  Troponin:    Lab Results   Component Value Date    TROPONINI <0.01 04/01/2018     U/A:    Lab Results   Component Value Date    COLORU Yellow 04/01/2018    PROTEINU TRACE 04/01/2018    PHUR 8.0 04/01/2018    WBCUA 0-1 04/01/2018    RBCUA 2-5 04/01/2018    BACTERIA NONE 04/01/2018    CLARITYU Clear 04/01/2018    SPECGRAV 1.010 04/01/2018    LEUKOCYTESUR Negative 04/01/2018    UROBILINOGEN 0.2 04/01/2018    BILIRUBINUR Negative 04/01/2018    BLOODU MODERATE 04/01/2018    GLUCOSEU Negative 04/01/2018     ABG:  No results found for: PH, PCO2, PO2, HCO3, BE, THGB, TCO2, O2SAT  HgBA1c:  No results found for: LABA1C  FLP:    Lab Results   Component Value Date    TRIG 241 05/05/2022    HDL 53 05/05/2022    LDLCALC 91 05/05/2022    LABVLDL 48 05/05/2022     TSH:    Lab Results   Component Value Date    TSH 1.540 05/05/2022     IRON:  No results found for: IRON  LIPASE:    Lab Results   Component Value Date    LIPASE 14 04/01/2018       ASSESSMENT AND PLAN:      Patient Active Problem List    Diagnosis Date Noted    Sacroiliitis  08/08/2016    COPD exacerbation ([facility] [address] 05/06/2022    Acquired hypothyroidism 05/05/2022    Essential hypertension 05/05/2022    Chronic pain of both shoulders 05/05/2022    GERD without esophagitis 05/05/2022    Acute respiratory failure with hypoxia ([facility] [address] 04/01/2018    DDD (degenerative disc disease), cervical 08/08/2016    Status post cervical spinal fusion 08/08/2016    Cervical osteoarthritis 08/08/2016    DDD (degenerative disc disease), lumbar 08/08/2016    Facet syndrome, lumbar 08/08/2016     Impression:  1. Severe hypokalemia  2. COPD with exacerbation with current tobacco abuse  3. History hypertension  4. History hyperlipidemia  5. History hypothyroidism  6. History of depression  7. Chronic diarrhea-3 bowel movements a day in the a.m. Plan:  Admit to monitored bed  Home medication reviewed  Activity up ad valentina. General diet  Lovenox 40 mg subcu daily  KCl 20 mill colons p.o. 4 times daily  Serum magnesium in a.m. Mag oxide 400 mg twice daily  Hold hydrochlorothiazide  DuoNeb aerosols 4 times daily  Pulmicort aerosol twice daily  O2 saturation on room air at rest and if greater than 89% with activity  O2 nasal cannula as needed    BMP, magnesium in a.m.     Oma Wood DO, D.OHaily  5/6/2022

## 2022-05-06 NOTE — PLAN OF CARE
Problem: Safety - Adult  Goal: Free from fall injury  5/6/2022 1710 by Umu Garcia RN  Outcome: Progressing

## 2022-05-06 NOTE — TELEPHONE ENCOUNTER
I called patient yesterday about 9 PM and left message.   Will contact patient for follow-up next week

## 2022-05-06 NOTE — TELEPHONE ENCOUNTER
Attempted to reach patient at 07:48 AM to instruct her to go to the ED due to having critically low potassium level. Patient did not answer. Left message instructing her to go to ED. Will attempt to reach patient again in 30 minutes from first call.

## 2022-05-06 NOTE — TELEPHONE ENCOUNTER
Was finally able to speak with patient. Instructed her to go to [address] [place] [address],[address] to be evaluated. She asked when she needed to go. I told her as soon as she can get there and stressed how important it is for her to go. She said she will talk to her  about getting her to ED.

## 2022-05-06 NOTE — PROGRESS NOTES
Pharmacist Review and Automatic Dose Adjustment of Prophylactic Enoxaparin    *Review reason for admission/hospital problem list*    The reviewing pharmacist has made an adjustment to the ordered enoxaparin dose or converted to UFH per the approved [facility] protocol and table as identified below. Wilder Carvajal is a 78 y.o. female. Recent Labs     05/05/22  1415 05/06/22  0945   CREATININE 0.6 0.6       Estimated Creatinine Clearance: 58 mL/min (based on SCr of 0.6 mg/dL). Recent Labs     05/06/22  0945   HGB 17.0*   HCT 49.6*   *     No results for input(s): INR in the last 72 hours. Height:   Ht Readings from Last 1 Encounters:   05/06/22 4' 9\" (1.448 m)     Weight:  Wt Readings from Last 1 Encounters:   05/06/22 105 lb (47.6 kg)               Plan: Based upon the patient's weight and renal function, the ordered enoxaparin dose of 40 mg daily has been changed/converted to 30 mg daily.       Thank you,  Kandi Serrano, [facility]  5/6/2022, 1:22 PM

## 2022-05-07 ENCOUNTER — APPOINTMENT (OUTPATIENT)
Dept: CT IMAGING | Age: 77
DRG: 640 | End: 2022-05-07
Payer: MEDICARE

## 2022-05-07 LAB
ALBUMIN SERPL-MCNC: 3.8 G/DL (ref 3.5–5.2)
ALP BLD-CCNC: 104 U/L (ref 35–104)
ALT SERPL-CCNC: 21 U/L (ref 0–32)
ANION GAP SERPL CALCULATED.3IONS-SCNC: 12 MMOL/L (ref 7–16)
ANION GAP SERPL CALCULATED.3IONS-SCNC: 13 MMOL/L (ref 7–16)
AST SERPL-CCNC: 28 U/L (ref 0–31)
BASOPHILS ABSOLUTE: 0.08 E9/L (ref 0–0.2)
BASOPHILS RELATIVE PERCENT: 0.6 % (ref 0–2)
BILIRUB SERPL-MCNC: 0.7 MG/DL (ref 0–1.2)
BUN BLDV-MCNC: 8 MG/DL (ref 6–23)
BUN BLDV-MCNC: 9 MG/DL (ref 6–23)
CALCIUM SERPL-MCNC: 8.8 MG/DL (ref 8.6–10.2)
CALCIUM SERPL-MCNC: 9.5 MG/DL (ref 8.6–10.2)
CHLORIDE BLD-SCNC: 93 MMOL/L (ref 98–107)
CHLORIDE BLD-SCNC: 93 MMOL/L (ref 98–107)
CHOLESTEROL, TOTAL: 174 MG/DL (ref 0–199)
CO2: 27 MMOL/L (ref 22–29)
CO2: 33 MMOL/L (ref 22–29)
CREAT SERPL-MCNC: 0.5 MG/DL (ref 0.5–1)
CREAT SERPL-MCNC: 0.5 MG/DL (ref 0.5–1)
EOSINOPHILS ABSOLUTE: 0.19 E9/L (ref 0.05–0.5)
EOSINOPHILS RELATIVE PERCENT: 1.4 % (ref 0–6)
GFR AFRICAN AMERICAN: >60
GFR AFRICAN AMERICAN: >60
GFR NON-AFRICAN AMERICAN: >60 ML/MIN/1.73
GFR NON-AFRICAN AMERICAN: >60 ML/MIN/1.73
GLUCOSE BLD-MCNC: 184 MG/DL (ref 74–99)
GLUCOSE BLD-MCNC: 97 MG/DL (ref 74–99)
HCT VFR BLD CALC: 44.1 % (ref 34–48)
HDLC SERPL-MCNC: 58 MG/DL
HEMOGLOBIN: 15 G/DL (ref 11.5–15.5)
IMMATURE GRANULOCYTES #: 0.05 E9/L
IMMATURE GRANULOCYTES %: 0.4 % (ref 0–5)
LDL CHOLESTEROL CALCULATED: 89 MG/DL (ref 0–99)
LYMPHOCYTES ABSOLUTE: 5.41 E9/L (ref 1.5–4)
LYMPHOCYTES RELATIVE PERCENT: 39.8 % (ref 20–42)
MCH RBC QN AUTO: 30.4 PG (ref 26–35)
MCHC RBC AUTO-ENTMCNC: 34 % (ref 32–34.5)
MCV RBC AUTO: 89.5 FL (ref 80–99.9)
MONOCYTES ABSOLUTE: 1.14 E9/L (ref 0.1–0.95)
MONOCYTES RELATIVE PERCENT: 8.4 % (ref 2–12)
NEUTROPHILS ABSOLUTE: 6.74 E9/L (ref 1.8–7.3)
NEUTROPHILS RELATIVE PERCENT: 49.4 % (ref 43–80)
PDW BLD-RTO: 13.6 FL (ref 11.5–15)
PHOSPHORUS: 3.2 MG/DL (ref 2.5–4.5)
PLATELET # BLD: 491 E9/L (ref 130–450)
PMV BLD AUTO: 9.5 FL (ref 7–12)
POTASSIUM SERPL-SCNC: 2.6 MMOL/L (ref 3.5–5)
POTASSIUM SERPL-SCNC: 3.2 MMOL/L (ref 3.5–5)
RBC # BLD: 4.93 E12/L (ref 3.5–5.5)
SODIUM BLD-SCNC: 133 MMOL/L (ref 132–146)
SODIUM BLD-SCNC: 138 MMOL/L (ref 132–146)
TOTAL PROTEIN: 7.1 G/DL (ref 6.4–8.3)
TRIGL SERPL-MCNC: 133 MG/DL (ref 0–149)
TSH SERPL DL<=0.05 MIU/L-ACNC: 2.61 UIU/ML (ref 0.27–4.2)
VLDLC SERPL CALC-MCNC: 27 MG/DL
WBC # BLD: 13.6 E9/L (ref 4.5–11.5)

## 2022-05-07 PROCEDURE — 6370000000 HC RX 637 (ALT 250 FOR IP): Performed by: INTERNAL MEDICINE

## 2022-05-07 PROCEDURE — 80061 LIPID PANEL: CPT

## 2022-05-07 PROCEDURE — 36415 COLL VENOUS BLD VENIPUNCTURE: CPT

## 2022-05-07 PROCEDURE — 1200000000 HC SEMI PRIVATE

## 2022-05-07 PROCEDURE — 84443 ASSAY THYROID STIM HORMONE: CPT

## 2022-05-07 PROCEDURE — 6360000002 HC RX W HCPCS: Performed by: INTERNAL MEDICINE

## 2022-05-07 PROCEDURE — 94640 AIRWAY INHALATION TREATMENT: CPT

## 2022-05-07 PROCEDURE — 80053 COMPREHEN METABOLIC PANEL: CPT

## 2022-05-07 PROCEDURE — 2700000000 HC OXYGEN THERAPY PER DAY

## 2022-05-07 PROCEDURE — 71250 CT THORAX DX C-: CPT

## 2022-05-07 PROCEDURE — 80048 BASIC METABOLIC PNL TOTAL CA: CPT

## 2022-05-07 PROCEDURE — 84100 ASSAY OF PHOSPHORUS: CPT

## 2022-05-07 PROCEDURE — 85025 COMPLETE CBC W/AUTO DIFF WBC: CPT

## 2022-05-07 RX ORDER — POTASSIUM CHLORIDE AND SODIUM CHLORIDE 900; 300 MG/100ML; MG/100ML
INJECTION, SOLUTION INTRAVENOUS CONTINUOUS
Status: DISCONTINUED | OUTPATIENT
Start: 2022-05-07 | End: 2022-05-09

## 2022-05-07 RX ORDER — POTASSIUM CHLORIDE 20 MEQ/1
20 TABLET, EXTENDED RELEASE ORAL
Status: DISCONTINUED | OUTPATIENT
Start: 2022-05-07 | End: 2022-05-09

## 2022-05-07 RX ORDER — METHYLPREDNISOLONE SODIUM SUCCINATE 40 MG/ML
40 INJECTION, POWDER, LYOPHILIZED, FOR SOLUTION INTRAMUSCULAR; INTRAVENOUS EVERY 8 HOURS
Status: DISCONTINUED | OUTPATIENT
Start: 2022-05-07 | End: 2022-05-09

## 2022-05-07 RX ADMIN — ATORVASTATIN CALCIUM 20 MG: 20 TABLET, FILM COATED ORAL at 07:54

## 2022-05-07 RX ADMIN — IBUPROFEN 400 MG: 400 TABLET, FILM COATED ORAL at 05:45

## 2022-05-07 RX ADMIN — POTASSIUM CHLORIDE AND SODIUM CHLORIDE: 900; 300 INJECTION, SOLUTION INTRAVENOUS at 13:09

## 2022-05-07 RX ADMIN — IPRATROPIUM BROMIDE AND ALBUTEROL SULFATE 1 AMPULE: .5; 2.5 SOLUTION RESPIRATORY (INHALATION) at 09:48

## 2022-05-07 RX ADMIN — IPRATROPIUM BROMIDE AND ALBUTEROL SULFATE 1 AMPULE: .5; 2.5 SOLUTION RESPIRATORY (INHALATION) at 06:14

## 2022-05-07 RX ADMIN — POTASSIUM CHLORIDE AND SODIUM CHLORIDE: 900; 300 INJECTION, SOLUTION INTRAVENOUS at 23:13

## 2022-05-07 RX ADMIN — POTASSIUM CHLORIDE 20 MEQ: 20 TABLET, EXTENDED RELEASE ORAL at 20:23

## 2022-05-07 RX ADMIN — LEVOTHYROXINE SODIUM 75 MCG: 0.07 TABLET ORAL at 05:41

## 2022-05-07 RX ADMIN — BUDESONIDE 500 MCG: 0.5 INHALANT RESPIRATORY (INHALATION) at 06:16

## 2022-05-07 RX ADMIN — IBUPROFEN 400 MG: 400 TABLET, FILM COATED ORAL at 22:18

## 2022-05-07 RX ADMIN — POTASSIUM CHLORIDE 20 MEQ: 20 TABLET, EXTENDED RELEASE ORAL at 17:21

## 2022-05-07 RX ADMIN — LISINOPRIL 5 MG: 5 TABLET ORAL at 07:54

## 2022-05-07 RX ADMIN — ENOXAPARIN SODIUM 30 MG: 100 INJECTION SUBCUTANEOUS at 07:54

## 2022-05-07 RX ADMIN — METHYLPREDNISOLONE SODIUM SUCCINATE 40 MG: 40 INJECTION, POWDER, FOR SOLUTION INTRAMUSCULAR; INTRAVENOUS at 20:23

## 2022-05-07 RX ADMIN — POTASSIUM CHLORIDE 20 MEQ: 20 TABLET, EXTENDED RELEASE ORAL at 13:07

## 2022-05-07 RX ADMIN — PANTOPRAZOLE SODIUM 40 MG: 40 TABLET, DELAYED RELEASE ORAL at 05:39

## 2022-05-07 RX ADMIN — BUDESONIDE 500 MCG: 0.5 INHALANT RESPIRATORY (INHALATION) at 18:24

## 2022-05-07 RX ADMIN — METHYLPREDNISOLONE SODIUM SUCCINATE 40 MG: 40 INJECTION, POWDER, FOR SOLUTION INTRAMUSCULAR; INTRAVENOUS at 11:29

## 2022-05-07 RX ADMIN — POTASSIUM CHLORIDE 20 MEQ: 20 TABLET, EXTENDED RELEASE ORAL at 07:54

## 2022-05-07 RX ADMIN — POTASSIUM CHLORIDE AND SODIUM CHLORIDE: 900; 300 INJECTION, SOLUTION INTRAVENOUS at 11:03

## 2022-05-07 RX ADMIN — IPRATROPIUM BROMIDE AND ALBUTEROL SULFATE 1 AMPULE: .5; 2.5 SOLUTION RESPIRATORY (INHALATION) at 18:24

## 2022-05-07 RX ADMIN — IPRATROPIUM BROMIDE AND ALBUTEROL SULFATE 1 AMPULE: .5; 2.5 SOLUTION RESPIRATORY (INHALATION) at 14:13

## 2022-05-07 ASSESSMENT — PAIN DESCRIPTION - DESCRIPTORS
DESCRIPTORS: DULL;DISCOMFORT;ACHING
DESCRIPTORS: SHARP

## 2022-05-07 ASSESSMENT — PAIN DESCRIPTION - LOCATION
LOCATION: SHOULDER
LOCATION: HEAD
LOCATION: NECK;SHOULDER

## 2022-05-07 ASSESSMENT — PAIN DESCRIPTION - ORIENTATION
ORIENTATION: POSTERIOR
ORIENTATION: RIGHT;LEFT
ORIENTATION: MID

## 2022-05-07 ASSESSMENT — PAIN SCALES - GENERAL
PAINLEVEL_OUTOF10: 4
PAINLEVEL_OUTOF10: 9
PAINLEVEL_OUTOF10: 8

## 2022-05-07 NOTE — PLAN OF CARE
Problem: Safety - Adult  Goal: Free from fall injury  5/7/2022 0646 by Jeanna Stringer RN  Outcome: Progressing  5/6/2022 1710 by Heather Carranza RN  Outcome: Progressing     Problem: Pain  Goal: Verbalizes/displays adequate comfort level or baseline comfort level  Outcome: Progressing

## 2022-05-07 NOTE — PROGRESS NOTES
Department of Internal Medicine        CHIEF COMPLAINT: Outpatient lab work showing hypokalemia    Reason for Admission: Severe hypokalemia, COPD exacerbation    HISTORY OF PRESENT ILLNESS:      The patient is a 78 y.o. female who presents with having outpatient lab work performed having potassium 2.2. This was repeated and was 2.2 again today. Serum magnesium 1.7. Patient does complain of 3 watery bowel movements every day. Patient denies any chest or abdominal pain. She denies any dizziness. She denies shortness of breath at rest.  BUN/creatinine was 10/0.6. CO2 is 35. O2 saturation was only 91% on room air at rest.  She does complain of some exertional dyspnea. Patient does have some underlying expiratory wheezing. Temperature 97.2 with heart rate 88 and blood pressure 135/106.    5/7/2022  Patient seen examined on telemetry floor. Patient denies any new aches or pains. The patient denies any chest pain, abdominal pain, nausea vomiting or unusual shortness of breath. Patient though oxygen saturations have dropped down this morning with minimal activity. Potassium is only increased to 2.6 today with BUN/creatinine 9/0.5. Lipid panel and liver enzymes are normal.  WBC is 13.6 with hemoglobin 15. Temperature is 98.4 with heart rate in 90 and blood pressure 124/71.   O2 sat was 87%-93% on 1 L nasal cannula at rest.    Past Medical History:    Past Medical History:   Diagnosis Date    Bronchitis     Depression     GERD (gastroesophageal reflux disease)     Hyperlipidemia     Hypertension     Irritable bowel     Kidney stone     Migraines     Thyroid disease      Past Surgical History:    Past Surgical History:   Procedure Laterality Date    APPENDECTOMY      BACK SURGERY  2011    cervical disc surgery    HYSTERECTOMY      NERVE BLOCK Left 09/07/2016    sacroiliac joint #1    NERVE BLOCK Left 09/19/2016    si inj #2    NOSE SURGERY      bone removed d/t breathing    OTHER SURGICAL HISTORY Left 11/07/2016    sacroiliac joint radiofrequency    OTHER SURGICAL HISTORY Left 08/07/2017    radiofrequency, sacroiliac    SPINE SURGERY      SPLENECTOMY  age 23    d/t a tear etiology unknown    TONSILLECTOMY         Medications Prior to Admission:    @  Prior to Admission medications    Medication Sig Start Date End Date Taking? Authorizing Provider   lisinopril-hydroCHLOROthiazide (PRINZIDE;ZESTORETIC) 20-25 MG per tablet Take 1 tablet by mouth daily 5/5/22   Kofi Ripple, DO   rizatriptan (MAXALT) 10 MG tablet Take 1 tablet by mouth once as needed for Migraine May repeat in 2 hours if needed 5/5/22 5/5/22  Kofi Ripple, DO   atorvastatin (LIPITOR) 20 MG tablet Take 1 tablet by mouth daily 5/5/22   Kofi Ripple, DO   omeprazole (PRILOSEC) 20 MG delayed release capsule Take 1 capsule by mouth daily 5/5/22   Kofi Ripple, DO   levothyroxine (SYNTHROID) 75 MCG tablet Take 1 tablet by mouth Daily 5/5/22   Kofi Ripple, DO   ibuprofen (ADVIL;MOTRIN) 400 MG tablet Take 1 tablet by mouth every 6 hours as needed for Pain 5/5/22   Kofi Ripple, DO       Allergies:   Iodides, Peanuts [peanut oil], and Penicillins    Social History:   Social History     Socioeconomic History    Marital status:      Spouse name: Not on file    Number of children: Not on file    Years of education: Not on file    Highest education level: Not on file   Occupational History    Not on file   Tobacco Use    Smoking status: Current Every Day Smoker     Packs/day: 0.50     Years: 50.00     Pack years: 25.00     Types: Cigarettes    Smokeless tobacco: Never Used   Substance and Sexual Activity    Alcohol use: No    Drug use: No    Sexual activity: Not Currently   Other Topics Concern    Not on file   Social History Narrative    Not on file     Social Determinants of Health     Financial Resource Strain: High Risk    Difficulty of Paying Living Expenses: Hard   Food Insecurity: Food Insecurity Present  Worried About [facility] in the Last Year: Often true    Luiz of Food in the Last Year: Sometimes true   Transportation Needs:     Lack of Transportation (Medical): Not on file    Lack of Transportation (Non-Medical): Not on file   Physical Activity:     Days of Exercise per Week: Not on file    Minutes of Exercise per Session: Not on file   Stress:     Feeling of Stress : Not on file   Social Connections:     Frequency of Communication with Friends and Family: Not on file    Frequency of Social Gatherings with Friends and Family: Not on file    Attends Church Services: Not on file    Active Member of [address] [place] [address] or Organizations: Not on file    Attends Club or Organization Meetings: Not on file    Marital Status: Not on file   Intimate Partner Violence:     Fear of Current or Ex-Partner: Not on file    Emotionally Abused: Not on file    Physically Abused: Not on file    Sexually Abused: Not on file   Housing Stability:     Unable to Pay for Housing in the Last Year: Not on file    Number of [place] in the Last Year: Not on file    Unstable Housing in the Last Year: Not on file       Family History:   Family History   Problem Relation Age of Onset    Arthritis Other     Heart Disease Other     High Blood Pressure Other        REVIEW OF SYSTEMS:    Gen: Patient denies any lightheadedness or dizziness. No LOC or syncope. No fevers or chills. HEENT: No earache, sore throat or nasal congestion. Resp: Denies cough, hemoptysis or sputum production. Cardiac: Denies chest pain,+ SOB, diaphoresis or palpitations. GI: No nausea, vomiting, diarrhea or constipation. No melena or hematochezia. : No urinary complaints, dysuria, hematuria or frequency. MSK: No extremity weakness, paralysis or paresthesias.      PHYSICAL EXAM:    Vitals:  /71   Pulse 90   Temp 98.4 °F (36.9 °C) (Oral)   Resp 20   Ht 4' 9\" (1.448 m)   Wt 105 lb (47.6 kg)   SpO2 (!) 87% Comment: reapplied oxygen at 0.5L  BMI 22.72 kg/m²     General:  This is a 78 y.o. yo female who is alert and oriented in NAD  HEENT:  Head is normocephalic and atraumatic, PERRLA, EOMI, mucus membranes moist with no pharyngeal erythema or exudate. Neck:  Supple with no carotid bruits, JVD or thyromegaly.   No cervical adenopathy  CV:  Regular rate and rhythm, no murmurs  Lungs: Coarse decreased breath sounds to auscultation bilaterally with mild right-sided expiratory wheezes, no rales or rhonchi  Abdomen:  Soft, nontender, nondistended, bowel sounds present  Extremities:  No edema, peripheral pulses intact bilaterally  Neuro:  Cranial nerves II-XII grossly intact; motor and sensory function intact with no focal deficits  Skin:  No rashes, lesions or wounds    DATA:  CBC with Differential:    Lab Results   Component Value Date    WBC 13.6 05/07/2022    RBC 4.93 05/07/2022    HGB 15.0 05/07/2022    HCT 44.1 05/07/2022     05/07/2022    MCV 89.5 05/07/2022    MCH 30.4 05/07/2022    MCHC 34.0 05/07/2022    RDW 13.6 05/07/2022    LYMPHOPCT 39.8 05/07/2022    MONOPCT 8.4 05/07/2022    BASOPCT 0.6 05/07/2022    MONOSABS 1.14 05/07/2022    LYMPHSABS 5.41 05/07/2022    EOSABS 0.19 05/07/2022    BASOSABS 0.08 05/07/2022     CMP:    Lab Results   Component Value Date     05/07/2022    K 2.6 05/07/2022    CL 93 05/07/2022    CO2 33 05/07/2022    BUN 9 05/07/2022    CREATININE 0.5 05/07/2022    GFRAA >60 05/07/2022    LABGLOM >60 05/07/2022    GLUCOSE 97 05/07/2022    PROT 7.1 05/07/2022    LABALBU 3.8 05/07/2022    CALCIUM 9.5 05/07/2022    BILITOT 0.7 05/07/2022    ALKPHOS 104 05/07/2022    AST 28 05/07/2022    ALT 21 05/07/2022     Magnesium:    Lab Results   Component Value Date    MG 2.7 05/06/2022     Phosphorus:    Lab Results   Component Value Date    PHOS 3.2 05/07/2022     PT/INR:  No results found for: PROTIME, INR  Troponin:    Lab Results   Component Value Date    TROPONINI <0.01 04/01/2018     U/A: Lab Results   Component Value Date    COLORU Yellow 04/01/2018    PROTEINU TRACE 04/01/2018    PHUR 8.0 04/01/2018    WBCUA 0-1 04/01/2018    RBCUA 2-5 04/01/2018    BACTERIA NONE 04/01/2018    CLARITYU Clear 04/01/2018    SPECGRAV 1.010 04/01/2018    LEUKOCYTESUR Negative 04/01/2018    UROBILINOGEN 0.2 04/01/2018    BILIRUBINUR Negative 04/01/2018    BLOODU MODERATE 04/01/2018    GLUCOSEU Negative 04/01/2018     ABG:  No results found for: PH, PCO2, PO2, HCO3, BE, THGB, TCO2, O2SAT  HgBA1c:  No results found for: LABA1C  FLP:    Lab Results   Component Value Date    TRIG 133 05/07/2022    HDL 58 05/07/2022    LDLCALC 89 05/07/2022    LABVLDL 27 05/07/2022     TSH:    Lab Results   Component Value Date    TSH 2.610 05/07/2022     IRON:  No results found for: IRON  LIPASE:    Lab Results   Component Value Date    LIPASE 14 04/01/2018       ASSESSMENT AND PLAN:      Patient Active Problem List    Diagnosis Date Noted    Sacroiliitis  08/08/2016    COPD exacerbation ([address] 05/06/2022    Acquired hypothyroidism 05/05/2022    Essential hypertension 05/05/2022    Chronic pain of both shoulders 05/05/2022    GERD without esophagitis 05/05/2022    Acute respiratory failure with hypoxia ([address] 04/01/2018    DDD (degenerative disc disease), cervical 08/08/2016    Status post cervical spinal fusion 08/08/2016    Cervical osteoarthritis 08/08/2016    DDD (degenerative disc disease), lumbar 08/08/2016    Facet syndrome, lumbar 08/08/2016     Impression:  1. Severe hypokalemia  2. COPD with exacerbation with current tobacco abuse  3. History hypertension  4. History hyperlipidemia  5. History hypothyroidism  6. History of depression  7. Chronic diarrhea-3 bowel movements a day in the a.m. Plan:  Admit to monitored bed  Home medication reviewed  Activity up ad valentina.   General diet  Lovenox 40 mg subcu daily  KCl 20 milliequivalents p.o. 4 times daily  Serum magnesium -2.7  Hold hydrochlorothiazide  DuoNeb aerosols 4 times daily  Pulmicort aerosol twice daily  O2 saturation on room air at rest and if greater than 89% with activity  O2 nasal cannula as needed    IV fluids normal saline with 40 KCl at 100 cc an hour  BMP 6 PM  Solu-Medrol 40 mg IV push every 8 hours    BMP, magnesium in [email or web address] Deal DO, D.O.  5/7/2022

## 2022-05-08 LAB
ANION GAP SERPL CALCULATED.3IONS-SCNC: 10 MMOL/L (ref 7–16)
BUN BLDV-MCNC: 11 MG/DL (ref 6–23)
CALCIUM SERPL-MCNC: 8.8 MG/DL (ref 8.6–10.2)
CHLORIDE BLD-SCNC: 101 MMOL/L (ref 98–107)
CHLORIDE URINE RANDOM: 79 MMOL/L
CO2: 23 MMOL/L (ref 22–29)
CREAT SERPL-MCNC: 0.5 MG/DL (ref 0.5–1)
CREATININE URINE: 47 MG/DL (ref 29–226)
GFR AFRICAN AMERICAN: >60
GFR NON-AFRICAN AMERICAN: >60 ML/MIN/1.73
GLUCOSE BLD-MCNC: 158 MG/DL (ref 74–99)
POTASSIUM REFLEX MAGNESIUM: 4.2 MMOL/L (ref 3.5–5)
POTASSIUM, UR: 26.2 MMOL/L
SODIUM BLD-SCNC: 134 MMOL/L (ref 132–146)

## 2022-05-08 PROCEDURE — 82436 ASSAY OF URINE CHLORIDE: CPT

## 2022-05-08 PROCEDURE — 6360000002 HC RX W HCPCS: Performed by: INTERNAL MEDICINE

## 2022-05-08 PROCEDURE — 6370000000 HC RX 637 (ALT 250 FOR IP): Performed by: INTERNAL MEDICINE

## 2022-05-08 PROCEDURE — 36415 COLL VENOUS BLD VENIPUNCTURE: CPT

## 2022-05-08 PROCEDURE — 1200000000 HC SEMI PRIVATE

## 2022-05-08 PROCEDURE — 94640 AIRWAY INHALATION TREATMENT: CPT

## 2022-05-08 PROCEDURE — 80048 BASIC METABOLIC PNL TOTAL CA: CPT

## 2022-05-08 PROCEDURE — 84133 ASSAY OF URINE POTASSIUM: CPT

## 2022-05-08 PROCEDURE — 2700000000 HC OXYGEN THERAPY PER DAY

## 2022-05-08 PROCEDURE — 82570 ASSAY OF URINE CREATININE: CPT

## 2022-05-08 RX ADMIN — METHYLPREDNISOLONE SODIUM SUCCINATE 40 MG: 40 INJECTION, POWDER, FOR SOLUTION INTRAMUSCULAR; INTRAVENOUS at 19:56

## 2022-05-08 RX ADMIN — IBUPROFEN 400 MG: 400 TABLET, FILM COATED ORAL at 21:09

## 2022-05-08 RX ADMIN — BUDESONIDE 500 MCG: 0.5 INHALANT RESPIRATORY (INHALATION) at 06:30

## 2022-05-08 RX ADMIN — METHYLPREDNISOLONE SODIUM SUCCINATE 40 MG: 40 INJECTION, POWDER, FOR SOLUTION INTRAMUSCULAR; INTRAVENOUS at 12:36

## 2022-05-08 RX ADMIN — METHYLPREDNISOLONE SODIUM SUCCINATE 40 MG: 40 INJECTION, POWDER, FOR SOLUTION INTRAMUSCULAR; INTRAVENOUS at 06:18

## 2022-05-08 RX ADMIN — PANTOPRAZOLE SODIUM 40 MG: 40 TABLET, DELAYED RELEASE ORAL at 06:18

## 2022-05-08 RX ADMIN — IBUPROFEN 400 MG: 400 TABLET, FILM COATED ORAL at 08:30

## 2022-05-08 RX ADMIN — IBUPROFEN 400 MG: 400 TABLET, FILM COATED ORAL at 14:58

## 2022-05-08 RX ADMIN — POTASSIUM CHLORIDE 20 MEQ: 20 TABLET, EXTENDED RELEASE ORAL at 12:36

## 2022-05-08 RX ADMIN — LISINOPRIL 5 MG: 5 TABLET ORAL at 08:24

## 2022-05-08 RX ADMIN — ATORVASTATIN CALCIUM 20 MG: 20 TABLET, FILM COATED ORAL at 08:25

## 2022-05-08 RX ADMIN — LEVOTHYROXINE SODIUM 75 MCG: 0.07 TABLET ORAL at 06:18

## 2022-05-08 RX ADMIN — POTASSIUM CHLORIDE 20 MEQ: 20 TABLET, EXTENDED RELEASE ORAL at 21:09

## 2022-05-08 RX ADMIN — POTASSIUM CHLORIDE AND SODIUM CHLORIDE: 900; 300 INJECTION, SOLUTION INTRAVENOUS at 13:58

## 2022-05-08 RX ADMIN — IPRATROPIUM BROMIDE AND ALBUTEROL SULFATE 1 AMPULE: .5; 2.5 SOLUTION RESPIRATORY (INHALATION) at 14:07

## 2022-05-08 RX ADMIN — ENOXAPARIN SODIUM 30 MG: 100 INJECTION SUBCUTANEOUS at 08:25

## 2022-05-08 RX ADMIN — POTASSIUM CHLORIDE 20 MEQ: 20 TABLET, EXTENDED RELEASE ORAL at 08:25

## 2022-05-08 RX ADMIN — POTASSIUM CHLORIDE 20 MEQ: 20 TABLET, EXTENDED RELEASE ORAL at 19:59

## 2022-05-08 RX ADMIN — IPRATROPIUM BROMIDE AND ALBUTEROL SULFATE 1 AMPULE: .5; 2.5 SOLUTION RESPIRATORY (INHALATION) at 17:40

## 2022-05-08 RX ADMIN — IPRATROPIUM BROMIDE AND ALBUTEROL SULFATE 1 AMPULE: .5; 2.5 SOLUTION RESPIRATORY (INHALATION) at 06:30

## 2022-05-08 RX ADMIN — BUDESONIDE 500 MCG: 0.5 INHALANT RESPIRATORY (INHALATION) at 17:39

## 2022-05-08 ASSESSMENT — PAIN SCALES - GENERAL
PAINLEVEL_OUTOF10: 5
PAINLEVEL_OUTOF10: 4
PAINLEVEL_OUTOF10: 5
PAINLEVEL_OUTOF10: 8

## 2022-05-08 ASSESSMENT — PAIN DESCRIPTION - DESCRIPTORS
DESCRIPTORS: ACHING;DISCOMFORT;SORE
DESCRIPTORS: ACHING
DESCRIPTORS: ACHING;POUNDING

## 2022-05-08 ASSESSMENT — PAIN DESCRIPTION - LOCATION
LOCATION: HEAD
LOCATION: HEAD;NECK

## 2022-05-08 ASSESSMENT — PAIN - FUNCTIONAL ASSESSMENT
PAIN_FUNCTIONAL_ASSESSMENT: ACTIVITIES ARE NOT PREVENTED
PAIN_FUNCTIONAL_ASSESSMENT: ACTIVITIES ARE NOT PREVENTED

## 2022-05-08 ASSESSMENT — PAIN DESCRIPTION - FREQUENCY: FREQUENCY: INTERMITTENT

## 2022-05-08 ASSESSMENT — PAIN DESCRIPTION - ONSET: ONSET: ON-GOING

## 2022-05-08 NOTE — PROGRESS NOTES
Department of Internal Medicine        CHIEF COMPLAINT: Outpatient lab work showing hypokalemia    Reason for Admission: Severe hypokalemia, COPD exacerbation    HISTORY OF PRESENT ILLNESS:      The patient is a 78 y.o. female who presents with having outpatient lab work performed having potassium 2.2. This was repeated and was 2.2 again today. Serum magnesium 1.7. Patient does complain of 3 watery bowel movements every day. Patient denies any chest or abdominal pain. She denies any dizziness. She denies shortness of breath at rest.  BUN/creatinine was 10/0.6. CO2 is 35. O2 saturation was only 91% on room air at rest.  She does complain of some exertional dyspnea. Patient does have some underlying expiratory wheezing. Temperature 97.2 with heart rate 88 and blood pressure 135/106.    5/7/2022  Patient seen examined on telemetry floor. Patient denies any new aches or pains. The patient denies any chest pain, abdominal pain, nausea vomiting or unusual shortness of breath. Patient though oxygen saturations have dropped down this morning with minimal activity. Potassium is only increased to 2.6 today with BUN/creatinine 9/0.5. Lipid panel and liver enzymes are normal.  WBC is 13.6 with hemoglobin 15. Temperature is 98.4 with heart rate in 90 and blood pressure 124/71. O2 sat was 87%-93% on 1 L nasal cannula at rest.    5/8/2022  Patient seen examined on telemetry floor. Patient states he feels great. Patient denies having any significant diarrhea but she did have little bit of loose bowel movements in the past 24 hours. She denies any problem chest pain, abdominal pain, nausea vomiting or unusual shortness of breath at rest.  BUN/creatinine was 8/0.5 serum potassium was increased to 3.2. Patient received 100 mEq of KCl IV and 80 mill equivalents of KCl p.o in the past 24 hours. Temperature is 98.4 with heart rate of 100 and blood pressure ranges 122//69.   With the patient having refractory hypokalemia we will consult nephrology. Past Medical History:    Past Medical History:   Diagnosis Date    Bronchitis     Depression     GERD (gastroesophageal reflux disease)     Hyperlipidemia     Hypertension     Irritable bowel     Kidney stone     Migraines     Thyroid disease      Past Surgical History:    Past Surgical History:   Procedure Laterality Date    APPENDECTOMY      BACK SURGERY  2011    cervical disc surgery    HYSTERECTOMY      NERVE BLOCK Left 09/07/2016    sacroiliac joint #1    NERVE BLOCK Left 09/19/2016    si inj #2    NOSE SURGERY      bone removed d/t breathing    OTHER SURGICAL HISTORY Left 11/07/2016    sacroiliac joint radiofrequency    OTHER SURGICAL HISTORY Left 08/07/2017    radiofrequency, sacroiliac    SPINE SURGERY      SPLENECTOMY  age 23    d/t a tear etiology unknown    TONSILLECTOMY         Medications Prior to Admission:    @  Prior to Admission medications    Medication Sig Start Date End Date Taking? Authorizing Provider   lisinopril-hydroCHLOROthiazide (PRINZIDE;ZESTORETIC) 20-25 MG per tablet Take 1 tablet by mouth daily 5/5/22   Akash Hauser, DO   rizatriptan (MAXALT) 10 MG tablet Take 1 tablet by mouth once as needed for Migraine May repeat in 2 hours if needed 5/5/22 5/5/22  Akash Hauser, DO   atorvastatin (LIPITOR) 20 MG tablet Take 1 tablet by mouth daily 5/5/22   Akash Hauser, DO   omeprazole (PRILOSEC) 20 MG delayed release capsule Take 1 capsule by mouth daily 5/5/22   Akash Hauser, DO   levothyroxine (SYNTHROID) 75 MCG tablet Take 1 tablet by mouth Daily 5/5/22   Akash Hauser, DO   ibuprofen (ADVIL;MOTRIN) 400 MG tablet Take 1 tablet by mouth every 6 hours as needed for Pain 5/5/22   Akash Hauser, DO       Allergies:   Iodides, Peanuts [peanut oil], and Penicillins    Social History:   Social History     Socioeconomic History    Marital status:      Spouse name: Not on file    Number of children: Not on file  Years of education: Not on file    Highest education level: Not on file   Occupational History    Not on file   Tobacco Use    Smoking status: Current Every Day Smoker     Packs/day: 0.50     Years: 50.00     Pack years: 25.00     Types: Cigarettes    Smokeless tobacco: Never Used   Substance and Sexual Activity    Alcohol use: No    Drug use: No    Sexual activity: Not Currently   Other Topics Concern    Not on file   Social History Narrative    Not on file     Social Determinants of Health     Financial Resource Strain: High Risk    Difficulty of Paying Living Expenses: Hard   Food Insecurity: Food Insecurity Present    Worried About Running Out of Food in the Last Year: Often true    Luiz of Food in the Last Year: Sometimes true   Transportation Needs:     Lack of Transportation (Medical): Not on file    Lack of Transportation (Non-Medical):  Not on file   Physical Activity:     Days of Exercise per Week: Not on file    Minutes of Exercise per Session: Not on file   Stress:     Feeling of Stress : Not on file   Social Connections:     Frequency of Communication with Friends and Family: Not on file    Frequency of Social Gatherings with Friends and Family: Not on file    Attends Cheondo[employer] Services: Not on file    Active Member of [address] [place] [address] [email or web address] or Organizations: Not on file    Attends Club or Organization Meetings: Not on file    Marital Status: Not on file   Intimate Partner Violence:     Fear of Current or Ex-Partner: Not on file    Emotionally Abused: Not on file    Physically Abused: Not on file    Sexually Abused: Not on file   Housing Stability:     Unable to Pay for Housing in the Last Year: Not on file    Number of [place] in the Last Year: Not on file    Unstable Housing in the Last Year: Not on file       Family History:   Family History   Problem Relation Age of Onset    Arthritis Other     Heart Disease Other     High Blood Pressure Other        REVIEW OF SYSTEMS:    Gen: Patient denies any lightheadedness or dizziness. No LOC or syncope. No fevers or chills. HEENT: No earache, sore throat or nasal congestion. Resp: Denies cough, hemoptysis or sputum production. Cardiac: Denies chest pain,+ SOB, diaphoresis or palpitations. GI: No nausea, vomiting, diarrhea or constipation. No melena or hematochezia. : No urinary complaints, dysuria, hematuria or frequency. MSK: No extremity weakness, paralysis or paresthesias. PHYSICAL EXAM:    Vitals:  BP (!) 140/69   Pulse 101   Temp 98.4 °F (36.9 °C) (Oral)   Resp 18   Ht 4' 9\" (1.448 m)   Wt 105 lb (47.6 kg)   SpO2 94%   BMI 22.72 kg/m²     General:  This is a 78 y.o. yo female who is alert and oriented in NAD  HEENT:  Head is normocephalic and atraumatic, PERRLA, EOMI, mucus membranes moist with no pharyngeal erythema or exudate. Neck:  Supple with no carotid bruits, JVD or thyromegaly.   No cervical adenopathy  CV:  Regular rate and rhythm, no murmurs  Lungs: Coarse decreased breath sounds to auscultation bilaterally mild scattered rhonchi without wheezes, no rales   Abdomen:  Soft, nontender, nondistended, bowel sounds present  Extremities:  No edema, peripheral pulses intact bilaterally  Neuro:  Cranial nerves II-XII grossly intact; motor and sensory function intact with no focal deficits  Skin:  No rashes, lesions or wounds    DATA:  CBC with Differential:    Lab Results   Component Value Date    WBC 13.6 05/07/2022    RBC 4.93 05/07/2022    HGB 15.0 05/07/2022    HCT 44.1 05/07/2022     05/07/2022    MCV 89.5 05/07/2022    MCH 30.4 05/07/2022    MCHC 34.0 05/07/2022    RDW 13.6 05/07/2022    LYMPHOPCT 39.8 05/07/2022    MONOPCT 8.4 05/07/2022    BASOPCT 0.6 05/07/2022    MONOSABS 1.14 05/07/2022    LYMPHSABS 5.41 05/07/2022    EOSABS 0.19 05/07/2022    BASOSABS 0.08 05/07/2022     CMP:    Lab Results   Component Value Date     05/07/2022    K 3.2 05/07/2022    CL 93 05/07/2022 CO2 27 05/07/2022    BUN 8 05/07/2022    CREATININE 0.5 05/07/2022    GFRAA >60 05/07/2022    LABGLOM >60 05/07/2022    GLUCOSE 184 05/07/2022    PROT 7.1 05/07/2022    LABALBU 3.8 05/07/2022    CALCIUM 8.8 05/07/2022    BILITOT 0.7 05/07/2022    ALKPHOS 104 05/07/2022    AST 28 05/07/2022    ALT 21 05/07/2022     Magnesium:    Lab Results   Component Value Date    MG 2.7 05/06/2022     Phosphorus:    Lab Results   Component Value Date    PHOS 3.2 05/07/2022     PT/INR:  No results found for: PROTIME, INR  Troponin:    Lab Results   Component Value Date    TROPONINI <0.01 04/01/2018     U/A:    Lab Results   Component Value Date    COLORU Yellow 04/01/2018    PROTEINU TRACE 04/01/2018    PHUR 8.0 04/01/2018    WBCUA 0-1 04/01/2018    RBCUA 2-5 04/01/2018    BACTERIA NONE 04/01/2018    CLARITYU Clear 04/01/2018    SPECGRAV 1.010 04/01/2018    LEUKOCYTESUR Negative 04/01/2018    UROBILINOGEN 0.2 04/01/2018    BILIRUBINUR Negative 04/01/2018    BLOODU MODERATE 04/01/2018    GLUCOSEU Negative 04/01/2018     ABG:  No results found for: PH, PCO2, PO2, HCO3, BE, THGB, TCO2, O2SAT  HgBA1c:  No results found for: LABA1C  FLP:    Lab Results   Component Value Date    TRIG 133 05/07/2022    HDL 58 05/07/2022    LDLCALC 89 05/07/2022    LABVLDL 27 05/07/2022     TSH:    Lab Results   Component Value Date    TSH 2.610 05/07/2022     IRON:  No results found for: IRON  LIPASE:    Lab Results   Component Value Date    LIPASE 14 04/01/2018       ASSESSMENT AND PLAN:      Patient Active Problem List    Diagnosis Date Noted    Sacroiliitis  08/08/2016    COPD exacerbation ([address] 05/06/2022    Acquired hypothyroidism 05/05/2022    Essential hypertension 05/05/2022    Chronic pain of both shoulders 05/05/2022    GERD without esophagitis 05/05/2022    Acute respiratory failure with hypoxia [address] [address] 04/01/2018    DDD (degenerative disc disease), cervical 08/08/2016    Status post cervical spinal fusion 08/08/2016    Cervical osteoarthritis 08/08/2016    DDD (degenerative disc disease), lumbar 08/08/2016    Facet syndrome, lumbar 08/08/2016     Impression:  1. Severe hypokalemia  2. Acute hypoxic respiratory failure with COPD exacerbation and current tobacco abuse  3. History hypertension  4. History hyperlipidemia  5. History hypothyroidism  6. History of depression  7. Chronic diarrhea-3 bowel movements a day in the a.m. Plan:  Admit to monitored bed  Home medication reviewed  Activity up ad valentina. General diet  Lovenox 40 mg subcu daily  KCl 20 milliequivalents p.o. 4 times daily  Serum magnesium -2.7  Hold hydrochlorothiazide  DuoNeb aerosols 4 times daily  Pulmicort aerosol twice daily  O2 saturation on room air at rest and if greater than 89% with activity  O2 nasal cannula as needed    IV fluids normal saline with 40 KCl at 100 cc an hour  Solu-Medrol 40 mg IV push every 8 hours  Repeat BMP 4 PM today  Consult nephrology with patient having refractory hypokalemia    BMP, CBC and magnesium in a.m.     Jazmine Vitale DO, D.O.  5/8/2022

## 2022-05-08 NOTE — CONSULTS
Patient seen and examined. Consult dictated. Patient is a 72-year-old lady who presented with outpatient findings of severe hypokalemia with a serum potassium of 2.2 mmol/L. Patient gave history of profuse watery diarrhea upon presentation. Serum potassium levels have improved but rather slowly. Patient also has history of COPD and has been on intravenous steroids since admission. Although the patient's initial hypokalemia may be due to diarrhea and GI losses the patient's less than optimal rate of improvement of hypokalemia may be partly due to use of steroids. Steroids are commonly associated with hypokalemia and patient has been on Solu-Medrol intravenously every 8 hours. We will check urine studies to assess urinary potassium losses. Follow serial electrolytes.  , Thank You for allowing me to participate in the care of this patient. Will follow the patient with you.     Avinash Allen MD  Nephrology    Electronically signed by Charanjit Acosta MD on 5/8/2022 at 4:52 PM

## 2022-05-09 LAB
ANION GAP SERPL CALCULATED.3IONS-SCNC: 10 MMOL/L (ref 7–16)
ANION GAP SERPL CALCULATED.3IONS-SCNC: 11 MMOL/L (ref 7–16)
ANISOCYTOSIS: ABNORMAL
BASOPHILS ABSOLUTE: 0 E9/L (ref 0–0.2)
BASOPHILS RELATIVE PERCENT: 0.2 % (ref 0–2)
BUN BLDV-MCNC: 11 MG/DL (ref 6–23)
BUN BLDV-MCNC: 9 MG/DL (ref 6–23)
BURR CELLS: ABNORMAL
CALCIUM SERPL-MCNC: 9.4 MG/DL (ref 8.6–10.2)
CALCIUM SERPL-MCNC: 9.4 MG/DL (ref 8.6–10.2)
CHLORIDE BLD-SCNC: 101 MMOL/L (ref 98–107)
CHLORIDE BLD-SCNC: 107 MMOL/L (ref 98–107)
CO2: 19 MMOL/L (ref 22–29)
CO2: 20 MMOL/L (ref 22–29)
CREAT SERPL-MCNC: 0.4 MG/DL (ref 0.5–1)
CREAT SERPL-MCNC: 0.4 MG/DL (ref 0.5–1)
EOSINOPHILS ABSOLUTE: 0 E9/L (ref 0.05–0.5)
EOSINOPHILS RELATIVE PERCENT: 0.1 % (ref 0–6)
GFR AFRICAN AMERICAN: >60
GFR AFRICAN AMERICAN: >60
GFR NON-AFRICAN AMERICAN: >60 ML/MIN/1.73
GFR NON-AFRICAN AMERICAN: >60 ML/MIN/1.73
GLUCOSE BLD-MCNC: 110 MG/DL (ref 74–99)
GLUCOSE BLD-MCNC: 162 MG/DL (ref 74–99)
HCT VFR BLD CALC: 43 % (ref 34–48)
HEMOGLOBIN: 14.1 G/DL (ref 11.5–15.5)
LYMPHOCYTES ABSOLUTE: 0.96 E9/L (ref 1.5–4)
LYMPHOCYTES RELATIVE PERCENT: 3.5 % (ref 20–42)
MAGNESIUM: 2 MG/DL (ref 1.6–2.6)
MCH RBC QN AUTO: 30.3 PG (ref 26–35)
MCHC RBC AUTO-ENTMCNC: 32.8 % (ref 32–34.5)
MCV RBC AUTO: 92.3 FL (ref 80–99.9)
MONOCYTES ABSOLUTE: 0 E9/L (ref 0.1–0.95)
MONOCYTES RELATIVE PERCENT: 3.1 % (ref 2–12)
NEUTROPHILS ABSOLUTE: 23.38 E9/L (ref 1.8–7.3)
NEUTROPHILS RELATIVE PERCENT: 96.5 % (ref 43–80)
OVALOCYTES: ABNORMAL
PDW BLD-RTO: 14.6 FL (ref 11.5–15)
PLATELET # BLD: 485 E9/L (ref 130–450)
PMV BLD AUTO: 9.7 FL (ref 7–12)
POIKILOCYTES: ABNORMAL
POTASSIUM SERPL-SCNC: 4.8 MMOL/L (ref 3.5–5)
POTASSIUM SERPL-SCNC: 5.4 MMOL/L (ref 3.5–5)
RBC # BLD: 4.66 E12/L (ref 3.5–5.5)
SODIUM BLD-SCNC: 131 MMOL/L (ref 132–146)
SODIUM BLD-SCNC: 137 MMOL/L (ref 132–146)
WBC # BLD: 24.1 E9/L (ref 4.5–11.5)

## 2022-05-09 PROCEDURE — 83735 ASSAY OF MAGNESIUM: CPT

## 2022-05-09 PROCEDURE — 6360000002 HC RX W HCPCS: Performed by: INTERNAL MEDICINE

## 2022-05-09 PROCEDURE — 6370000000 HC RX 637 (ALT 250 FOR IP): Performed by: INTERNAL MEDICINE

## 2022-05-09 PROCEDURE — 2700000000 HC OXYGEN THERAPY PER DAY

## 2022-05-09 PROCEDURE — 94640 AIRWAY INHALATION TREATMENT: CPT

## 2022-05-09 PROCEDURE — 85025 COMPLETE CBC W/AUTO DIFF WBC: CPT

## 2022-05-09 PROCEDURE — 36415 COLL VENOUS BLD VENIPUNCTURE: CPT

## 2022-05-09 PROCEDURE — 80048 BASIC METABOLIC PNL TOTAL CA: CPT

## 2022-05-09 PROCEDURE — 1200000000 HC SEMI PRIVATE

## 2022-05-09 RX ORDER — PREDNISONE 10 MG/1
30 TABLET ORAL DAILY
Qty: 6 TABLET | Refills: 0 | Status: SHIPPED | OUTPATIENT
Start: 2022-05-09 | End: 2022-05-13

## 2022-05-09 RX ORDER — FUROSEMIDE 10 MG/ML
20 INJECTION INTRAMUSCULAR; INTRAVENOUS ONCE
Status: COMPLETED | OUTPATIENT
Start: 2022-05-09 | End: 2022-05-09

## 2022-05-09 RX ORDER — LISINOPRIL 20 MG/1
20 TABLET ORAL DAILY
Qty: 60 TABLET | Refills: 3 | Status: SHIPPED | OUTPATIENT
Start: 2022-05-10 | End: 2022-10-18

## 2022-05-09 RX ORDER — ALBUTEROL SULFATE 90 UG/1
2 AEROSOL, METERED RESPIRATORY (INHALATION) EVERY 4 HOURS PRN
Qty: 18 G | Refills: 3 | Status: SHIPPED | OUTPATIENT
Start: 2022-05-09 | End: 2022-10-19

## 2022-05-09 RX ORDER — BUDESONIDE AND FORMOTEROL FUMARATE DIHYDRATE 80; 4.5 UG/1; UG/1
2 AEROSOL RESPIRATORY (INHALATION) 2 TIMES DAILY
Qty: 10.2 G | Refills: 3 | Status: SHIPPED | OUTPATIENT
Start: 2022-05-09 | End: 2022-10-19

## 2022-05-09 RX ADMIN — FUROSEMIDE 20 MG: 10 INJECTION, SOLUTION INTRAMUSCULAR; INTRAVENOUS at 15:41

## 2022-05-09 RX ADMIN — ACETAMINOPHEN 500 MG: 500 TABLET ORAL at 11:18

## 2022-05-09 RX ADMIN — IBUPROFEN 400 MG: 400 TABLET, FILM COATED ORAL at 18:44

## 2022-05-09 RX ADMIN — IPRATROPIUM BROMIDE AND ALBUTEROL SULFATE 1 AMPULE: .5; 2.5 SOLUTION RESPIRATORY (INHALATION) at 10:38

## 2022-05-09 RX ADMIN — ATORVASTATIN CALCIUM 20 MG: 20 TABLET, FILM COATED ORAL at 09:02

## 2022-05-09 RX ADMIN — IPRATROPIUM BROMIDE AND ALBUTEROL SULFATE 1 AMPULE: .5; 2.5 SOLUTION RESPIRATORY (INHALATION) at 07:36

## 2022-05-09 RX ADMIN — POTASSIUM CHLORIDE 20 MEQ: 20 TABLET, EXTENDED RELEASE ORAL at 09:01

## 2022-05-09 RX ADMIN — LISINOPRIL 5 MG: 5 TABLET ORAL at 09:01

## 2022-05-09 RX ADMIN — METHYLPREDNISOLONE SODIUM SUCCINATE 40 MG: 40 INJECTION, POWDER, FOR SOLUTION INTRAMUSCULAR; INTRAVENOUS at 04:29

## 2022-05-09 RX ADMIN — IBUPROFEN 400 MG: 400 TABLET, FILM COATED ORAL at 06:03

## 2022-05-09 RX ADMIN — POTASSIUM CHLORIDE 20 MEQ: 20 TABLET, EXTENDED RELEASE ORAL at 12:40

## 2022-05-09 RX ADMIN — ENOXAPARIN SODIUM 30 MG: 100 INJECTION SUBCUTANEOUS at 09:02

## 2022-05-09 RX ADMIN — POTASSIUM CHLORIDE AND SODIUM CHLORIDE: 900; 300 INJECTION, SOLUTION INTRAVENOUS at 10:01

## 2022-05-09 RX ADMIN — BUDESONIDE 500 MCG: 0.5 INHALANT RESPIRATORY (INHALATION) at 07:36

## 2022-05-09 RX ADMIN — PANTOPRAZOLE SODIUM 40 MG: 40 TABLET, DELAYED RELEASE ORAL at 06:04

## 2022-05-09 RX ADMIN — IPRATROPIUM BROMIDE AND ALBUTEROL SULFATE 1 AMPULE: .5; 2.5 SOLUTION RESPIRATORY (INHALATION) at 15:17

## 2022-05-09 RX ADMIN — LEVOTHYROXINE SODIUM 75 MCG: 0.07 TABLET ORAL at 06:04

## 2022-05-09 RX ADMIN — IBUPROFEN 400 MG: 400 TABLET, FILM COATED ORAL at 12:39

## 2022-05-09 RX ADMIN — PREDNISONE 30 MG: 20 TABLET ORAL at 11:59

## 2022-05-09 ASSESSMENT — PAIN DESCRIPTION - ONSET: ONSET: ON-GOING

## 2022-05-09 ASSESSMENT — PAIN DESCRIPTION - ORIENTATION
ORIENTATION: RIGHT
ORIENTATION: POSTERIOR;RIGHT;LEFT

## 2022-05-09 ASSESSMENT — PAIN SCALES - GENERAL
PAINLEVEL_OUTOF10: 8
PAINLEVEL_OUTOF10: 7
PAINLEVEL_OUTOF10: 0
PAINLEVEL_OUTOF10: 2
PAINLEVEL_OUTOF10: 3
PAINLEVEL_OUTOF10: 5

## 2022-05-09 ASSESSMENT — PAIN - FUNCTIONAL ASSESSMENT: PAIN_FUNCTIONAL_ASSESSMENT: ACTIVITIES ARE NOT PREVENTED

## 2022-05-09 ASSESSMENT — PAIN DESCRIPTION - DESCRIPTORS
DESCRIPTORS: DULL;ACHING;DISCOMFORT
DESCRIPTORS: ACHING;DISCOMFORT;DULL
DESCRIPTORS: NAGGING
DESCRIPTORS: ACHING;DISCOMFORT

## 2022-05-09 ASSESSMENT — PAIN DESCRIPTION - PAIN TYPE: TYPE: CHRONIC PAIN

## 2022-05-09 ASSESSMENT — PAIN DESCRIPTION - LOCATION
LOCATION: NECK
LOCATION: NECK

## 2022-05-09 ASSESSMENT — PAIN DESCRIPTION - FREQUENCY: FREQUENCY: INTERMITTENT

## 2022-05-09 NOTE — CARE COORDINATION
5/9/2022 1216 CM note: No covid testing. Met with patient for transition of care needs. Pt resides with her  in a 1 story home, 3 step entry with rail. She relays she is independent with ADLs. PCP is Dr Frantz Rodriguez and uses [facility]. Pt on room air and does not qualify for home o2 per nursing documentation. She is anxious for dc and plans to return home at dc, her  will provide a ride.  Rafael WRIGHT

## 2022-05-09 NOTE — CONSULTS
[address]                                  CONSULTATION    PATIENT NAME: Eugenia Leiva                     :        1945  MED REC NO:   [id number]                            ROOM:       [id number]  ACCOUNT NO:   [de-identified]                           ADMIT DATE: 2022  PROVIDER:     Shante Triana MD    CONSULT DATE:  2022    ADMITTING PROVIDER:  Ariel Fam DO    REASON FOR CONSULTATION:  Hypokalemia. HISTORY OF PRESENT ILLNESS:  The patient is being seen in consultation  at the request of Dr. Richa Moralez. The patient is a 29-year-old   lady who was admitted after she presented to the emergency room at [facility] with a serum potassium of 2.2 mmol/L  found on outpatient labs. She denies any prior history of hypokalemia. She was not on any potassium supplements, but states that she has been  having diarrhea. The patient had a borderline serum magnesium level. The patient states that she has been having almost three to four loose  watery bowel movements a day for the last couple of weeks. The patient  has been given potassium supplements and the potassium has improved, but  is still not normal.  When seen up on the floor, the patient is without  complaint and anxious to be discharged home. PAST MEDICAL HISTORY:  Significant for history of hypertension,  hyperlipidemia, irritable bowel syndrome, nephrolithiasis,  hypothyroidism, gastroesophageal reflux disease, depression, bronchitis. PAST SURGICAL HISTORY:  Significant for appendectomy, hysterectomy,  splenectomy due to a traumatic tear, tonsillectomy, surgery for  resection of nasal septum, nerve block procedures and cervical disk  surgery on the back. ALLERGIES:  The patient is allergic to IODINE and PENICILLIN. The  patient has food allergies to PEANUTS. SOCIAL HISTORY:  The patient is .   She smokes half a pack of  cigarettes a day for the last 50 years. Denies any alcohol use. FAMILY HISTORY:  Negative for any nephrolithiasis or hypokalemia. There  is a history of atherosclerotic heart disease and hypotension in her  parents. REVIEW OF SYSTEMS:  Negative for fever or chills. No cough or wheezing. No chest pain or palpitations. No shortness of breath, but does have  dyspnea on exertion. No orthopnea. No paroxysmal nocturnal dyspnea. No abdominal pain, but she has had diarrhea. No dysuria or increased  frequency of micturition. MEDICATIONS:  Medications prior to admission included lisinopril/HCTZ  20/25 one daily, Maxalt (rizatriptan) 10 mg daily p.r.n., atorvastatin  20 mg daily, omeprazole 20 mg daily, levothyroxine 75 mcg daily,  ibuprofen 400 mg q.8 hours p.r.n. The patient's current medications in the hospital are atorvastatin 20 mg  daily, Pulmicort inhaler twice a day, Lovenox 30 mg subcutaneous daily,  DuoNeb inhaler q.4 hours while awake, levothyroxine 75 mcg daily,  lisinopril 5 mg daily, prednisone 40 mg IV q.8 hours, pantoprazole 40 mg  daily. The patient is on potassium chloride 20 mEq four times a day. PHYSICAL EXAMINATION:  GENERAL:  The patient is awake and alert, in no acute distress. VITAL SIGNS:  Blood pressure is 140/69, pulse is 101, respiratory rate  is 18, and temperature 98.4 degrees Fahrenheit. Pulse ox saturation 94%  on oxygen by nasal cannula. HEENT:  Head is normocephalic and atraumatic. Eyes:  Sclerae are  nonicteric. Pupils are equal and reactive to light and accommodation. Fundus examination was deferred. Mouth and throat:  Dry oral mucosa  without any mucosal ulcerations or exudates. NECK:  Supple. No distention. No carotid bruits. No palpable masses. CHEST:  Symmetrical.  LUNGS:  Bilateral coarse breath sounds with scattered rhonchi. HEART:  Regular rate and rhythm without any pericardial rub or gallop. ABDOMEN:  Soft, nontender. Normal bowel sounds. No rebound tenderness. No palpable masses. EXTREMITIES:  No pedal edema. LABORATORY DATA:  Lab data from this morning:  Sodium 133 mmol/L,  potassium 3.2 mmol/L. Lab data later today:  Sodium 134 mmol/L,  potassium 4.2 mmol/L. Urine studies are pending. IMPRESSION:  1. Hypokalemia. Hypokalemia of undetermined etiology. We will check  urine potassium and creatinine to calculate urinary potassium wasting. Most likely the patient had initial hypokalemia upon presentation due to  GI losses with diarrhea. The patient has been on intravenous steroid  since admission and the patient's ongoing hypokalemia may have been  perpetuated by use of steroids. We will follow serial potassium and  magnesium levels. 2.  Hyponatremia. Hyponatremia has improved. 3.  Benign essential hypertension. Hypertension control is fair. PLAN:  Plan is to follow serial electrolytes, check urine electrolytes  and creatinine. If needed, we will check urine and serum osmolality to  calculate the transtubular gradient. Rest of plans per order. Thank you for allowing me to participate in the care of your patient.    We will follow        Monda Lennox, MD    D: 05/08/2022 17:01:57       T: 05/08/2022 18:20:16     [email or web address]  Job#: [id number]     Doc#: [id number]    CC:

## 2022-05-09 NOTE — PROGRESS NOTES
Nephrology Note  Rosie Cerrato MD          Patient seen and examined. No family member is present at bedside. Chart reviewed. Patient is feeling better. She states her diarrhea has improved. She is anxious to be discharged home. Denies shortness of breath. Appetite good. No nausea or dysguesia. Awake and alert . In no acute distress. Vital SignsBP (!) 190/94   Pulse 91   Temp 97.5 °F (36.4 °C) (Oral)   Resp 18   Ht 4' 9\" (1.448 m)   Wt 105 lb (47.6 kg)   SpO2 96%   BMI 22.72 kg/m²   24HR INTAKE/OUTPUT:    Intake/Output Summary (Last 24 hours) at 5/9/2022 1331  Last data filed at 5/9/2022 0558  Gross per 24 hour   Intake 1133.81 ml   Output 350 ml   Net 783.81 ml         Physical Exam    Neck: No JVD  Lungs: Breath sounds decreased at the bases. Bilateral coarse breath sounds with scattered expiratory rhonchi. Heart: Regular rate and rhythm. No S3 gallop. No  murmrur. Abdomen: Soft non distended, non tender and normal bowel sounds. Extremeties: No edema.           Current Facility-Administered Medications   Medication Dose Route Frequency Provider Last Rate Last Admin    predniSONE (DELTASONE) tablet 30 mg  30 mg Oral Daily Brijesh Daunt, DO   30 mg at 05/09/22 1159    0.9% NaCl with KCl 40 mEq infusion   IntraVENous Continuous Brijesh Daunt,  mL/hr at 05/09/22 1001 New Bag at 05/09/22 1001    potassium chloride (KLOR-CON M) extended release tablet 20 mEq  20 mEq Oral 4x Daily PC & HS Brijesh Daunt, DO   20 mEq at 05/09/22 1240    atorvastatin (LIPITOR) tablet 20 mg  20 mg Oral Daily Brijesh Daunt, DO   20 mg at 05/09/22 0902    pantoprazole (PROTONIX) tablet 40 mg  40 mg Oral QAM AC Arcenio Diaz, DO   40 mg at 05/09/22 0604    levothyroxine (SYNTHROID) tablet 75 mcg  75 mcg Oral Daily Brijesh Daunt, DO   75 mcg at 05/09/22 0604    ibuprofen (ADVIL;MOTRIN) tablet 400 mg  400 mg Oral Q6H PRN Brijesh Daunt, DO   400 mg at 05/09/22 1239    acetaminophen (TYLENOL) tablet 500 mg  500 mg Oral Q6H PRN Upton Fee, DO   500 mg at 05/09/22 1118    ipratropium-albuterol (DUONEB) nebulizer solution 1 ampule  1 ampule Inhalation Q4H WA Jose E Fee, DO   1 ampule at 05/09/22 1038    budesonide (PULMICORT) nebulizer suspension 500 mcg  500 mcg Nebulization BID Upton Fee, DO   500 mcg at 05/09/22 0736    prochlorperazine (COMPAZINE) injection 5 mg  5 mg IntraVENous Q6H PRN Upton Fee, DO        polyethylene glycol (GLYCOLAX) packet 17 g  17 g Oral Daily PRN Jose E Fee, DO        lisinopril (PRINIVIL;ZESTRIL) tablet 5 mg  5 mg Oral Daily Upton Fee, DO   5 mg at 05/09/22 0901    enoxaparin Sodium (LOVENOX) injection 30 mg  30 mg SubCUTAneous Daily Jose E Fee, DO   30 mg at 05/09/22 0902       CT CHEST WO CONTRAST   Final Result   Chronic changes again noted of upper lobe predominant emphysema. There is   subpleural reticulation and questionable honeycombing development which may   be progressed from remote comparison 2018 in the lower lungs right greater   than left with intervening or intermixed opacifications at the right lung   base somewhat less diffuse on prior comparison where these are present with   more focal areas of nodular densities or confluent opacities 18 mm and 17 mm   respectively. While these may represent inflammatory nodules close interval   attention on follow-up to exclude underlying or status or etiology such as   malignancy.                Labs:    CBC:   Recent Labs     05/07/22  0414 05/09/22  0609   WBC 13.6* 24.1*   HGB 15.0 14.1   * 485*        No results found for: IRON, TIBC, FERRITIN    Lab Results   Component Value Date    CALCIUM 8.8 05/08/2022    CALCIUM 8.8 05/07/2022    CALCIUM 9.5 05/07/2022    PHOS 3.2 05/07/2022    MG 2.0 05/09/2022    MG 2.7 (H) 05/06/2022    MG 1.7 05/06/2022       BMP:   Recent Labs     05/07/22  0414 05/07/22  1834 05/08/22  1554    133 134   K 2.6* 3.2* 4.2 CL 93* 93* 101   CO2 33* 27 23   BUN 9 8 11   CREATININE 0.5 0.5 0.5   GLUCOSE 97 184* 158*       BMP from today is pending. Assessment: / Plan:    1. Hypo kalemia. .  Hypokalemia in this patient upon presentation secondary to diuretics and diarrhea. This may have been further pretreated by use of steroids. Serum potassium level had already improved to 4.2 mmol/L yesterday evening. Labs from this morning are still pending. Urine potassium from yesterday evening was 26.2 almost a liter but at that time serum potassium was already 4.2 mmol/L.       2.  Benign essential hypertension. Blood pressure are much above target of less than 130/80 mmHg. Will increase the dose of ACE inhibitor after the patient's labs show fairly acceptable serum potassium. 3.  Hyponatremia. .  Patient most likely with hypovolemic hyponatremia. Improved. Israel Ness MD  Nephrology  Electronically signed by Israel Ness MD on 5/9/2022 at 12:42 PM      Note: This report was completed utilizing computer voice recognition software. Every effort has been made to ensure accuracy, however; inadvertent computerized transcription errors may be present.

## 2022-05-09 NOTE — ACP (ADVANCE CARE PLANNING)
Advance Care Planning   Healthcare Decision Maker:    Primary Decision Maker: Romaine Baldomero - [phone]    Click here to complete Healthcare Decision Makers including selection of the Healthcare Decision Maker Relationship (ie \"Primary\").

## 2022-05-10 VITALS
BODY MASS INDEX: 22.65 KG/M2 | RESPIRATION RATE: 18 BRPM | OXYGEN SATURATION: 92 % | SYSTOLIC BLOOD PRESSURE: 172 MMHG | HEART RATE: 91 BPM | WEIGHT: 105 LBS | DIASTOLIC BLOOD PRESSURE: 80 MMHG | HEIGHT: 57 IN | TEMPERATURE: 96.7 F

## 2022-05-10 LAB
ANION GAP SERPL CALCULATED.3IONS-SCNC: 12 MMOL/L (ref 7–16)
BUN BLDV-MCNC: 13 MG/DL (ref 6–23)
CALCIUM SERPL-MCNC: 9.8 MG/DL (ref 8.6–10.2)
CHLORIDE BLD-SCNC: 99 MMOL/L (ref 98–107)
CO2: 24 MMOL/L (ref 22–29)
CREAT SERPL-MCNC: 0.5 MG/DL (ref 0.5–1)
GFR AFRICAN AMERICAN: >60
GFR NON-AFRICAN AMERICAN: >60 ML/MIN/1.73
GLUCOSE BLD-MCNC: 82 MG/DL (ref 74–99)
MAGNESIUM: 1.9 MG/DL (ref 1.6–2.6)
POTASSIUM SERPL-SCNC: 4.4 MMOL/L (ref 3.5–5)
SODIUM BLD-SCNC: 135 MMOL/L (ref 132–146)

## 2022-05-10 PROCEDURE — 80048 BASIC METABOLIC PNL TOTAL CA: CPT

## 2022-05-10 PROCEDURE — 6370000000 HC RX 637 (ALT 250 FOR IP): Performed by: INTERNAL MEDICINE

## 2022-05-10 PROCEDURE — 94640 AIRWAY INHALATION TREATMENT: CPT

## 2022-05-10 PROCEDURE — 6360000002 HC RX W HCPCS: Performed by: INTERNAL MEDICINE

## 2022-05-10 PROCEDURE — 83735 ASSAY OF MAGNESIUM: CPT

## 2022-05-10 PROCEDURE — 36415 COLL VENOUS BLD VENIPUNCTURE: CPT

## 2022-05-10 RX ORDER — BUDESONIDE AND FORMOTEROL FUMARATE DIHYDRATE 80; 4.5 UG/1; UG/1
2 AEROSOL RESPIRATORY (INHALATION) 2 TIMES DAILY
Status: DISCONTINUED | OUTPATIENT
Start: 2022-05-10 | End: 2022-05-10 | Stop reason: HOSPADM

## 2022-05-10 RX ADMIN — IPRATROPIUM BROMIDE AND ALBUTEROL SULFATE 1 AMPULE: .5; 2.5 SOLUTION RESPIRATORY (INHALATION) at 10:16

## 2022-05-10 RX ADMIN — ENOXAPARIN SODIUM 30 MG: 100 INJECTION SUBCUTANEOUS at 09:23

## 2022-05-10 RX ADMIN — ATORVASTATIN CALCIUM 20 MG: 20 TABLET, FILM COATED ORAL at 09:23

## 2022-05-10 RX ADMIN — LISINOPRIL 5 MG: 5 TABLET ORAL at 09:23

## 2022-05-10 RX ADMIN — IBUPROFEN 400 MG: 400 TABLET, FILM COATED ORAL at 05:20

## 2022-05-10 RX ADMIN — BUDESONIDE 500 MCG: 0.5 INHALANT RESPIRATORY (INHALATION) at 06:51

## 2022-05-10 RX ADMIN — PANTOPRAZOLE SODIUM 40 MG: 40 TABLET, DELAYED RELEASE ORAL at 05:13

## 2022-05-10 RX ADMIN — IPRATROPIUM BROMIDE AND ALBUTEROL SULFATE 1 AMPULE: .5; 2.5 SOLUTION RESPIRATORY (INHALATION) at 06:51

## 2022-05-10 RX ADMIN — PREDNISONE 30 MG: 20 TABLET ORAL at 09:23

## 2022-05-10 RX ADMIN — LEVOTHYROXINE SODIUM 75 MCG: 0.07 TABLET ORAL at 05:13

## 2022-05-10 ASSESSMENT — PAIN SCALES - GENERAL: PAINLEVEL_OUTOF10: 7

## 2022-05-10 NOTE — PROGRESS NOTES
Department of Internal Medicine        CHIEF COMPLAINT: Outpatient lab work showing hypokalemia    Reason for Admission: Severe hypokalemia, COPD exacerbation    HISTORY OF PRESENT ILLNESS:      The patient is a 78 y.o. female who presents with having outpatient lab work performed having potassium 2.2. This was repeated and was 2.2 again today. Serum magnesium 1.7. Patient does complain of 3 watery bowel movements every day. Patient denies any chest or abdominal pain. She denies any dizziness. She denies shortness of breath at rest.  BUN/creatinine was 10/0.6. CO2 is 35. O2 saturation was only 91% on room air at rest.  She does complain of some exertional dyspnea. Patient does have some underlying expiratory wheezing. Temperature 97.2 with heart rate 88 and blood pressure 135/106.    5/7/2022  Patient seen examined on telemetry floor. Patient denies any new aches or pains. The patient denies any chest pain, abdominal pain, nausea vomiting or unusual shortness of breath. Patient though oxygen saturations have dropped down this morning with minimal activity. Potassium is only increased to 2.6 today with BUN/creatinine 9/0.5. Lipid panel and liver enzymes are normal.  WBC is 13.6 with hemoglobin 15. Temperature is 98.4 with heart rate in 90 and blood pressure 124/71. O2 sat was 87%-93% on 1 L nasal cannula at rest.    5/8/2022  Patient seen examined on telemetry floor. Patient states he feels great. Patient denies having any significant diarrhea but she did have little bit of loose bowel movements in the past 24 hours. She denies any problem chest pain, abdominal pain, nausea vomiting or unusual shortness of breath at rest.  BUN/creatinine was 8/0.5 serum potassium was increased to 3.2. Patient received 100 mEq of KCl IV and 80 mill equivalents of KCl p.o in the past 24 hours. Temperature is 98.4 with heart rate of 100 and blood pressure ranges 122//69.   With the patient having refractory hypokalemia we will consult nephrology. 5/9/2022  Patient seen examined on telemetry floor. Patient states she feels good again today. Patient states she is only having 3 loose bowel movements the past 2 days. Patient denies any chest or abdominal pain. She denies any shortness of breath at rest or with activity. BUN/creatinine 9/0.4 with potassium 5.4 today. Magnesium was 2.0. WBC increased to 24.1 with most likely from steroids. Hemoglobin 14.1 with platelet count of 519. Temperature is 97.5 with a heart rate of 91 and blood pressure changes 133/60 6-190 O2 sat 96% on 1 and half [id number] this morning. O2 saturation 96% on 1/2 L nasal cannula. O2 saturation walking 200 feet ranged from 92-94% on room air. We will stop the oral potassium and IV fluids at this time. Pending evaluation with nephrology today. Past Medical History:    Past Medical History:   Diagnosis Date    Bronchitis     Depression     GERD (gastroesophageal reflux disease)     Hyperlipidemia     Hypertension     Irritable bowel     Kidney stone     Migraines     Thyroid disease      Past Surgical History:    Past Surgical History:   Procedure Laterality Date    APPENDECTOMY      BACK SURGERY  2011    cervical disc surgery    HYSTERECTOMY      NERVE BLOCK Left 09/07/2016    sacroiliac joint #1    NERVE BLOCK Left 09/19/2016    si inj #2    NOSE SURGERY      bone removed d/t breathing    OTHER SURGICAL HISTORY Left 11/07/2016    sacroiliac joint radiofrequency    OTHER SURGICAL HISTORY Left 08/07/2017    radiofrequency, sacroiliac    SPINE SURGERY      SPLENECTOMY  age 23    d/t a tear etiology unknown    TONSILLECTOMY         Medications Prior to Admission:    @  Prior to Admission medications    Medication Sig Start Date End Date Taking?  Authorizing Provider   lisinopril (PRINIVIL;ZESTRIL) 20 MG tablet Take 1 tablet by mouth daily 5/10/22  Yes Bright Fields DO   predniSONE (DELTASONE) 10 MG tablet Take 3 tablets by mouth daily for 4 days 5/9/22 5/13/22 Yes Wendy Walter, DO   budesonide-formoterol [facility]) 80-4.5 MCG/ACT AERO Inhale 2 puffs into the lungs 2 times daily 5/9/22  Yes Wendy Walter, DO   tiotropium (SPIRIVA RESPIMAT) 2.5 MCG/ACT AERS inhaler Inhale 2 puffs into the lungs daily 5/9/22  Yes Wendy Walter, DO   albuterol sulfate HFA (PROVENTIL HFA) 108 (90 Base) MCG/ACT inhaler Inhale 2 puffs into the lungs every 4 hours as needed for Wheezing or Shortness of Breath 5/9/22  Yes Wendy Walter, DO   rizatriptan (MAXALT) 10 MG tablet Take 1 tablet by mouth once as needed for Migraine May repeat in 2 hours if needed 5/5/22 5/5/22  Markus Rater, DO   atorvastatin (LIPITOR) 20 MG tablet Take 1 tablet by mouth daily 5/5/22   Markus Rater, DO   omeprazole (PRILOSEC) 20 MG delayed release capsule Take 1 capsule by mouth daily 5/5/22   Markus Rater, DO   levothyroxine (SYNTHROID) 75 MCG tablet Take 1 tablet by mouth Daily 5/5/22   Markus Rater, DO   ibuprofen (ADVIL;MOTRIN) 400 MG tablet Take 1 tablet by mouth every 6 hours as needed for Pain 5/5/22   Markus Rater, DO       Allergies:   Iodides, Peanuts [peanut oil], and Penicillins    Social History:   Social History     Socioeconomic History    Marital status:      Spouse name: Not on file    Number of children: Not on file    Years of education: Not on file    Highest education level: Not on file   Occupational History    Not on file   Tobacco Use    Smoking status: Current Every Day Smoker     Packs/day: 0.50     Years: 50.00     Pack years: 25.00     Types: Cigarettes    Smokeless tobacco: Never Used   Substance and Sexual Activity    Alcohol use: No    Drug use: No    Sexual activity: Not Currently   Other Topics Concern    Not on file   Social History Narrative    Not on file     Social Determinants of Health     Financial Resource Strain: High Risk    Difficulty of Paying Living Expenses: Hard Food Insecurity: Food Insecurity Present    Worried About Running Out of Food in the Last Year: Often true    Luiz of Food in the Last Year: Sometimes true   Transportation Needs:     Lack of Transportation (Medical): Not on file    Lack of Transportation (Non-Medical): Not on file   Physical Activity:     Days of Exercise per Week: Not on file    Minutes of Exercise per Session: Not on file   Stress:     Feeling of Stress : Not on file   Social Connections:     Frequency of Communication with Friends and Family: Not on file    Frequency of Social Gatherings with Friends and Family: Not on file    Attends Hindu Services: Not on file    Active Member of [address] [place] [address] [employer] or Organizations: Not on file    Attends Club or Organization Meetings: Not on file    Marital Status: Not on file   Intimate Partner Violence:     Fear of Current or Ex-Partner: Not on file    Emotionally Abused: Not on file    Physically Abused: Not on file    Sexually Abused: Not on file   Housing Stability:     Unable to Pay for Housing in the Last Year: Not on file    Number of [place] in the Last Year: Not on file    Unstable Housing in the Last Year: Not on file       Family History:   Family History   Problem Relation Age of Onset    Arthritis Other     Heart Disease Other     High Blood Pressure Other        REVIEW OF SYSTEMS:    Gen: Patient denies any lightheadedness or dizziness. No LOC or syncope. No fevers or chills. HEENT: No earache, sore throat or nasal congestion. Resp: Denies cough, hemoptysis or sputum production. Cardiac: Denies chest pain,+ SOB, diaphoresis or palpitations. GI: No nausea, vomiting, diarrhea or constipation. No melena or hematochezia. : No urinary complaints, dysuria, hematuria or frequency. MSK: No extremity weakness, paralysis or paresthesias.      PHYSICAL EXAM:    Vitals:  BP (!) 172/80   Pulse 91   Temp 96.7 °F (35.9 °C) (Oral)   Resp 18   Ht 4' 9\" (1.448 m) Wt 105 lb (47.6 kg)   SpO2 92%   BMI 22.72 kg/m²     General:  This is a 78 y.o. yo female who is alert and oriented in NAD  HEENT:  Head is normocephalic and atraumatic, PERRLA, EOMI, mucus membranes moist with no pharyngeal erythema or exudate. Neck:  Supple with no carotid bruits, JVD or thyromegaly.   No cervical adenopathy  CV:  Regular rate and rhythm, no murmurs  Lungs: Coarse decreased breath sounds to auscultation bilaterally mild scattered rhonchi without wheezes, no rales   Abdomen:  Soft, nontender, nondistended, bowel sounds present  Extremities:  No edema, peripheral pulses intact bilaterally  Neuro:  Cranial nerves II-XII grossly intact; motor and sensory function intact with no focal deficits  Skin:  No rashes, lesions or wounds    DATA:  CBC with Differential:    Lab Results   Component Value Date    WBC 24.1 05/09/2022    RBC 4.66 05/09/2022    HGB 14.1 05/09/2022    HCT 43.0 05/09/2022     05/09/2022    MCV 92.3 05/09/2022    MCH 30.3 05/09/2022    MCHC 32.8 05/09/2022    RDW 14.6 05/09/2022    LYMPHOPCT 3.5 05/09/2022    MONOPCT 3.1 05/09/2022    BASOPCT 0.2 05/09/2022    MONOSABS 0.00 05/09/2022    LYMPHSABS 0.96 05/09/2022    EOSABS 0.00 05/09/2022    BASOSABS 0.00 05/09/2022     CMP:    Lab Results   Component Value Date     05/10/2022    K 4.4 05/10/2022    K 4.2 05/08/2022    CL 99 05/10/2022    CO2 24 05/10/2022    BUN 13 05/10/2022    CREATININE 0.5 05/10/2022    GFRAA >60 05/10/2022    LABGLOM >60 05/10/2022    GLUCOSE 82 05/10/2022    PROT 7.1 05/07/2022    LABALBU 3.8 05/07/2022    CALCIUM 9.8 05/10/2022    BILITOT 0.7 05/07/2022    ALKPHOS 104 05/07/2022    AST 28 05/07/2022    ALT 21 05/07/2022     Magnesium:    Lab Results   Component Value Date    MG 1.9 05/10/2022     Phosphorus:    Lab Results   Component Value Date    PHOS 3.2 05/07/2022     PT/INR:  No results found for: PROTIME, INR  Troponin:    Lab Results   Component Value Date    TROPONINI <0.01 04/01/2018 U/A:    Lab Results   Component Value Date    COLORU Yellow 04/01/2018    PROTEINU TRACE 04/01/2018    PHUR 8.0 04/01/2018    WBCUA 0-1 04/01/2018    RBCUA 2-5 04/01/2018    BACTERIA NONE 04/01/2018    CLARITYU Clear 04/01/2018    SPECGRAV 1.010 04/01/2018    LEUKOCYTESUR Negative 04/01/2018    UROBILINOGEN 0.2 04/01/2018    BILIRUBINUR Negative 04/01/2018    BLOODU MODERATE 04/01/2018    GLUCOSEU Negative 04/01/2018     ABG:  No results found for: PH, PCO2, PO2, HCO3, BE, THGB, TCO2, O2SAT  HgBA1c:  No results found for: LABA1C  FLP:    Lab Results   Component Value Date    TRIG 133 05/07/2022    HDL 58 05/07/2022    LDLCALC 89 05/07/2022    LABVLDL 27 05/07/2022     TSH:    Lab Results   Component Value Date    TSH 2.610 05/07/2022     IRON:  No results found for: IRON  LIPASE:    Lab Results   Component Value Date    LIPASE 14 04/01/2018       ASSESSMENT AND PLAN:      Patient Active Problem List    Diagnosis Date Noted    Sacroiliitis  08/08/2016    COPD exacerbation ([address] 05/06/2022    Acquired hypothyroidism 05/05/2022    Essential hypertension 05/05/2022    Chronic pain of both shoulders 05/05/2022    GERD without esophagitis 05/05/2022    Acute respiratory failure with hypoxia ([address] 04/01/2018    DDD (degenerative disc disease), cervical 08/08/2016    Status post cervical spinal fusion 08/08/2016    Cervical osteoarthritis 08/08/2016    DDD (degenerative disc disease), lumbar 08/08/2016    Facet syndrome, lumbar 08/08/2016     Impression:  1. Severe hypokalemia  2. Acute hypoxic respiratory failure with COPD exacerbation and current tobacco abuse  3. History hypertension  4. History hyperlipidemia  5. History hypothyroidism  6. History of depression  7. Chronic diarrhea-3 bowel movements a day in the a.m. Plan:  Admit to monitored bed  Home medication reviewed  Activity up ad valentina.   General diet  Lovenox 40 mg subcu daily  KCl 20 milliequivalents p.o. 4 times daily  Serum magnesium -2.7  Hold hydrochlorothiazide  DuoNeb aerosols 4 times daily  Pulmicort aerosol twice daily  O2 saturation on room air at rest and if greater than 89% with activity  O2 nasal cannula as needed    IV fluids normal saline with 40 KCl at 100 cc an hour  Solu-Medrol 40 mg IV push every 8 hours  Repeat BMP 4 PM today  Consult nephrology with patient having refractory hypokalemia    Hold oral potassium today and IV fluids  Lasix 20 mg IV push x1  Repeat BMP 6 PM today    BMP, CBC and magnesium in a.m.     Ruben Taveras DO, D.OHaily  5/9/2022

## 2022-05-10 NOTE — PROGRESS NOTES
Nephrology Note  Ruby Rizzo MD          5/10/22: Patient seen and examined - Per the medical record no acute events overnight - currently up and about in room -no specific complaints - anticipating discharge soon - no family present       Vital SignsBP (!) 172/80   Pulse 91   Temp 96.7 °F (35.9 °C) (Oral)   Resp 18   Ht 4' 9\" (1.448 m)   Wt 105 lb (47.6 kg)   SpO2 92%   BMI 22.72 kg/m²   24HR INTAKE/OUTPUT:      Intake/Output Summary (Last 24 hours) at 5/10/2022 1249  Last data filed at 5/9/2022 1340  Gross per 24 hour   Intake 0 ml   Output --   Net 0 ml         Physical Exam    Neck: No JVD  Lungs: Breath sounds decreased at the bases. Bilateral coarse breath sounds with scattered expiratory rhonchi. Heart: Regular rate and rhythm. No S3 gallop. No  murmrur. Abdomen: Soft non distended, non tender and normal bowel sounds. Extremeties: No edema.           Current Facility-Administered Medications   Medication Dose Route Frequency Provider Last Rate Last Admin    budesonide-formoterol (SYMBICORT) 80-4.5 MCG/ACT inhaler 2 puff  2 puff Inhalation BID Gearldine , DO        predniSONE (DELTASONE) tablet 30 mg  30 mg Oral Daily Gearldine , DO   30 mg at 05/10/22 3371    atorvastatin (LIPITOR) tablet 20 mg  20 mg Oral Daily Gearldine , DO   20 mg at 05/10/22 3792    pantoprazole (PROTONIX) tablet 40 mg  40 mg Oral QAM AC Arcenio Diaz, DO   40 mg at 05/10/22 0513    levothyroxine (SYNTHROID) tablet 75 mcg  75 mcg Oral Daily Gearldine , DO   75 mcg at 05/10/22 0513    ibuprofen (ADVIL;MOTRIN) tablet 400 mg  400 mg Oral Q6H PRN Gearldine , DO   400 mg at 05/10/22 0520    acetaminophen (TYLENOL) tablet 500 mg  500 mg Oral Q6H PRN Gearldine , DO   500 mg at 05/09/22 1118    ipratropium-albuterol (DUONEB) nebulizer solution 1 ampule  1 ampule Inhalation Q4H WA Gearldine , DO   1 ampule at 05/10/22 1016    prochlorperazine (COMPAZINE) injection 5 mg  5 mg IntraVENous Q6H PRN Thu Round, DO        polyethylene glycol (GLYCOLAX) packet 17 g  17 g Oral Daily PRN Thu Round, DO        lisinopril (PRINIVIL;ZESTRIL) tablet 5 mg  5 mg Oral Daily Thu Round, DO   5 mg at 05/10/22 4905    enoxaparin Sodium (LOVENOX) injection 30 mg  30 mg SubCUTAneous Daily Thu Round, DO   30 mg at 05/10/22 0278       CT CHEST WO CONTRAST   Final Result   Chronic changes again noted of upper lobe predominant emphysema. There is   subpleural reticulation and questionable honeycombing development which may   be progressed from remote comparison 2018 in the lower lungs right greater   than left with intervening or intermixed opacifications at the right lung   base somewhat less diffuse on prior comparison where these are present with   more focal areas of nodular densities or confluent opacities 18 mm and 17 mm   respectively. While these may represent inflammatory nodules close interval   attention on follow-up to exclude underlying or status or etiology such as   malignancy. Labs:    CBC:   Recent Labs     05/09/22  0609   WBC 24.1*   HGB 14.1   *        No results found for: IRON, TIBC, FERRITIN    Lab Results   Component Value Date    CALCIUM 9.8 05/10/2022    CALCIUM 9.4 05/09/2022    CALCIUM 9.4 05/09/2022    PHOS 3.2 05/07/2022    MG 1.9 05/10/2022    MG 2.0 05/09/2022    MG 2.7 (H) 05/06/2022       BMP:   Recent Labs     05/09/22  0609 05/09/22  1812 05/10/22  0829    131* 135   K 5.4* 4.8 4.4    101 99   CO2 19* 20* 24   BUN 9 11 13   CREATININE 0.4* 0.4* 0.5   GLUCOSE 110* 162* 82           Assessment: / Plan:    1. Hypokalemia. Hypokalemia in this patient upon presentation secondary to diuretics and diarrhea (chronic diarrhea - 3 bowel movements a day) - may have been further exacerbated by use of steroids - Labs from this morning show K+ of 4.8 - Mg level of 2.0 - follow      2. Benign essential hypertension.   Blood pressure has been fluctuating - target is less than 130/80 mmHg - will plan for cautious increase in the lisinopril - continue to avoid diuretic (HCTZ) as this time - can be followed in the OP setting as patient likely for discharge this afternoon     3. Hyponatremia. Patient most likely with hypovolemic hyponatremia - has received IVF w/ improvement           [facility]    Patient seen and examined. Chart reviewed. I had a face to face encounter with the patient. Agree with exam.    Agree with  formulation, assessment and plan as outlined above and directed by me. Addition and corrections were done as deemed appropriate. My exam and plan include:     Continue current treatment as outlined above. Marya Jovel to discharge from a renal standpoint. Discussed with Dr. Doris Lerner. Patient advised to get outpatient labs diligently.          Serafin Lyles MD  Nephrology        Electronically signed by Serafin Lyles MD on 5/10/2022 at 3:03 PM

## 2022-05-11 ENCOUNTER — CARE COORDINATION (OUTPATIENT)
Dept: CASE MANAGEMENT | Age: 77
End: 2022-05-11

## 2022-05-11 NOTE — CARE COORDINATION
[facility] Transitions Initial Follow Up Call    Call within 2 business days of discharge: Yes    Patient: Jesika Diego Patient : 1945   MRN: [id number]  Reason for Admission: COPD exacerbation and Hypokalemia  Discharge Date: 5/10/22 RARS: Readmission Risk Score: 8.5 ( )      Last Discharge [facility]       Complaint Diagnosis Description Type Department Provider    22 Abnormal Lab COPD exacerbation ([facility] [address] . .. ED to Hosp-Admission (Discharged) (ADMITTED) [address], DO; Cl Leiva. .. Attempted to contact patient today 22 for TCM/hospital discharge follow up. Left message on home/mobile number requesting a return call back to CTN and provided contact inforamtion.       Tiffani Desai, APRN

## 2022-05-12 ENCOUNTER — CARE COORDINATION (OUTPATIENT)
Dept: CASE MANAGEMENT | Age: 77
End: 2022-05-12

## 2022-05-12 DIAGNOSIS — J44.1 COPD EXACERBATION (HCC): Primary | ICD-10-CM

## 2022-05-12 PROCEDURE — 1111F DSCHRG MED/CURRENT MED MERGE: CPT | Performed by: FAMILY MEDICINE

## 2022-05-12 NOTE — CARE COORDINATION
[facility] Transitions Initial Follow Up Call    Call within 2 business days of discharge: Yes    Patient: Hardik Clark Patient : 1945   MRN: [id number]  Reason for Admission: COPD exacerbation and Hypokalemia  Discharge Date: 5/10/22 RARS: Readmission Risk Score: 8.5 ( )      Last Discharge [facility]       Complaint Diagnosis Description Type Department Provider    22 Abnormal Lab COPD exacerbation ([facility] [address] . .. ED to Hosp-Admission (Discharged) (ADMITTED) [address], DO; Christo Boo. .. Transitions of Care Initial Call    Was this an external facility discharge? No Discharge Facility: [facility]    Challenges to be reviewed by the provider   Additional needs identified to be addressed with provider: No  none             Method of communication with provider : none    Advance Care Planning:   Does patient have an Advance Directive: reviewed and current. Care Transition Nurse (CTN) contacted the patient by telephone to perform post hospital discharge assessment. Verified name and  with patient as identifiers. Provided introduction to self, and explanation of the CTN role. CTN reviewed discharge instructions, medical action plan and red flags with patient who verbalized understanding. Patient given an opportunity to ask questions and does not have any further questions or concerns at this time. Were discharge instructions available to patient? Yes. Reviewed appropriate site of care based on symptoms and resources available to patient including: PCP  Urgent care clinics  When to call 911  Condition related references. The patient agrees to contact the PCP office for questions related to their healthcare. Medication reconciliation was performed with patient, who verbalizes understanding of administration of home medications. Advised obtaining a 90-day supply of all daily and as-needed medications. Was patient discharged with a pulse oximeter?  no    CTN provided contact information. Plan for follow-up call in 5-7 days based on severity of symptoms and risk factors. Non-face-to-face services provided:  Scheduled appointment with PCP-CTN confirmed with patient she is scheudled to follow up with Dr. Barb Dan (PCP) on 5/19/22 at 12:45 pm  Obtained and reviewed discharge summary and/or continuity of care documents  Education of patient/family/caregiver/guardian to support self-management-Discussed COPD zone tool and knowing when to seek medical attention. Assessment and support for treatment adherence and medication management-Advised of impirtance to take Prednisone as directed until completly finished. Care Transitions 24 Hour Call    Do you have a copy of your discharge instructions?: Yes  Do you have all of your prescriptions and are they filled?: Yes  Have you been contacted by a [address]?: No  Have you scheduled your follow up appointment?: Yes  How are you going to get to your appointment?: Car - family or friend to transport  Do you have support at home?: Partner/Spouse/SO  Do you feel like you have everything you need to keep you well at home?: Yes  Care Transitions Interventions    Smoking Cessation: Declined         Spoke with patient today 5/12/22 for TCM/hospital discharge follow up. Patient states she is feeling better since discharge and offers no complaints. Denies any shortness of breath, chest pain, chest discomfort, abdominal pain, nausea, vomiting, chills or fever. Noted CT of chest obtained on admission showed the following below:     Chronic changes again noted of upper lobe predominant emphysema.  There is   subpleural reticulation and questionable honeycombing development which may   be progressed from remote comparison 2018 in the lower lungs right greater   than left with intervening or intermixed opacifications at the right lung   base somewhat less diffuse on prior comparison where these are present with   more focal areas of nodular densities or confluent opacities 18 mm and 17 mm   respectively.  While these may represent inflammatory nodules close interval   attention on follow-up to exclude underlying or status or etiology such as   malignancy. Provided a complete review of home meds with patient who confirms she obtained new meds ordered on discharge. Advised to take Prednisone as directed until completely finished. 1111F code entered. Patient admits smoking on occasion. Denies interest in smoking cessation or NRT. Advised of importance to quit smoking. Reviewed COPD zone tool and knowing when to seek medical attention. Confirmed with patient she is scheduled to follow up with Dr. Felicitas Rios (PCP) on 5/19/22 at 12:45 pm. 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 Denies any complaints or needs at this time. CTN will continue to follow for Care Transition.      Follow Up  Future Appointments   Date Time Provider Gloria Hyde   5/19/2022 12:45 PM DO Kathia Delacruz [facility]   8/4/2022  9:45 AM DO Kathia Delacruz [facility]   8/4/2022 10:00 AM DO Kathia Delacruz [facility]       SHAN Mart

## 2022-05-19 ENCOUNTER — CARE COORDINATION (OUTPATIENT)
Dept: CASE MANAGEMENT | Age: 77
End: 2022-05-19

## 2022-05-19 NOTE — CARE COORDINATION
Care Transitions Follow Up Call    Needs to be reviewed by the provider   Additional needs identified to be addressed with provider: No  none             Method of communication with provider : none      Care Transition Nurse contacted the patient by telephone to follow up after admission on 5/10/22. Verified name and  with patient as identifiers. Addressed changes since last contact: none  Discussed follow-up appointments. If no appointment was previously scheduled, appointment scheduling offered: Yes. Is follow up appointment scheduled within 7 days of discharge? No and CTN confirmed with patient she canceled HFU appt with Dr. Georgi Read (PCP) that was scheduled for today saying \" I didn't feel like it and having my  drive me\". Advance Care Planning:   Does patient have an Advance Directive: reviewed and current. CTN reviewed discharge instructions, medical action plan and red flags with patient and discussed any barriers to care and/or understanding of plan of care after discharge. Discussed appropriate site of care based on symptoms and resources available to patient including: PCP  Urgent care clinics  When to call 911  Condition related references. The patient agrees to contact the PCP office for questions related to their healthcare. Patients top risk factors for readmission: lack of knowledge about disease  level of motivation  medical condition-COPD, acute respiratory failure and Tobacco Abuse  polypharmacy    CTN provided contact information for future needs. Plan for follow-up call in 7-10 days based on severity of symptoms and risk factors. Spoke with patient today 22 for TCM/hospital discharge follow up sub call. Patient states she continues doing well and offers no complaints. Denies any shortness of breath, chest pain or chest discomfort. Patient states she continues on Prednisone that was ordered on discharge.  States having 2 gracia doses of Prednisone left admitting she skipped some doses. Patient admits she continues smoking and is aware of the risk involved if she keeps smoking and has been advised to quit d/t COPD. Patient states she canceled HFU appt with Dr. Summer Preciado (PCP) that was scheduled for today saying saying \" I didn't feel like it and having my  drive me\". Patient declines wanting to reschedule appt at this time. CTN reiterated COPD zone tool and knowing when to seek medical attention. Denies any complaints or needs at this time. CTN will continue to follow for Care Transition.

## 2022-05-27 ENCOUNTER — CARE COORDINATION (OUTPATIENT)
Dept: CASE MANAGEMENT | Age: 77
End: 2022-05-27

## 2022-05-27 NOTE — CARE COORDINATION
[facility] Transitions Follow Up Call    2022    Patient: Eben Bermeo  Patient : 1945   MRN: [id number]  Reason for Admission:   Discharge Date: 5/10/22 RARS: Readmission Risk Score: 8.5 ( )       Attempted to reach patient by phone regarding follow up;Subsequent call, Care Transitions. HIPAA compliant message left on patient's voicemail; CTN contact information provided. Please contact your PCP for any immediate needs or concerns.     Follow Up  Future Appointments   Date Time Provider Gloria Hyde   2022  9:45 AM DO Kathia Larose [facility]   2022 10:00 AM DO Kathia Larose RN

## 2022-06-03 ENCOUNTER — CARE COORDINATION (OUTPATIENT)
Dept: CASE MANAGEMENT | Age: 77
End: 2022-06-03

## 2022-06-03 NOTE — CARE COORDINATION
[facility] Transitions Follow Up Call    6/3/2022    Patient: Eben Bermeo  Patient : 1945   MRN: [id number]  Reason for Admission: COPD exacerbation/Hypokalemia  Discharge Date: 5/10/22 RARS: Readmission Risk Score: 8.5 ( )    Second attempt made today 6/3/22 to contact patient for TCM/hospital discharge sub call. Left message on home/mobile number requesting a return call back to CTN and provided contact information. CTN signing off if no return call.       Oni Cash, APRN

## 2022-10-18 ENCOUNTER — OFFICE VISIT (OUTPATIENT)
Dept: FAMILY MEDICINE CLINIC | Age: 77
End: 2022-10-18
Payer: MEDICARE

## 2022-10-18 VITALS
WEIGHT: 114.6 LBS | HEART RATE: 92 BPM | TEMPERATURE: 97.7 F | OXYGEN SATURATION: 92 % | HEIGHT: 57 IN | RESPIRATION RATE: 16 BRPM | DIASTOLIC BLOOD PRESSURE: 60 MMHG | SYSTOLIC BLOOD PRESSURE: 92 MMHG | BODY MASS INDEX: 24.72 KG/M2

## 2022-10-18 DIAGNOSIS — K21.9 GERD WITHOUT ESOPHAGITIS: ICD-10-CM

## 2022-10-18 DIAGNOSIS — F17.210 TOBACCO DEPENDENCE DUE TO CIGARETTES: ICD-10-CM

## 2022-10-18 DIAGNOSIS — G43.009 MIGRAINE WITHOUT AURA AND WITHOUT STATUS MIGRAINOSUS, NOT INTRACTABLE: ICD-10-CM

## 2022-10-18 DIAGNOSIS — E78.00 HYPERCHOLESTEROLEMIA: ICD-10-CM

## 2022-10-18 DIAGNOSIS — E03.9 ACQUIRED HYPOTHYROIDISM: ICD-10-CM

## 2022-10-18 DIAGNOSIS — I10 ESSENTIAL HYPERTENSION: Primary | ICD-10-CM

## 2022-10-18 PROCEDURE — 1123F ACP DISCUSS/DSCN MKR DOCD: CPT | Performed by: FAMILY MEDICINE

## 2022-10-18 PROCEDURE — G8420 CALC BMI NORM PARAMETERS: HCPCS | Performed by: FAMILY MEDICINE

## 2022-10-18 PROCEDURE — 4004F PT TOBACCO SCREEN RCVD TLK: CPT | Performed by: FAMILY MEDICINE

## 2022-10-18 PROCEDURE — G8427 DOCREV CUR MEDS BY ELIG CLIN: HCPCS | Performed by: FAMILY MEDICINE

## 2022-10-18 PROCEDURE — G8400 PT W/DXA NO RESULTS DOC: HCPCS | Performed by: FAMILY MEDICINE

## 2022-10-18 PROCEDURE — 1090F PRES/ABSN URINE INCON ASSESS: CPT | Performed by: FAMILY MEDICINE

## 2022-10-18 PROCEDURE — G8484 FLU IMMUNIZE NO ADMIN: HCPCS | Performed by: FAMILY MEDICINE

## 2022-10-18 PROCEDURE — 99214 OFFICE O/P EST MOD 30 MIN: CPT | Performed by: FAMILY MEDICINE

## 2022-10-18 PROCEDURE — 3017F COLORECTAL CA SCREEN DOC REV: CPT | Performed by: FAMILY MEDICINE

## 2022-10-18 RX ORDER — AMLODIPINE BESYLATE 5 MG/1
5 TABLET ORAL DAILY
Qty: 90 TABLET | Refills: 1 | Status: SHIPPED | OUTPATIENT
Start: 2022-10-18

## 2022-10-18 RX ORDER — LEVOTHYROXINE SODIUM 0.07 MG/1
75 TABLET ORAL DAILY
Qty: 90 TABLET | Refills: 1 | Status: SHIPPED | OUTPATIENT
Start: 2022-10-18

## 2022-10-18 RX ORDER — IBUPROFEN 400 MG/1
400 TABLET ORAL EVERY 6 HOURS PRN
Qty: 90 TABLET | Refills: 2 | Status: SHIPPED | OUTPATIENT
Start: 2022-10-18

## 2022-10-18 RX ORDER — LISINOPRIL AND HYDROCHLOROTHIAZIDE 25; 20 MG/1; MG/1
TABLET ORAL
Qty: 90 TABLET | Refills: 1 | Status: SHIPPED | OUTPATIENT
Start: 2022-10-18

## 2022-10-18 RX ORDER — LISINOPRIL AND HYDROCHLOROTHIAZIDE 25; 20 MG/1; MG/1
TABLET ORAL
COMMUNITY
Start: 2022-08-03 | End: 2022-10-18 | Stop reason: SDUPTHER

## 2022-10-18 RX ORDER — AMLODIPINE BESYLATE 5 MG/1
5 TABLET ORAL DAILY
COMMUNITY
End: 2022-10-18 | Stop reason: SDUPTHER

## 2022-10-18 RX ORDER — ATORVASTATIN CALCIUM 20 MG/1
20 TABLET, FILM COATED ORAL DAILY
Qty: 90 TABLET | Refills: 1 | Status: SHIPPED | OUTPATIENT
Start: 2022-10-18

## 2022-10-18 RX ORDER — RIZATRIPTAN BENZOATE 10 MG/1
10 TABLET ORAL
Qty: 20 TABLET | Refills: 1 | Status: SHIPPED | OUTPATIENT
Start: 2022-10-18 | End: 2022-10-18

## 2022-10-18 RX ORDER — OMEPRAZOLE 20 MG/1
20 CAPSULE, DELAYED RELEASE ORAL DAILY
Qty: 90 CAPSULE | Refills: 1 | Status: SHIPPED | OUTPATIENT
Start: 2022-10-18

## 2022-10-18 NOTE — PROGRESS NOTES
10/19/2022    [facility]    Chief Complaint   Patient presents with    Hypothyroidism     Patient here for check up. Needs refills. HPI  History was obtained from patient. Yesenia Lazcano is a 78 y.o. female who presents today with the followin. Essential hypertension    2. Migraine without aura and without status migrainosus, not intractable    3. GERD without esophagitis    4. Acquired hypothyroidism    5. Tobacco dependence due to cigarettes    6. Hypercholesterolemia    Patient is here for follow-up of chronic medical problems. She has no complaints today. Patient has been taking her medications as prescribed with the exception of her inhalers which she has discontinued all of them saying that she does not need them. Patient states she can walk up a flight of steps without getting short of breath and does not notice any wheezing. Continues to smoke half pack cigarettes a day. REVIEW OF SYMPTOMS    Review of Systems   Constitutional:  Negative for chills, fatigue and fever. HENT:  Negative for congestion, mouth sores, postnasal drip, rhinorrhea and sinus pain. Eyes:  Negative for photophobia, discharge and redness. Respiratory:  Negative for cough and shortness of breath. Cardiovascular:  Negative for chest pain. Gastrointestinal: Negative. Genitourinary:  Negative for difficulty urinating, dysuria, hematuria and urgency. Neurological:  Negative for headaches. Psychiatric/Behavioral:  Negative for sleep disturbance.       PAST MEDICAL HISTORY  Past Medical History:   Diagnosis Date    Bronchitis     Depression     GERD (gastroesophageal reflux disease)     Hyperlipidemia     Hypertension     Irritable bowel     Kidney stone     Migraines     Thyroid disease        FAMILY HISTORY  Family History   Problem Relation Age of Onset    Arthritis Other     Heart Disease Other     High Blood Pressure Other        SOCIAL HISTORY  Social History     Socioeconomic History    Marital status:      Spouse name: None    Number of children: None    Years of education: None    Highest education level: None   Tobacco Use    Smoking status: Every Day     Packs/day: 0.50     Years: 50.00     Pack years: 25.00     Types: Cigarettes    Smokeless tobacco: Never   Substance and Sexual Activity    Alcohol use: No    Drug use: No    Sexual activity: Not Currently        SURGICAL HISTORY  Past Surgical History:   Procedure Laterality Date    APPENDECTOMY      BACK SURGERY  2011    cervical disc surgery    HYSTERECTOMY (CERVIX STATUS UNKNOWN)      NERVE BLOCK Left 09/07/2016    sacroiliac joint #1    NERVE BLOCK Left 09/19/2016    si inj #2    NOSE SURGERY      bone removed d/t breathing    OTHER SURGICAL HISTORY Left 11/07/2016    sacroiliac joint radiofrequency    OTHER SURGICAL HISTORY Left 08/07/2017    radiofrequency, sacroiliac    SPINE SURGERY      SPLENECTOMY  age 23    d/t a tear etiology unknown    TONSILLECTOMY                   CURRENT MEDICATIONS  Current Outpatient Medications   Medication Sig Dispense Refill    rizatriptan (MAXALT) 10 MG tablet Take 1 tablet by mouth once as needed for Migraine May repeat in 2 hours if needed 20 tablet 1    omeprazole (PRILOSEC) 20 MG delayed release capsule Take 1 capsule by mouth daily 90 capsule 1    levothyroxine (SYNTHROID) 75 MCG tablet Take 1 tablet by mouth Daily 90 tablet 1    ibuprofen (ADVIL;MOTRIN) 400 MG tablet Take 1 tablet by mouth every 6 hours as needed for Pain 90 tablet 2    atorvastatin (LIPITOR) 20 MG tablet Take 1 tablet by mouth daily 90 tablet 1    lisinopril-hydroCHLOROthiazide (PRINZIDE;ZESTORETIC) 20-25 MG per tablet take 1 tablet by mouth once daily 90 tablet 1    amLODIPine (NORVASC) 5 MG tablet Take 1 tablet by mouth daily 90 tablet 1     No current facility-administered medications for this visit.        ALLERGIES  Allergies   Allergen Reactions    Iodides Itching     IVP dye    Peanuts [Peanut Oil] Other (See Comments)     migraines Penicillins      Doesn't recall reaction       PHYSICAL EXAM    BP 92/60   Pulse 92   Temp 97.7 °F (36.5 °C)   Resp 16   Ht 4' 9\" (1.448 m)   Wt 114 lb 9.6 oz (52 kg)   SpO2 92%   BMI 24.80 kg/m²     Physical Exam  Vitals and nursing note reviewed. Constitutional:       Appearance: Normal appearance. HENT:      Nose: No congestion or rhinorrhea. Mouth/Throat:      Mouth: Mucous membranes are moist.      Pharynx: Oropharynx is clear. Eyes:      General: No scleral icterus. Conjunctiva/sclera: Conjunctivae normal.   Cardiovascular:      Rate and Rhythm: Normal rate and regular rhythm. Heart sounds: Normal heart sounds. Pulmonary:      Effort: Pulmonary effort is normal.      Breath sounds: Normal breath sounds. Musculoskeletal:      Cervical back: Neck supple. Skin:     General: Skin is warm. Neurological:      Mental Status: She is alert and oriented to person, place, and time. Psychiatric:         Mood and Affect: Mood normal.       ASSESSMENT & PLAN    Cynthia Guaman was seen today for hypothyroidism. Diagnoses and all orders for this visit:    Essential hypertension  -     lisinopril-hydroCHLOROthiazide (PRINZIDE;ZESTORETIC) 20-25 MG per tablet; take 1 tablet by mouth once daily  -     amLODIPine (NORVASC) 5 MG tablet; Take 1 tablet by mouth daily  Patient's blood pressure is well controlled on her current medication. Migraine without aura and without status migrainosus, not intractable  -     rizatriptan (MAXALT) 10 MG tablet; Take 1 tablet by mouth once as needed for Migraine May repeat in 2 hours if needed  -     ibuprofen (ADVIL;MOTRIN) 400 MG tablet; Take 1 tablet by mouth every 6 hours as needed for Pain  Patient states he only needs her Maxalt a couple times a month for migraines. She states for minor headaches she just uses ibuprofen    GERD without esophagitis  -     omeprazole (PRILOSEC) 20 MG delayed release capsule;  Take 1 capsule by mouth daily  Patient's reflux symptoms are well controlled with Prilosec. Acquired hypothyroidism  -     levothyroxine (SYNTHROID) 75 MCG tablet; Take 1 tablet by mouth Daily  Patient's most recent TSH was 2.61 in May of this year. Patient will continue with her Synthroid 75 mcg daily. Tobacco dependence due to cigarettes    Hypercholesterolemia  -     atorvastatin (LIPITOR) 20 MG tablet; Take 1 tablet by mouth daily  Patient's most recent LDL cholesterol was 89 in May of this year patient will continue with Lipitor 20 mg. Return in about 3 months (around 1/18/2023). Electronically signed by David Morley.  Atif Cole DO on 10/19/2022

## 2022-10-19 ASSESSMENT — ENCOUNTER SYMPTOMS
SHORTNESS OF BREATH: 0
COUGH: 0
PHOTOPHOBIA: 0
GASTROINTESTINAL NEGATIVE: 1
EYE DISCHARGE: 0
SINUS PAIN: 0
EYE REDNESS: 0
RHINORRHEA: 0

## 2023-01-17 ENCOUNTER — COMMUNITY OUTREACH (OUTPATIENT)
Dept: FAMILY MEDICINE CLINIC | Age: 78
End: 2023-01-17

## 2023-04-26 DIAGNOSIS — E78.00 HYPERCHOLESTEROLEMIA: ICD-10-CM

## 2023-04-26 DIAGNOSIS — E03.9 ACQUIRED HYPOTHYROIDISM: ICD-10-CM

## 2023-04-26 DIAGNOSIS — K21.9 GERD WITHOUT ESOPHAGITIS: ICD-10-CM

## 2023-04-26 DIAGNOSIS — I10 ESSENTIAL HYPERTENSION: ICD-10-CM

## 2023-04-26 RX ORDER — OMEPRAZOLE 20 MG/1
CAPSULE, DELAYED RELEASE ORAL
Qty: 90 CAPSULE | Refills: 0 | OUTPATIENT
Start: 2023-04-26

## 2023-04-26 RX ORDER — ATORVASTATIN CALCIUM 20 MG/1
TABLET, FILM COATED ORAL
Qty: 90 TABLET | Refills: 0 | OUTPATIENT
Start: 2023-04-26

## 2023-04-26 RX ORDER — LISINOPRIL AND HYDROCHLOROTHIAZIDE 25; 20 MG/1; MG/1
TABLET ORAL
Qty: 90 TABLET | Refills: 0 | OUTPATIENT
Start: 2023-04-26

## 2023-04-26 RX ORDER — LEVOTHYROXINE SODIUM 0.07 MG/1
TABLET ORAL
Qty: 90 TABLET | Refills: 0 | OUTPATIENT
Start: 2023-04-26

## 2023-04-26 RX ORDER — AMLODIPINE BESYLATE 5 MG/1
TABLET ORAL
Qty: 90 TABLET | Refills: 0 | OUTPATIENT
Start: 2023-04-26

## 2023-05-09 DIAGNOSIS — E78.00 HYPERCHOLESTEROLEMIA: ICD-10-CM

## 2023-05-09 DIAGNOSIS — E03.9 ACQUIRED HYPOTHYROIDISM: ICD-10-CM

## 2023-05-09 DIAGNOSIS — I10 ESSENTIAL HYPERTENSION: ICD-10-CM

## 2023-05-09 DIAGNOSIS — K21.9 GERD WITHOUT ESOPHAGITIS: ICD-10-CM

## 2023-05-09 RX ORDER — LEVOTHYROXINE SODIUM 0.07 MG/1
TABLET ORAL
Qty: 90 TABLET | Refills: 1 | OUTPATIENT
Start: 2023-05-09

## 2023-05-09 RX ORDER — OMEPRAZOLE 20 MG/1
CAPSULE, DELAYED RELEASE ORAL
Qty: 90 CAPSULE | Refills: 1 | OUTPATIENT
Start: 2023-05-09

## 2023-05-09 RX ORDER — ATORVASTATIN CALCIUM 20 MG/1
TABLET, FILM COATED ORAL
Qty: 90 TABLET | Refills: 1 | OUTPATIENT
Start: 2023-05-09

## 2023-05-09 RX ORDER — AMLODIPINE BESYLATE 5 MG/1
TABLET ORAL
Qty: 90 TABLET | Refills: 1 | OUTPATIENT
Start: 2023-05-09

## 2023-05-09 RX ORDER — LISINOPRIL AND HYDROCHLOROTHIAZIDE 25; 20 MG/1; MG/1
TABLET ORAL
Qty: 90 TABLET | Refills: 1 | OUTPATIENT
Start: 2023-05-09

## 2023-05-25 DIAGNOSIS — I10 ESSENTIAL HYPERTENSION: ICD-10-CM

## 2023-05-25 DIAGNOSIS — K21.9 GERD WITHOUT ESOPHAGITIS: ICD-10-CM

## 2023-05-25 DIAGNOSIS — E03.9 ACQUIRED HYPOTHYROIDISM: ICD-10-CM

## 2023-05-25 DIAGNOSIS — E78.00 HYPERCHOLESTEROLEMIA: ICD-10-CM

## 2023-05-25 RX ORDER — OMEPRAZOLE 20 MG/1
CAPSULE, DELAYED RELEASE ORAL
Qty: 90 CAPSULE | Refills: 1 | OUTPATIENT
Start: 2023-05-25

## 2023-05-25 RX ORDER — ATORVASTATIN CALCIUM 20 MG/1
TABLET, FILM COATED ORAL
Qty: 90 TABLET | Refills: 1 | OUTPATIENT
Start: 2023-05-25

## 2023-05-25 RX ORDER — LISINOPRIL AND HYDROCHLOROTHIAZIDE 25; 20 MG/1; MG/1
TABLET ORAL
Qty: 90 TABLET | Refills: 1 | OUTPATIENT
Start: 2023-05-25

## 2023-05-25 RX ORDER — LEVOTHYROXINE SODIUM 0.07 MG/1
TABLET ORAL
Qty: 90 TABLET | Refills: 1 | OUTPATIENT
Start: 2023-05-25

## 2023-05-25 RX ORDER — AMLODIPINE BESYLATE 5 MG/1
TABLET ORAL
Qty: 90 TABLET | Refills: 1 | OUTPATIENT
Start: 2023-05-25

## 2023-06-01 DIAGNOSIS — K21.9 GERD WITHOUT ESOPHAGITIS: ICD-10-CM

## 2023-06-01 DIAGNOSIS — E03.9 ACQUIRED HYPOTHYROIDISM: ICD-10-CM

## 2023-06-01 DIAGNOSIS — I10 ESSENTIAL HYPERTENSION: ICD-10-CM

## 2023-06-01 DIAGNOSIS — E78.00 HYPERCHOLESTEROLEMIA: ICD-10-CM

## 2023-06-02 RX ORDER — LISINOPRIL AND HYDROCHLOROTHIAZIDE 25; 20 MG/1; MG/1
TABLET ORAL
Qty: 30 TABLET | Refills: 0 | Status: SHIPPED | OUTPATIENT
Start: 2023-06-02

## 2023-06-02 RX ORDER — LEVOTHYROXINE SODIUM 0.07 MG/1
75 TABLET ORAL DAILY
Qty: 30 TABLET | Refills: 0 | Status: SHIPPED | OUTPATIENT
Start: 2023-06-02 | End: 2023-07-02

## 2023-06-02 RX ORDER — AMLODIPINE BESYLATE 5 MG/1
5 TABLET ORAL DAILY
Qty: 30 TABLET | Refills: 0 | Status: SHIPPED | OUTPATIENT
Start: 2023-06-02 | End: 2023-07-02

## 2023-06-02 RX ORDER — OMEPRAZOLE 20 MG/1
20 CAPSULE, DELAYED RELEASE ORAL DAILY
Qty: 30 CAPSULE | Refills: 0 | Status: SHIPPED | OUTPATIENT
Start: 2023-06-02 | End: 2023-07-02

## 2023-06-02 RX ORDER — ATORVASTATIN CALCIUM 20 MG/1
20 TABLET, FILM COATED ORAL DAILY
Qty: 30 TABLET | Refills: 0 | Status: SHIPPED | OUTPATIENT
Start: 2023-06-02 | End: 2023-07-02

## 2023-06-02 NOTE — TELEPHONE ENCOUNTER
Patient is out of her medication and her  is in the hospital fwith a stroke so she couldn't make an appt  . Last seen 10/18/2022  Next appt Visit date not found

## 2023-07-01 DIAGNOSIS — E03.9 ACQUIRED HYPOTHYROIDISM: ICD-10-CM

## 2023-07-01 DIAGNOSIS — I10 ESSENTIAL HYPERTENSION: ICD-10-CM

## 2023-07-01 DIAGNOSIS — K21.9 GERD WITHOUT ESOPHAGITIS: ICD-10-CM

## 2023-07-01 DIAGNOSIS — E78.00 HYPERCHOLESTEROLEMIA: ICD-10-CM

## 2023-07-03 RX ORDER — AMLODIPINE BESYLATE 5 MG/1
5 TABLET ORAL DAILY
Qty: 30 TABLET | Refills: 0 | Status: SHIPPED | OUTPATIENT
Start: 2023-07-03 | End: 2023-08-02

## 2023-07-03 RX ORDER — ATORVASTATIN CALCIUM 20 MG/1
20 TABLET, FILM COATED ORAL DAILY
Qty: 30 TABLET | Refills: 0 | Status: SHIPPED | OUTPATIENT
Start: 2023-07-03 | End: 2023-08-02

## 2023-07-03 RX ORDER — LEVOTHYROXINE SODIUM 0.07 MG/1
75 TABLET ORAL DAILY
Qty: 30 TABLET | Refills: 0 | Status: SHIPPED | OUTPATIENT
Start: 2023-07-03 | End: 2023-08-02

## 2023-07-03 RX ORDER — LISINOPRIL AND HYDROCHLOROTHIAZIDE 25; 20 MG/1; MG/1
TABLET ORAL
Qty: 30 TABLET | Refills: 0 | Status: SHIPPED | OUTPATIENT
Start: 2023-07-03

## 2023-07-03 RX ORDER — OMEPRAZOLE 20 MG/1
20 CAPSULE, DELAYED RELEASE ORAL DAILY
Qty: 30 CAPSULE | Refills: 0 | Status: SHIPPED | OUTPATIENT
Start: 2023-07-03 | End: 2023-08-02

## 2023-07-03 NOTE — TELEPHONE ENCOUNTER
Called and spoke to patient and her  had a stroke and is now in a rehab facility. She states she has 1 pill left and has no way to come to office until her  comes home and she has no idea when that will be. I asked her if I could have  call her to help facilitate a ride and she said no. I told her that last month we gave her 30 day supply till she could come in.  Please advise

## 2023-08-01 DIAGNOSIS — E78.00 HYPERCHOLESTEROLEMIA: ICD-10-CM

## 2023-08-01 DIAGNOSIS — I10 ESSENTIAL HYPERTENSION: ICD-10-CM

## 2023-08-01 DIAGNOSIS — K21.9 GERD WITHOUT ESOPHAGITIS: ICD-10-CM

## 2023-08-02 RX ORDER — OMEPRAZOLE 20 MG/1
20 CAPSULE, DELAYED RELEASE ORAL DAILY
Qty: 30 CAPSULE | Refills: 0 | OUTPATIENT
Start: 2023-08-02 | End: 2023-09-01

## 2023-08-02 RX ORDER — AMLODIPINE BESYLATE 5 MG/1
5 TABLET ORAL DAILY
Qty: 30 TABLET | Refills: 0 | OUTPATIENT
Start: 2023-08-02 | End: 2023-09-01

## 2023-08-02 RX ORDER — ATORVASTATIN CALCIUM 20 MG/1
20 TABLET, FILM COATED ORAL DAILY
Qty: 30 TABLET | Refills: 0 | OUTPATIENT
Start: 2023-08-02 | End: 2023-09-01

## 2023-08-02 RX ORDER — LISINOPRIL AND HYDROCHLOROTHIAZIDE 25; 20 MG/1; MG/1
TABLET ORAL
Qty: 30 TABLET | Refills: 0 | OUTPATIENT
Start: 2023-08-02

## 2023-08-15 DIAGNOSIS — E03.9 ACQUIRED HYPOTHYROIDISM: ICD-10-CM

## 2023-08-15 DIAGNOSIS — I10 ESSENTIAL HYPERTENSION: ICD-10-CM

## 2023-08-15 DIAGNOSIS — E78.00 HYPERCHOLESTEROLEMIA: ICD-10-CM

## 2023-08-16 RX ORDER — ATORVASTATIN CALCIUM 20 MG/1
20 TABLET, FILM COATED ORAL DAILY
Qty: 30 TABLET | Refills: 0 | Status: SHIPPED | OUTPATIENT
Start: 2023-08-16 | End: 2023-09-15

## 2023-08-16 RX ORDER — LISINOPRIL AND HYDROCHLOROTHIAZIDE 25; 20 MG/1; MG/1
1 TABLET ORAL DAILY
Qty: 30 TABLET | Refills: 0 | Status: SHIPPED | OUTPATIENT
Start: 2023-08-16

## 2023-08-16 RX ORDER — ATORVASTATIN CALCIUM 20 MG/1
20 TABLET, FILM COATED ORAL DAILY
Qty: 30 TABLET | Refills: 0 | OUTPATIENT
Start: 2023-08-16 | End: 2023-09-15

## 2023-08-16 RX ORDER — LEVOTHYROXINE SODIUM 0.07 MG/1
75 TABLET ORAL DAILY
Qty: 30 TABLET | Refills: 0 | Status: SHIPPED | OUTPATIENT
Start: 2023-08-16 | End: 2023-09-15

## 2023-08-16 RX ORDER — LISINOPRIL AND HYDROCHLOROTHIAZIDE 25; 20 MG/1; MG/1
TABLET ORAL
Qty: 30 TABLET | Refills: 0 | OUTPATIENT
Start: 2023-08-16

## 2023-08-16 NOTE — TELEPHONE ENCOUNTER
Called patient back to schedule an appt. She states she has no way of getting to the office. She states her  is recovering from a stroke and is inpatient rehab and is unable to drive her. Her daughter lives out of state. The only other person is her brother who is not doing well himself and does quick grocery store trips for her. I asked her if her brother could bring her here to the office for a quick appt. So we could see how things are going because we must monitor her on the medications and do labwork to make sure everything is going ok for her, she states, he can barely do what he is doing she can not ask any more of him. She said that she will just have to wait until her  gets out of rehab and she will just give us a call back when he does and schedule an appt then. Offered the patient The [address] [place] [address], to pick her up for her appt and take her back home, I would schedule the appt for her now. She stated there would be no way that she could leave their dog alone in the house by itself, even for a short while. It cant be left alone she states. Would you maybe consider giving her a 30 day supply so that she may have time to locate a proper ride or her  gets out of rehab?     Please advise        Last seen 10/18/2022  Next appt Visit date not found

## 2023-08-16 NOTE — TELEPHONE ENCOUNTER
I will renew patient's medications for 30 days. What is the name of that [facility] called agency we tried to get out to see that other patient. Can we find out if she may qualify for.   That way she won't have to worry about this in the future

## 2023-12-15 PROBLEM — R26.2 UNABLE TO AMBULATE: Status: ACTIVE | Noted: 2023-12-15

## 2023-12-16 PROBLEM — G89.29 CHRONIC PAIN OF BOTH SHOULDERS: Status: RESOLVED | Noted: 2022-05-05 | Resolved: 2023-12-16

## 2023-12-16 PROBLEM — K21.9 GERD WITHOUT ESOPHAGITIS: Status: RESOLVED | Noted: 2022-05-05 | Resolved: 2023-12-16

## 2023-12-16 PROBLEM — M25.511 CHRONIC PAIN OF BOTH SHOULDERS: Status: RESOLVED | Noted: 2022-05-05 | Resolved: 2023-12-16

## 2023-12-16 PROBLEM — J44.1 COPD EXACERBATION (HCC): Status: RESOLVED | Noted: 2022-05-06 | Resolved: 2023-12-16

## 2023-12-16 PROBLEM — J44.9 COPD (CHRONIC OBSTRUCTIVE PULMONARY DISEASE) (HCC): Status: ACTIVE | Noted: 2022-05-06

## 2023-12-16 PROBLEM — M25.512 CHRONIC PAIN OF BOTH SHOULDERS: Status: RESOLVED | Noted: 2022-05-05 | Resolved: 2023-12-16

## 2023-12-16 PROBLEM — J96.01 ACUTE RESPIRATORY FAILURE WITH HYPOXIA (HCC): Status: RESOLVED | Noted: 2018-04-01 | Resolved: 2023-12-16

## 2024-01-13 ENCOUNTER — APPOINTMENT (OUTPATIENT)
Dept: GENERAL RADIOLOGY | Age: 79
DRG: 870 | End: 2024-01-13
Payer: MEDICARE

## 2024-01-13 ENCOUNTER — APPOINTMENT (OUTPATIENT)
Dept: ULTRASOUND IMAGING | Age: 79
DRG: 870 | End: 2024-01-13
Payer: MEDICARE

## 2024-01-13 ENCOUNTER — HOSPITAL ENCOUNTER (INPATIENT)
Age: 79
LOS: 5 days | DRG: 870 | End: 2024-01-18
Attending: EMERGENCY MEDICINE | Admitting: INTERNAL MEDICINE
Payer: MEDICARE

## 2024-01-13 ENCOUNTER — APPOINTMENT (OUTPATIENT)
Dept: CT IMAGING | Age: 79
DRG: 870 | End: 2024-01-13
Payer: MEDICARE

## 2024-01-13 DIAGNOSIS — J96.21 ACUTE ON CHRONIC RESPIRATORY FAILURE WITH HYPOXIA (HCC): ICD-10-CM

## 2024-01-13 DIAGNOSIS — N17.9 AKI (ACUTE KIDNEY INJURY) (HCC): ICD-10-CM

## 2024-01-13 DIAGNOSIS — U07.1 COVID-19: ICD-10-CM

## 2024-01-13 DIAGNOSIS — J18.9 PNEUMONIA DUE TO INFECTIOUS ORGANISM, UNSPECIFIED LATERALITY, UNSPECIFIED PART OF LUNG: ICD-10-CM

## 2024-01-13 DIAGNOSIS — R09.02 HYPOXIA: Primary | ICD-10-CM

## 2024-01-13 PROBLEM — E87.0 HYPERNATREMIA: Status: ACTIVE | Noted: 2024-01-13

## 2024-01-13 PROBLEM — E86.0 DEHYDRATION: Status: ACTIVE | Noted: 2024-01-13

## 2024-01-13 PROBLEM — J12.82 PNEUMONIA DUE TO COVID-19 VIRUS: Status: ACTIVE | Noted: 2024-01-13

## 2024-01-13 LAB
ALBUMIN SERPL-MCNC: 4.5 G/DL (ref 3.5–5.2)
ALP SERPL-CCNC: 261 U/L (ref 35–104)
ALT SERPL-CCNC: 100 U/L (ref 0–32)
AMMONIA PLAS-SCNC: 21 UMOL/L (ref 11–51)
AMPHET UR QL SCN: NEGATIVE
ANION GAP SERPL CALCULATED.3IONS-SCNC: 17 MMOL/L (ref 7–16)
ANION GAP SERPL CALCULATED.3IONS-SCNC: 24 MMOL/L (ref 7–16)
APAP SERPL-MCNC: <5 UG/ML (ref 10–30)
AST SERPL-CCNC: 76 U/L (ref 0–31)
B PARAP IS1001 DNA NPH QL NAA+NON-PROBE: NOT DETECTED
B PERT DNA SPEC QL NAA+PROBE: NOT DETECTED
BARBITURATES UR QL SCN: NEGATIVE
BASOPHILS # BLD: 0 K/UL (ref 0–0.2)
BASOPHILS NFR BLD: 0 % (ref 0–2)
BENZODIAZ UR QL: NEGATIVE
BILIRUB SERPL-MCNC: 0.7 MG/DL (ref 0–1.2)
BILIRUB UR QL STRIP: NEGATIVE
BNP SERPL-MCNC: 882 PG/ML (ref 0–450)
BUN SERPL-MCNC: 100 MG/DL (ref 6–23)
BUN SERPL-MCNC: 102 MG/DL (ref 6–23)
BUPRENORPHINE UR QL: NEGATIVE
C PNEUM DNA NPH QL NAA+NON-PROBE: NOT DETECTED
CALCIUM SERPL-MCNC: 10.6 MG/DL (ref 8.6–10.2)
CALCIUM SERPL-MCNC: 9.7 MG/DL (ref 8.6–10.2)
CANNABINOIDS UR QL SCN: NEGATIVE
CASTS #/AREA URNS LPF: ABNORMAL /LPF
CASTS #/AREA URNS LPF: ABNORMAL /LPF
CHLORIDE SERPL-SCNC: 109 MMOL/L (ref 98–107)
CHLORIDE SERPL-SCNC: 118 MMOL/L (ref 98–107)
CLARITY UR: CLEAR
CO2 SERPL-SCNC: 24 MMOL/L (ref 22–29)
CO2 SERPL-SCNC: 25 MMOL/L (ref 22–29)
COCAINE UR QL SCN: NEGATIVE
COLOR UR: YELLOW
CREAT SERPL-MCNC: 2.3 MG/DL (ref 0.5–1)
CREAT SERPL-MCNC: 2.8 MG/DL (ref 0.5–1)
D DIMER: >5250 NG/ML DDU (ref 0–232)
EOSINOPHIL # BLD: 0 K/UL (ref 0.05–0.5)
EOSINOPHILS RELATIVE PERCENT: 0 % (ref 0–6)
EPI CELLS #/AREA URNS HPF: ABNORMAL /HPF
ERYTHROCYTE [DISTWIDTH] IN BLOOD BY AUTOMATED COUNT: 14.3 % (ref 11.5–15)
ETHANOLAMINE SERPL-MCNC: <10 MG/DL
FENTANYL UR QL: NEGATIVE
FLOW RATE: 15
FLUAV RNA NPH QL NAA+NON-PROBE: NOT DETECTED
FLUBV RNA NPH QL NAA+NON-PROBE: NOT DETECTED
GFR SERPL CREATININE-BSD FRML MDRD: 17 ML/MIN/1.73M2
GFR SERPL CREATININE-BSD FRML MDRD: 21 ML/MIN/1.73M2
GLUCOSE SERPL-MCNC: 139 MG/DL (ref 74–99)
GLUCOSE SERPL-MCNC: 149 MG/DL (ref 74–99)
GLUCOSE UR STRIP-MCNC: NEGATIVE MG/DL
HADV DNA NPH QL NAA+NON-PROBE: NOT DETECTED
HCOV 229E RNA NPH QL NAA+NON-PROBE: NOT DETECTED
HCOV HKU1 RNA NPH QL NAA+NON-PROBE: NOT DETECTED
HCOV NL63 RNA NPH QL NAA+NON-PROBE: NOT DETECTED
HCOV OC43 RNA NPH QL NAA+NON-PROBE: NOT DETECTED
HCT VFR BLD AUTO: 50.4 % (ref 34–48)
HGB BLD-MCNC: 16.7 G/DL (ref 11.5–15.5)
HGB UR QL STRIP.AUTO: NEGATIVE
HMPV RNA NPH QL NAA+NON-PROBE: NOT DETECTED
HPIV1 RNA NPH QL NAA+NON-PROBE: NOT DETECTED
HPIV2 RNA NPH QL NAA+NON-PROBE: NOT DETECTED
HPIV3 RNA NPH QL NAA+NON-PROBE: NOT DETECTED
HPIV4 RNA NPH QL NAA+NON-PROBE: NOT DETECTED
INFLUENZA A BY PCR: NOT DETECTED
INFLUENZA B BY PCR: NOT DETECTED
KETONES UR STRIP-MCNC: NEGATIVE MG/DL
LACTATE BLDV-SCNC: 2.2 MMOL/L (ref 0.5–1.9)
LACTATE BLDV-SCNC: 5.2 MMOL/L (ref 0.5–1.9)
LDH SERPL-CCNC: 795 U/L (ref 135–214)
LEUKOCYTE ESTERASE UR QL STRIP: NEGATIVE
LYMPHOCYTES NFR BLD: 1.79 K/UL (ref 1.5–4)
LYMPHOCYTES RELATIVE PERCENT: 9 % (ref 20–42)
M PNEUMO DNA NPH QL NAA+NON-PROBE: NOT DETECTED
MCH RBC QN AUTO: 32.8 PG (ref 26–35)
MCHC RBC AUTO-ENTMCNC: 33.1 G/DL (ref 32–34.5)
MCV RBC AUTO: 99 FL (ref 80–99.9)
METHADONE UR QL: NEGATIVE
MONOCYTES NFR BLD: 0.72 K/UL (ref 0.1–0.95)
MONOCYTES NFR BLD: 4 % (ref 2–12)
MYELOCYTES ABSOLUTE COUNT: 0.18 K/UL
MYELOCYTES: 1 %
NEGATIVE BASE EXCESS, ART: 1.9 MMOL/L
NEUTROPHILS NFR BLD: 87 % (ref 43–80)
NEUTS SEG NFR BLD: 17.72 K/UL (ref 1.8–7.3)
NITRITE UR QL STRIP: NEGATIVE
O2 DELIVERY DEVICE: ABNORMAL
OPIATES UR QL SCN: POSITIVE
OXYCODONE UR QL SCN: NEGATIVE
PCP UR QL SCN: NEGATIVE
PH UR STRIP: 5 [PH] (ref 5–9)
PLATELET # BLD AUTO: 417 K/UL (ref 130–450)
PMV BLD AUTO: 10.1 FL (ref 7–12)
POC HCO3: 22.9 MMOL/L (ref 22–26)
POC O2 SATURATION: 78.3 % (ref 92–98.5)
POC PCO2: 38.8 MM HG (ref 35–45)
POC PH: 7.38 (ref 7.35–7.45)
POC PO2: 43.5 MM HG (ref 60–80)
POTASSIUM SERPL-SCNC: 3.6 MMOL/L (ref 3.5–5)
POTASSIUM SERPL-SCNC: 4 MMOL/L (ref 3.5–5)
PROCALCITONIN SERPL-MCNC: 1.01 NG/ML (ref 0–0.08)
PROT SERPL-MCNC: 8.9 G/DL (ref 6.4–8.3)
PROT UR STRIP-MCNC: 30 MG/DL
RBC # BLD AUTO: 5.09 M/UL (ref 3.5–5.5)
RBC # BLD: ABNORMAL 10*6/UL
RBC #/AREA URNS HPF: ABNORMAL /HPF
RSV RNA NPH QL NAA+NON-PROBE: NOT DETECTED
RV+EV RNA NPH QL NAA+NON-PROBE: NOT DETECTED
SALICYLATES SERPL-MCNC: <0.3 MG/DL (ref 0–30)
SARS-COV-2 RDRP RESP QL NAA+PROBE: DETECTED
SARS-COV-2 RNA NPH QL NAA+NON-PROBE: DETECTED
SODIUM SERPL-SCNC: 157 MMOL/L (ref 132–146)
SODIUM SERPL-SCNC: 160 MMOL/L (ref 132–146)
SP GR UR STRIP: 1.02 (ref 1–1.03)
SPECIMEN DESCRIPTION: ABNORMAL
SPECIMEN DESCRIPTION: ABNORMAL
TEST INFORMATION: ABNORMAL
TOXIC TRICYCLIC SC,BLOOD: NEGATIVE
TROPONIN I SERPL HS-MCNC: 50 NG/L (ref 0–9)
TROPONIN I SERPL HS-MCNC: 61 NG/L (ref 0–9)
TSH SERPL DL<=0.05 MIU/L-ACNC: 3.18 UIU/ML (ref 0.27–4.2)
UROBILINOGEN UR STRIP-ACNC: 0.2 EU/DL (ref 0–1)
WBC #/AREA URNS HPF: ABNORMAL /HPF
WBC OTHER # BLD: 20.4 K/UL (ref 4.5–11.5)

## 2024-01-13 PROCEDURE — 94660 CPAP INITIATION&MGMT: CPT

## 2024-01-13 PROCEDURE — 96366 THER/PROPH/DIAG IV INF ADDON: CPT

## 2024-01-13 PROCEDURE — 2060000000 HC ICU INTERMEDIATE R&B

## 2024-01-13 PROCEDURE — 99285 EMERGENCY DEPT VISIT HI MDM: CPT

## 2024-01-13 PROCEDURE — 80307 DRUG TEST PRSMV CHEM ANLYZR: CPT

## 2024-01-13 PROCEDURE — 2580000003 HC RX 258: Performed by: SPECIALIST

## 2024-01-13 PROCEDURE — 85379 FIBRIN DEGRADATION QUANT: CPT

## 2024-01-13 PROCEDURE — 93005 ELECTROCARDIOGRAM TRACING: CPT | Performed by: EMERGENCY MEDICINE

## 2024-01-13 PROCEDURE — 84145 PROCALCITONIN (PCT): CPT

## 2024-01-13 PROCEDURE — 80143 DRUG ASSAY ACETAMINOPHEN: CPT

## 2024-01-13 PROCEDURE — 76770 US EXAM ABDO BACK WALL COMP: CPT

## 2024-01-13 PROCEDURE — 86140 C-REACTIVE PROTEIN: CPT

## 2024-01-13 PROCEDURE — 99222 1ST HOSP IP/OBS MODERATE 55: CPT | Performed by: INTERNAL MEDICINE

## 2024-01-13 PROCEDURE — 87502 INFLUENZA DNA AMP PROBE: CPT

## 2024-01-13 PROCEDURE — 87635 SARS-COV-2 COVID-19 AMP PRB: CPT

## 2024-01-13 PROCEDURE — 2580000003 HC RX 258: Performed by: INTERNAL MEDICINE

## 2024-01-13 PROCEDURE — 82803 BLOOD GASES ANY COMBINATION: CPT

## 2024-01-13 PROCEDURE — 6360000002 HC RX W HCPCS

## 2024-01-13 PROCEDURE — 94640 AIRWAY INHALATION TREATMENT: CPT

## 2024-01-13 PROCEDURE — 83880 ASSAY OF NATRIURETIC PEPTIDE: CPT

## 2024-01-13 PROCEDURE — 5A09357 ASSISTANCE WITH RESPIRATORY VENTILATION, LESS THAN 24 CONSECUTIVE HOURS, CONTINUOUS POSITIVE AIRWAY PRESSURE: ICD-10-PCS | Performed by: INTERNAL MEDICINE

## 2024-01-13 PROCEDURE — 83615 LACTATE (LD) (LDH) ENZYME: CPT

## 2024-01-13 PROCEDURE — 80053 COMPREHEN METABOLIC PANEL: CPT

## 2024-01-13 PROCEDURE — 87040 BLOOD CULTURE FOR BACTERIA: CPT

## 2024-01-13 PROCEDURE — 81001 URINALYSIS AUTO W/SCOPE: CPT

## 2024-01-13 PROCEDURE — 70450 CT HEAD/BRAIN W/O DYE: CPT

## 2024-01-13 PROCEDURE — G0480 DRUG TEST DEF 1-7 CLASSES: HCPCS

## 2024-01-13 PROCEDURE — 76705 ECHO EXAM OF ABDOMEN: CPT

## 2024-01-13 PROCEDURE — 6360000002 HC RX W HCPCS: Performed by: SPECIALIST

## 2024-01-13 PROCEDURE — 6360000002 HC RX W HCPCS: Performed by: INTERNAL MEDICINE

## 2024-01-13 PROCEDURE — 2580000003 HC RX 258: Performed by: EMERGENCY MEDICINE

## 2024-01-13 PROCEDURE — 71045 X-RAY EXAM CHEST 1 VIEW: CPT

## 2024-01-13 PROCEDURE — 84443 ASSAY THYROID STIM HORMONE: CPT

## 2024-01-13 PROCEDURE — 80179 DRUG ASSAY SALICYLATE: CPT

## 2024-01-13 PROCEDURE — 85025 COMPLETE CBC W/AUTO DIFF WBC: CPT

## 2024-01-13 PROCEDURE — 6370000000 HC RX 637 (ALT 250 FOR IP): Performed by: INTERNAL MEDICINE

## 2024-01-13 PROCEDURE — 84484 ASSAY OF TROPONIN QUANT: CPT

## 2024-01-13 PROCEDURE — 2580000003 HC RX 258: Performed by: FAMILY MEDICINE

## 2024-01-13 PROCEDURE — 51702 INSERT TEMP BLADDER CATH: CPT

## 2024-01-13 PROCEDURE — 2580000003 HC RX 258

## 2024-01-13 PROCEDURE — 83605 ASSAY OF LACTIC ACID: CPT

## 2024-01-13 PROCEDURE — 96365 THER/PROPH/DIAG IV INF INIT: CPT

## 2024-01-13 PROCEDURE — 82140 ASSAY OF AMMONIA: CPT

## 2024-01-13 PROCEDURE — 0202U NFCT DS 22 TRGT SARS-COV-2: CPT

## 2024-01-13 PROCEDURE — 80048 BASIC METABOLIC PNL TOTAL CA: CPT

## 2024-01-13 RX ORDER — ACETAMINOPHEN AND CODEINE PHOSPHATE 300; 30 MG/1; MG/1
1 TABLET ORAL EVERY 4 HOURS PRN
COMMUNITY

## 2024-01-13 RX ORDER — 0.9 % SODIUM CHLORIDE 0.9 %
1000 INTRAVENOUS SOLUTION INTRAVENOUS ONCE
Status: COMPLETED | OUTPATIENT
Start: 2024-01-13 | End: 2024-01-13

## 2024-01-13 RX ORDER — BUDESONIDE 0.5 MG/2ML
0.5 INHALANT ORAL
Status: DISCONTINUED | OUTPATIENT
Start: 2024-01-13 | End: 2024-01-18 | Stop reason: HOSPADM

## 2024-01-13 RX ORDER — ACETAMINOPHEN 325 MG/1
650 TABLET ORAL EVERY 6 HOURS PRN
Status: DISCONTINUED | OUTPATIENT
Start: 2024-01-13 | End: 2024-01-18 | Stop reason: HOSPADM

## 2024-01-13 RX ORDER — MIRTAZAPINE 15 MG/1
7.5 TABLET, FILM COATED ORAL NIGHTLY
COMMUNITY

## 2024-01-13 RX ORDER — DEXAMETHASONE SODIUM PHOSPHATE 4 MG/ML
4 INJECTION, SOLUTION INTRA-ARTICULAR; INTRALESIONAL; INTRAMUSCULAR; INTRAVENOUS; SOFT TISSUE EVERY 12 HOURS
Status: DISCONTINUED | OUTPATIENT
Start: 2024-01-13 | End: 2024-01-14

## 2024-01-13 RX ORDER — SODIUM CHLORIDE 0.9 % (FLUSH) 0.9 %
5-40 SYRINGE (ML) INJECTION PRN
Status: DISCONTINUED | OUTPATIENT
Start: 2024-01-13 | End: 2024-01-18 | Stop reason: HOSPADM

## 2024-01-13 RX ORDER — ONDANSETRON 4 MG/1
4 TABLET, ORALLY DISINTEGRATING ORAL EVERY 8 HOURS PRN
Status: DISCONTINUED | OUTPATIENT
Start: 2024-01-13 | End: 2024-01-13

## 2024-01-13 RX ORDER — DEXTROSE MONOHYDRATE 50 MG/ML
INJECTION, SOLUTION INTRAVENOUS CONTINUOUS
Status: DISCONTINUED | OUTPATIENT
Start: 2024-01-13 | End: 2024-01-15

## 2024-01-13 RX ORDER — SODIUM CHLORIDE 9 MG/ML
INJECTION, SOLUTION INTRAVENOUS PRN
Status: DISCONTINUED | OUTPATIENT
Start: 2024-01-13 | End: 2024-01-18 | Stop reason: HOSPADM

## 2024-01-13 RX ORDER — HEPARIN SODIUM 1000 [USP'U]/ML
80 INJECTION, SOLUTION INTRAVENOUS; SUBCUTANEOUS ONCE
Status: COMPLETED | OUTPATIENT
Start: 2024-01-13 | End: 2024-01-14

## 2024-01-13 RX ORDER — HEPARIN SODIUM 10000 [USP'U]/100ML
5-30 INJECTION, SOLUTION INTRAVENOUS CONTINUOUS
Status: DISCONTINUED | OUTPATIENT
Start: 2024-01-13 | End: 2024-01-15

## 2024-01-13 RX ORDER — POLYETHYLENE GLYCOL 3350 17 G/17G
17 POWDER, FOR SOLUTION ORAL DAILY PRN
Status: DISCONTINUED | OUTPATIENT
Start: 2024-01-13 | End: 2024-01-18 | Stop reason: HOSPADM

## 2024-01-13 RX ORDER — HEPARIN SODIUM 1000 [USP'U]/ML
80 INJECTION, SOLUTION INTRAVENOUS; SUBCUTANEOUS PRN
Status: DISCONTINUED | OUTPATIENT
Start: 2024-01-13 | End: 2024-01-15

## 2024-01-13 RX ORDER — PROCHLORPERAZINE EDISYLATE 5 MG/ML
10 INJECTION INTRAMUSCULAR; INTRAVENOUS EVERY 6 HOURS PRN
Status: DISCONTINUED | OUTPATIENT
Start: 2024-01-13 | End: 2024-01-18 | Stop reason: HOSPADM

## 2024-01-13 RX ORDER — IPRATROPIUM BROMIDE AND ALBUTEROL SULFATE 2.5; .5 MG/3ML; MG/3ML
1 SOLUTION RESPIRATORY (INHALATION)
Status: DISCONTINUED | OUTPATIENT
Start: 2024-01-13 | End: 2024-01-18 | Stop reason: HOSPADM

## 2024-01-13 RX ORDER — ONDANSETRON 2 MG/ML
4 INJECTION INTRAMUSCULAR; INTRAVENOUS EVERY 6 HOURS PRN
Status: DISCONTINUED | OUTPATIENT
Start: 2024-01-13 | End: 2024-01-13

## 2024-01-13 RX ORDER — HEPARIN SODIUM 5000 [USP'U]/ML
5000 INJECTION, SOLUTION INTRAVENOUS; SUBCUTANEOUS EVERY 8 HOURS SCHEDULED
Status: DISCONTINUED | OUTPATIENT
Start: 2024-01-13 | End: 2024-01-13

## 2024-01-13 RX ORDER — ACETAMINOPHEN 650 MG/1
650 SUPPOSITORY RECTAL EVERY 6 HOURS PRN
Status: DISCONTINUED | OUTPATIENT
Start: 2024-01-13 | End: 2024-01-18 | Stop reason: HOSPADM

## 2024-01-13 RX ORDER — SODIUM CHLORIDE 0.9 % (FLUSH) 0.9 %
5-40 SYRINGE (ML) INJECTION EVERY 12 HOURS SCHEDULED
Status: DISCONTINUED | OUTPATIENT
Start: 2024-01-13 | End: 2024-01-18 | Stop reason: HOSPADM

## 2024-01-13 RX ORDER — HEPARIN SODIUM 1000 [USP'U]/ML
40 INJECTION, SOLUTION INTRAVENOUS; SUBCUTANEOUS PRN
Status: DISCONTINUED | OUTPATIENT
Start: 2024-01-13 | End: 2024-01-15

## 2024-01-13 RX ADMIN — DEXTROSE MONOHYDRATE: 50 INJECTION, SOLUTION INTRAVENOUS at 23:21

## 2024-01-13 RX ADMIN — SODIUM CHLORIDE 1000 ML: 9 INJECTION, SOLUTION INTRAVENOUS at 14:00

## 2024-01-13 RX ADMIN — BUDESONIDE 500 MCG: 0.5 SUSPENSION RESPIRATORY (INHALATION) at 19:07

## 2024-01-13 RX ADMIN — DEXAMETHASONE SODIUM PHOSPHATE 4 MG: 4 INJECTION INTRA-ARTICULAR; INTRALESIONAL; INTRAMUSCULAR; INTRAVENOUS; SOFT TISSUE at 23:32

## 2024-01-13 RX ADMIN — IPRATROPIUM BROMIDE AND ALBUTEROL SULFATE 1 DOSE: .5; 2.5 SOLUTION RESPIRATORY (INHALATION) at 22:42

## 2024-01-13 RX ADMIN — CEFEPIME 2000 MG: 2 INJECTION, POWDER, FOR SOLUTION INTRAVENOUS at 15:43

## 2024-01-13 RX ADMIN — AZITHROMYCIN MONOHYDRATE 500 MG: 500 INJECTION, POWDER, LYOPHILIZED, FOR SOLUTION INTRAVENOUS at 23:43

## 2024-01-13 RX ADMIN — VANCOMYCIN HYDROCHLORIDE 1250 MG: 10 INJECTION, POWDER, LYOPHILIZED, FOR SOLUTION INTRAVENOUS at 19:23

## 2024-01-13 RX ADMIN — IPRATROPIUM BROMIDE AND ALBUTEROL SULFATE 1 DOSE: .5; 2.5 SOLUTION RESPIRATORY (INHALATION) at 19:07

## 2024-01-13 RX ADMIN — SODIUM CHLORIDE, PRESERVATIVE FREE 10 ML: 5 INJECTION INTRAVENOUS at 23:25

## 2024-01-13 ASSESSMENT — PAIN SCALES - GENERAL: PAINLEVEL_OUTOF10: 0

## 2024-01-13 NOTE — H&P
HISTORY AND PHYSICAL             Date: 1/13/2024        Patient Name: Harsh Michaels     YOB: 1945      Age:  79 y.o.    Chief Complaint     Chief Complaint   Patient presents with    Shortness of Breath     From [facility] for ams/sob, hypoxia on her normal NC 2-3L, full code          History Obtained From    electronic medical record,     History of Present Illness   This is a 79 years old white lady with significant past medical history of hypertension, hyperlipidemia, hypothyroidism, COPD who presented from nursing home because of increasing shortness of breath and hypoxia.  And altered mental status.  Her symptoms started about 2 days ago.  She was brought in by EMS to the emergency room was found to be hypoxic with pulse ox in the 70s and she came to the emergency room she was started on BiPAP her oxygenation is in the upper 80s low 90s.  Her sodium was elevated 157.  She was also found to be in acute renal failure with creatinine 2.8 previously was within normal limit of 0.6.  So she did have lactic acidosis.  With lactic acid of 5   Pt teste positive for covid in ER and CXR showed  irregular patchy opacities both lungs     Past Medical History     Past Medical History:   Diagnosis Date    Bronchitis     Depression     GERD (gastroesophageal reflux disease)     Hyperlipidemia     Hypertension     Irritable bowel     Kidney stone     Migraines     Thyroid disease         Past Surgical History     Past Surgical History:   Procedure Laterality Date    APPENDECTOMY      BACK SURGERY  2011    cervical disc surgery    HYSTERECTOMY (CERVIX STATUS UNKNOWN)      NERVE BLOCK Left 09/07/2016    sacroiliac joint #1    NERVE BLOCK Left 09/19/2016    si inj #2    NOSE SURGERY      bone removed d/t breathing    OTHER SURGICAL HISTORY Left 11/07/2016    sacroiliac joint radiofrequency    OTHER SURGICAL HISTORY Left 08/07/2017    radiofrequency, sacroiliac    SPINE SURGERY      SPLENECTOMY  age 25    d/t a

## 2024-01-13 NOTE — ED PROVIDER NOTES
[facility] EMERGENCY DEPARTMENT  EMERGENCY DEPARTMENT ENCOUNTER        Pt Name: Harsh Michaels  MRN: [id number]  Birthdate 1945  Date of evaluation: 1/13/2024  Provider: Vineet Amezcua II, DO  PCP: Kael Reynoso DO  Note Started: 12:04 PM EST 1/13/24    CHIEF COMPLAINT       Chief Complaint   Patient presents with    Shortness of Breath     From [facility] for ams/sob, hypoxia on her normal NC 2-3L, full code       HISTORY OF PRESENT ILLNESS: 1 or more Elements        Limitations to history : Altered Mental Status    Harsh Michaels is a 79 y.o. female with history of COPD, hypertension, brought in by EMS from nursing home for complaint of hypoxia, altered mental status.  Patient chronically on 2 L nasal cannula, was apparently satting in the 50s at nursing home.  Patient is not answering questions, but awake and responsive to pain.  Unsure as to what patient's baseline mentation is at this time.      Nursing Notes were all reviewed and agreed with or any disagreements were addressed in the HPI.      REVIEW OF EXTERNAL NOTE :       Records reviewed for medical history, surgical history, medication list.      Chart Review/External Note Review    Last Echo reviewed by Me:  No results found for: \"LVEF\", \"LVEFMODE\"          Controlled Substance Monitoring:    Acute and Chronic Pain Monitoring:   RX Monitoring Attestation Periodic Controlled Substance Monitoring   8/8/2016   2:00 PM The Prescription Monitoring Report for this patient was reviewed today. Possible medication side effects, risk of tolerance and/or dependence, and alternative treatments discussed           REVIEW OF SYSTEMS :      Positives and Pertinent negatives as per HPI.     SURGICAL HISTORY     Past Surgical History:   Procedure Laterality Date    APPENDECTOMY      BACK SURGERY  2011    cervical disc surgery    HYSTERECTOMY (CERVIX STATUS UNKNOWN)      NERVE BLOCK Left 09/07/2016    sacroiliac joint #1

## 2024-01-14 ENCOUNTER — APPOINTMENT (OUTPATIENT)
Dept: GENERAL RADIOLOGY | Age: 79
DRG: 870 | End: 2024-01-14
Payer: MEDICARE

## 2024-01-14 LAB
AADO2: 566.5 MMHG
AADO2: 588.9 MMHG
ALBUMIN SERPL-MCNC: 3.7 G/DL (ref 3.5–5.2)
ALP SERPL-CCNC: 250 U/L (ref 35–104)
ALT SERPL-CCNC: 75 U/L (ref 0–32)
ANION GAP SERPL CALCULATED.3IONS-SCNC: 12 MMOL/L (ref 7–16)
ANION GAP SERPL CALCULATED.3IONS-SCNC: 12 MMOL/L (ref 7–16)
ANION GAP SERPL CALCULATED.3IONS-SCNC: 18 MMOL/L (ref 7–16)
ANION GAP SERPL CALCULATED.3IONS-SCNC: 19 MMOL/L (ref 7–16)
AST SERPL-CCNC: 70 U/L (ref 0–31)
B.E.: -1.7 MMOL/L (ref -3–3)
B.E.: -2.6 MMOL/L (ref -3–3)
BASOPHILS # BLD: 0 K/UL (ref 0–0.2)
BASOPHILS NFR BLD: 0 % (ref 0–2)
BILIRUB SERPL-MCNC: 0.5 MG/DL (ref 0–1.2)
BUN SERPL-MCNC: 70 MG/DL (ref 6–23)
BUN SERPL-MCNC: 70 MG/DL (ref 6–23)
BUN SERPL-MCNC: 76 MG/DL (ref 6–23)
BUN SERPL-MCNC: 95 MG/DL (ref 6–23)
CALCIUM SERPL-MCNC: 8.8 MG/DL (ref 8.6–10.2)
CALCIUM SERPL-MCNC: 9.2 MG/DL (ref 8.6–10.2)
CALCIUM SERPL-MCNC: 9.5 MG/DL (ref 8.6–10.2)
CALCIUM SERPL-MCNC: 9.7 MG/DL (ref 8.6–10.2)
CHLORIDE SERPL-SCNC: 121 MMOL/L (ref 98–107)
CHLORIDE SERPL-SCNC: 123 MMOL/L (ref 98–107)
CHLORIDE SERPL-SCNC: 126 MMOL/L (ref 98–107)
CHLORIDE SERPL-SCNC: 127 MMOL/L (ref 98–107)
CK SERPL-CCNC: 206 U/L (ref 20–180)
CO2 SERPL-SCNC: 20 MMOL/L (ref 22–29)
CO2 SERPL-SCNC: 21 MMOL/L (ref 22–29)
CO2 SERPL-SCNC: 24 MMOL/L (ref 22–29)
CO2 SERPL-SCNC: 24 MMOL/L (ref 22–29)
COHB: 0.6 % (ref 0–1.5)
COHB: 0.9 % (ref 0–1.5)
CREAT SERPL-MCNC: 1.3 MG/DL (ref 0.5–1)
CREAT SERPL-MCNC: 1.4 MG/DL (ref 0.5–1)
CREAT SERPL-MCNC: 1.4 MG/DL (ref 0.5–1)
CREAT SERPL-MCNC: 2 MG/DL (ref 0.5–1)
CREAT UR-MCNC: 46.6 MG/DL (ref 29–226)
CRITICAL: ABNORMAL
CRITICAL: ABNORMAL
CRP SERPL HS-MCNC: 179 MG/L (ref 0–5)
CRP SERPL HS-MCNC: 182 MG/L (ref 0–5)
DATE ANALYZED: ABNORMAL
DATE ANALYZED: ABNORMAL
DATE LAST DOSE: NORMAL
DATE OF COLLECTION: ABNORMAL
DATE OF COLLECTION: ABNORMAL
EOSINOPHIL # BLD: 0 K/UL (ref 0.05–0.5)
EOSINOPHILS RELATIVE PERCENT: 0 % (ref 0–6)
ERYTHROCYTE [DISTWIDTH] IN BLOOD BY AUTOMATED COUNT: 14.4 % (ref 11.5–15)
ERYTHROCYTE [DISTWIDTH] IN BLOOD BY AUTOMATED COUNT: 14.6 % (ref 11.5–15)
ERYTHROCYTE [SEDIMENTATION RATE] IN BLOOD BY WESTERGREN METHOD: 29 MM/HR (ref 0–20)
FERRITIN SERPL-MCNC: 1802 NG/ML
FIBRINOGEN PPP-MCNC: 327 MG/DL (ref 200–400)
FIO2: 100 %
FIO2: 100 %
GFR SERPL CREATININE-BSD FRML MDRD: 26 ML/MIN/1.73M2
GFR SERPL CREATININE-BSD FRML MDRD: 37 ML/MIN/1.73M2
GFR SERPL CREATININE-BSD FRML MDRD: 38 ML/MIN/1.73M2
GFR SERPL CREATININE-BSD FRML MDRD: 42 ML/MIN/1.73M2
GLUCOSE BLD-MCNC: 152 MG/DL (ref 74–99)
GLUCOSE BLD-MCNC: 219 MG/DL (ref 74–99)
GLUCOSE SERPL-MCNC: 162 MG/DL (ref 74–99)
GLUCOSE SERPL-MCNC: 183 MG/DL (ref 74–99)
GLUCOSE SERPL-MCNC: 229 MG/DL (ref 74–99)
GLUCOSE SERPL-MCNC: 249 MG/DL (ref 74–99)
HCO3: 22.5 MMOL/L (ref 22–26)
HCO3: 22.9 MMOL/L (ref 22–26)
HCT VFR BLD AUTO: 44.1 % (ref 34–48)
HCT VFR BLD AUTO: 45.6 % (ref 34–48)
HGB BLD-MCNC: 14.1 G/DL (ref 11.5–15.5)
HGB BLD-MCNC: 14.6 G/DL (ref 11.5–15.5)
HHB: 5.5 % (ref 0–5)
HHB: 8.9 % (ref 0–5)
INR PPP: 1.2
LAB: ABNORMAL
LAB: ABNORMAL
LACTATE BLDV-SCNC: 2.3 MMOL/L (ref 0.5–2.2)
LYMPHOCYTES NFR BLD: 2.09 K/UL (ref 1.5–4)
LYMPHOCYTES RELATIVE PERCENT: 10 % (ref 20–42)
Lab: 1113
Lab: 925
MCH RBC QN AUTO: 32.4 PG (ref 26–35)
MCH RBC QN AUTO: 32.6 PG (ref 26–35)
MCHC RBC AUTO-ENTMCNC: 32 G/DL (ref 32–34.5)
MCHC RBC AUTO-ENTMCNC: 32 G/DL (ref 32–34.5)
MCV RBC AUTO: 101.1 FL (ref 80–99.9)
MCV RBC AUTO: 101.8 FL (ref 80–99.9)
METHB: 0.3 % (ref 0–1.5)
METHB: 0.3 % (ref 0–1.5)
MODE: ABNORMAL
MONOCYTES NFR BLD: 0 % (ref 2–12)
MONOCYTES NFR BLD: 0 K/UL (ref 0.1–0.95)
MYELOCYTES ABSOLUTE COUNT: 0.21 K/UL
MYELOCYTES: 1 %
NEUTROPHILS NFR BLD: 89 % (ref 43–80)
NEUTS SEG NFR BLD: 18.6 K/UL (ref 1.8–7.3)
NUCLEATED RED BLOOD CELLS: 3 PER 100 WBC
O2 CONTENT: 18.5 ML/DL
O2 CONTENT: 19.4 ML/DL
O2 SATURATION: 91 % (ref 92–98.5)
O2 SATURATION: 94.4 % (ref 92–98.5)
O2HB: 90.2 % (ref 94–97)
O2HB: 93.3 % (ref 94–97)
OPERATOR ID: 1821
OPERATOR ID: 797
PARTIAL THROMBOPLASTIN TIME: 23 SEC (ref 24.5–35.1)
PARTIAL THROMBOPLASTIN TIME: 88.8 SEC (ref 24.5–35.1)
PARTIAL THROMBOPLASTIN TIME: 98.6 SEC (ref 24.5–35.1)
PATIENT TEMP: 37 C
PATIENT TEMP: 37 C
PCO2: 36.8 MMHG (ref 35–45)
PCO2: 42.5 MMHG (ref 35–45)
PEEP/CPAP: 8 CMH2O
PEEP/CPAP: 8 CMH2O
PFO2: 0.62 MMHG/%
PFO2: 0.79 MMHG/%
PH BLOOD GAS: 7.35 (ref 7.35–7.45)
PH BLOOD GAS: 7.4 (ref 7.35–7.45)
PIP: 14 CMH2O
PLATELET # BLD AUTO: 304 K/UL (ref 130–450)
PLATELET # BLD AUTO: 310 K/UL (ref 130–450)
PMV BLD AUTO: 10.5 FL (ref 7–12)
PMV BLD AUTO: 10.6 FL (ref 7–12)
PO2: 62.3 MMHG (ref 75–100)
PO2: 79 MMHG (ref 75–100)
POTASSIUM SERPL-SCNC: 3.2 MMOL/L (ref 3.5–5)
POTASSIUM SERPL-SCNC: 3.3 MMOL/L (ref 3.5–5)
POTASSIUM SERPL-SCNC: 3.8 MMOL/L (ref 3.5–5)
POTASSIUM SERPL-SCNC: 4 MMOL/L (ref 3.5–5)
PROCALCITONIN SERPL-MCNC: 0.31 NG/ML (ref 0–0.08)
PROT SERPL-MCNC: 7.6 G/DL (ref 6.4–8.3)
PROTHROMBIN TIME: 13.1 SEC (ref 9.3–12.4)
PS: 6 CMH20
RBC # BLD AUTO: 4.33 M/UL (ref 3.5–5.5)
RBC # BLD AUTO: 4.51 M/UL (ref 3.5–5.5)
RBC # BLD: ABNORMAL 10*6/UL
RI(T): 7.17
RI(T): 9.45
RR MECHANICAL: 24 B/MIN
SODIUM SERPL-SCNC: 159 MMOL/L (ref 132–146)
SODIUM SERPL-SCNC: 161 MMOL/L (ref 132–146)
SODIUM SERPL-SCNC: 162 MMOL/L (ref 132–146)
SODIUM SERPL-SCNC: 165 MMOL/L (ref 132–146)
SODIUM UR-SCNC: <20 MMOL/L
SOURCE, BLOOD GAS: ABNORMAL
SOURCE, BLOOD GAS: ABNORMAL
THB: 14.1 G/DL (ref 11.5–16.5)
THB: 15.3 G/DL (ref 11.5–16.5)
TIME ANALYZED: 1117
TIME ANALYZED: 932
TME LAST DOSE: NORMAL H
TROPONIN I SERPL HS-MCNC: 35 NG/L (ref 0–9)
VANCOMYCIN DOSE: NORMAL MG
VANCOMYCIN SERPL-MCNC: 16.4 UG/ML (ref 5–40)
VT MECHANICAL: 300 ML
WBC OTHER # BLD: 20.9 K/UL (ref 4.5–11.5)
WBC OTHER # BLD: 24.1 K/UL (ref 4.5–11.5)

## 2024-01-14 PROCEDURE — 94640 AIRWAY INHALATION TREATMENT: CPT

## 2024-01-14 PROCEDURE — 2580000003 HC RX 258: Performed by: INTERNAL MEDICINE

## 2024-01-14 PROCEDURE — 2580000003 HC RX 258

## 2024-01-14 PROCEDURE — 87449 NOS EACH ORGANISM AG IA: CPT

## 2024-01-14 PROCEDURE — 85384 FIBRINOGEN ACTIVITY: CPT

## 2024-01-14 PROCEDURE — 80048 BASIC METABOLIC PNL TOTAL CA: CPT

## 2024-01-14 PROCEDURE — 85025 COMPLETE CBC W/AUTO DIFF WBC: CPT

## 2024-01-14 PROCEDURE — 80053 COMPREHEN METABOLIC PANEL: CPT

## 2024-01-14 PROCEDURE — 36592 COLLECT BLOOD FROM PICC: CPT

## 2024-01-14 PROCEDURE — 4A133J1 MONITORING OF ARTERIAL PULSE, PERIPHERAL, PERCUTANEOUS APPROACH: ICD-10-PCS | Performed by: INTERNAL MEDICINE

## 2024-01-14 PROCEDURE — 36556 INSERT NON-TUNNEL CV CATH: CPT

## 2024-01-14 PROCEDURE — 6360000002 HC RX W HCPCS

## 2024-01-14 PROCEDURE — 85027 COMPLETE CBC AUTOMATED: CPT

## 2024-01-14 PROCEDURE — 87205 SMEAR GRAM STAIN: CPT

## 2024-01-14 PROCEDURE — 80202 ASSAY OF VANCOMYCIN: CPT

## 2024-01-14 PROCEDURE — 2580000003 HC RX 258: Performed by: FAMILY MEDICINE

## 2024-01-14 PROCEDURE — 84145 PROCALCITONIN (PCT): CPT

## 2024-01-14 PROCEDURE — 5A1955Z RESPIRATORY VENTILATION, GREATER THAN 96 CONSECUTIVE HOURS: ICD-10-PCS | Performed by: INTERNAL MEDICINE

## 2024-01-14 PROCEDURE — 31500 INSERT EMERGENCY AIRWAY: CPT

## 2024-01-14 PROCEDURE — 6370000000 HC RX 637 (ALT 250 FOR IP): Performed by: INTERNAL MEDICINE

## 2024-01-14 PROCEDURE — 87070 CULTURE OTHR SPECIMN AEROBIC: CPT

## 2024-01-14 PROCEDURE — 6360000002 HC RX W HCPCS: Performed by: INTERNAL MEDICINE

## 2024-01-14 PROCEDURE — 31500 INSERT EMERGENCY AIRWAY: CPT | Performed by: INTERNAL MEDICINE

## 2024-01-14 PROCEDURE — 4A133B1 MONITORING OF ARTERIAL PRESSURE, PERIPHERAL, PERCUTANEOUS APPROACH: ICD-10-PCS | Performed by: INTERNAL MEDICINE

## 2024-01-14 PROCEDURE — 87040 BLOOD CULTURE FOR BACTERIA: CPT

## 2024-01-14 PROCEDURE — 82550 ASSAY OF CK (CPK): CPT

## 2024-01-14 PROCEDURE — 84484 ASSAY OF TROPONIN QUANT: CPT

## 2024-01-14 PROCEDURE — 86738 MYCOPLASMA ANTIBODY: CPT

## 2024-01-14 PROCEDURE — 6360000002 HC RX W HCPCS: Performed by: SPECIALIST

## 2024-01-14 PROCEDURE — 82805 BLOOD GASES W/O2 SATURATION: CPT

## 2024-01-14 PROCEDURE — 2580000003 HC RX 258: Performed by: SPECIALIST

## 2024-01-14 PROCEDURE — 87081 CULTURE SCREEN ONLY: CPT

## 2024-01-14 PROCEDURE — 71045 X-RAY EXAM CHEST 1 VIEW: CPT

## 2024-01-14 PROCEDURE — 99291 CRITICAL CARE FIRST HOUR: CPT | Performed by: INTERNAL MEDICINE

## 2024-01-14 PROCEDURE — 2500000003 HC RX 250 WO HCPCS

## 2024-01-14 PROCEDURE — 85652 RBC SED RATE AUTOMATED: CPT

## 2024-01-14 PROCEDURE — 74018 RADEX ABDOMEN 1 VIEW: CPT

## 2024-01-14 PROCEDURE — 6360000002 HC RX W HCPCS: Performed by: FAMILY MEDICINE

## 2024-01-14 PROCEDURE — 82728 ASSAY OF FERRITIN: CPT

## 2024-01-14 PROCEDURE — 82570 ASSAY OF URINE CREATININE: CPT

## 2024-01-14 PROCEDURE — 87899 AGENT NOS ASSAY W/OPTIC: CPT

## 2024-01-14 PROCEDURE — 85610 PROTHROMBIN TIME: CPT

## 2024-01-14 PROCEDURE — 36415 COLL VENOUS BLD VENIPUNCTURE: CPT

## 2024-01-14 PROCEDURE — XW033H5 INTRODUCTION OF TOCILIZUMAB INTO PERIPHERAL VEIN, PERCUTANEOUS APPROACH, NEW TECHNOLOGY GROUP 5: ICD-10-PCS | Performed by: INTERNAL MEDICINE

## 2024-01-14 PROCEDURE — 82962 GLUCOSE BLOOD TEST: CPT

## 2024-01-14 PROCEDURE — 02HV33Z INSERTION OF INFUSION DEVICE INTO SUPERIOR VENA CAVA, PERCUTANEOUS APPROACH: ICD-10-PCS | Performed by: INTERNAL MEDICINE

## 2024-01-14 PROCEDURE — 85730 THROMBOPLASTIN TIME PARTIAL: CPT

## 2024-01-14 PROCEDURE — 83605 ASSAY OF LACTIC ACID: CPT

## 2024-01-14 PROCEDURE — 94002 VENT MGMT INPAT INIT DAY: CPT

## 2024-01-14 PROCEDURE — 2000000000 HC ICU R&B

## 2024-01-14 PROCEDURE — 94660 CPAP INITIATION&MGMT: CPT

## 2024-01-14 PROCEDURE — C9113 INJ PANTOPRAZOLE SODIUM, VIA: HCPCS

## 2024-01-14 PROCEDURE — 87107 FUNGI IDENTIFICATION MOLD: CPT

## 2024-01-14 PROCEDURE — 86140 C-REACTIVE PROTEIN: CPT

## 2024-01-14 PROCEDURE — 2500000003 HC RX 250 WO HCPCS: Performed by: INTERNAL MEDICINE

## 2024-01-14 PROCEDURE — 99233 SBSQ HOSP IP/OBS HIGH 50: CPT | Performed by: INTERNAL MEDICINE

## 2024-01-14 PROCEDURE — 84300 ASSAY OF URINE SODIUM: CPT

## 2024-01-14 PROCEDURE — 6370000000 HC RX 637 (ALT 250 FOR IP)

## 2024-01-14 PROCEDURE — 0BH17EZ INSERTION OF ENDOTRACHEAL AIRWAY INTO TRACHEA, VIA NATURAL OR ARTIFICIAL OPENING: ICD-10-PCS | Performed by: INTERNAL MEDICINE

## 2024-01-14 PROCEDURE — 03HY32Z INSERTION OF MONITORING DEVICE INTO UPPER ARTERY, PERCUTANEOUS APPROACH: ICD-10-PCS | Performed by: INTERNAL MEDICINE

## 2024-01-14 RX ORDER — EPINEPHRINE 1 MG/ML
0.3 INJECTION, SOLUTION, CONCENTRATE INTRAVENOUS
Status: ACTIVE | OUTPATIENT
Start: 2024-01-14 | End: 2024-01-15

## 2024-01-14 RX ORDER — MIDAZOLAM HYDROCHLORIDE 1 MG/ML
1-10 INJECTION, SOLUTION INTRAVENOUS CONTINUOUS
Status: DISCONTINUED | OUTPATIENT
Start: 2024-01-14 | End: 2024-01-14

## 2024-01-14 RX ORDER — GLUCAGON 1 MG/ML
1 KIT INJECTION PRN
Status: DISCONTINUED | OUTPATIENT
Start: 2024-01-14 | End: 2024-01-18 | Stop reason: HOSPADM

## 2024-01-14 RX ORDER — FENTANYL CITRATE 0.05 MG/ML
25 INJECTION, SOLUTION INTRAMUSCULAR; INTRAVENOUS ONCE
Status: COMPLETED | OUTPATIENT
Start: 2024-01-14 | End: 2024-01-14

## 2024-01-14 RX ORDER — INSULIN LISPRO 100 [IU]/ML
0-4 INJECTION, SOLUTION INTRAVENOUS; SUBCUTANEOUS EVERY 4 HOURS
Status: DISCONTINUED | OUTPATIENT
Start: 2024-01-14 | End: 2024-01-14

## 2024-01-14 RX ORDER — DEXTROSE MONOHYDRATE 100 MG/ML
INJECTION, SOLUTION INTRAVENOUS CONTINUOUS PRN
Status: DISCONTINUED | OUTPATIENT
Start: 2024-01-14 | End: 2024-01-18 | Stop reason: HOSPADM

## 2024-01-14 RX ORDER — INSULIN LISPRO 100 [IU]/ML
0-4 INJECTION, SOLUTION INTRAVENOUS; SUBCUTANEOUS EVERY 4 HOURS
Status: DISCONTINUED | OUTPATIENT
Start: 2024-01-14 | End: 2024-01-18 | Stop reason: HOSPADM

## 2024-01-14 RX ORDER — DEXAMETHASONE SODIUM PHOSPHATE 10 MG/ML
6 INJECTION INTRAMUSCULAR; INTRAVENOUS EVERY 24 HOURS
Status: DISCONTINUED | OUTPATIENT
Start: 2024-01-14 | End: 2024-01-17

## 2024-01-14 RX ORDER — MIDAZOLAM HYDROCHLORIDE 1 MG/ML
INJECTION INTRAMUSCULAR; INTRAVENOUS
Status: COMPLETED
Start: 2024-01-14 | End: 2024-01-14

## 2024-01-14 RX ORDER — DIPHENHYDRAMINE HYDROCHLORIDE 50 MG/ML
25 INJECTION INTRAMUSCULAR; INTRAVENOUS ONCE
Status: COMPLETED | OUTPATIENT
Start: 2024-01-14 | End: 2024-01-14

## 2024-01-14 RX ORDER — FENTANYL CITRATE 0.05 MG/ML
75 INJECTION, SOLUTION INTRAMUSCULAR; INTRAVENOUS ONCE
Status: COMPLETED | OUTPATIENT
Start: 2024-01-14 | End: 2024-01-14

## 2024-01-14 RX ORDER — MIDAZOLAM HYDROCHLORIDE 1 MG/ML
2 INJECTION INTRAMUSCULAR; INTRAVENOUS ONCE
Status: COMPLETED | OUTPATIENT
Start: 2024-01-14 | End: 2024-01-14

## 2024-01-14 RX ORDER — ETOMIDATE 2 MG/ML
10 INJECTION INTRAVENOUS ONCE
Status: COMPLETED | OUTPATIENT
Start: 2024-01-14 | End: 2024-01-14

## 2024-01-14 RX ORDER — SUCCINYLCHOLINE CHLORIDE 20 MG/ML
40 INJECTION INTRAMUSCULAR; INTRAVENOUS ONCE
Status: COMPLETED | OUTPATIENT
Start: 2024-01-14 | End: 2024-01-14

## 2024-01-14 RX ORDER — CHLORHEXIDINE GLUCONATE ORAL RINSE 1.2 MG/ML
15 SOLUTION DENTAL 2 TIMES DAILY
Status: DISCONTINUED | OUTPATIENT
Start: 2024-01-14 | End: 2024-01-18 | Stop reason: HOSPADM

## 2024-01-14 RX ORDER — MINERAL OIL, PETROLATUM 425; 568 MG/G; MG/G
OINTMENT OPHTHALMIC EVERY 4 HOURS
Status: DISCONTINUED | OUTPATIENT
Start: 2024-01-14 | End: 2024-01-18 | Stop reason: HOSPADM

## 2024-01-14 RX ORDER — POLYVINYL ALCOHOL 14 MG/ML
1 SOLUTION/ DROPS OPHTHALMIC EVERY 4 HOURS
Status: DISCONTINUED | OUTPATIENT
Start: 2024-01-14 | End: 2024-01-14 | Stop reason: CLARIF

## 2024-01-14 RX ORDER — DEXAMETHASONE SODIUM PHOSPHATE 10 MG/ML
6 INJECTION INTRAMUSCULAR; INTRAVENOUS EVERY 12 HOURS
Status: DISCONTINUED | OUTPATIENT
Start: 2024-01-14 | End: 2024-01-14

## 2024-01-14 RX ORDER — LORAZEPAM 2 MG/ML
0.5 INJECTION INTRAMUSCULAR ONCE
Status: COMPLETED | OUTPATIENT
Start: 2024-01-14 | End: 2024-01-14

## 2024-01-14 RX ORDER — ACETAMINOPHEN 325 MG/1
650 TABLET ORAL ONCE
Status: COMPLETED | OUTPATIENT
Start: 2024-01-14 | End: 2024-01-14

## 2024-01-14 RX ORDER — MIDAZOLAM HYDROCHLORIDE 1 MG/ML
1-10 INJECTION, SOLUTION INTRAVENOUS CONTINUOUS
Status: DISCONTINUED | OUTPATIENT
Start: 2024-01-14 | End: 2024-01-18 | Stop reason: HOSPADM

## 2024-01-14 RX ORDER — ETOMIDATE 2 MG/ML
INJECTION INTRAVENOUS
Status: COMPLETED
Start: 2024-01-14 | End: 2024-01-14

## 2024-01-14 RX ORDER — PETROLATUM 420 MG/G
OINTMENT TOPICAL 2 TIMES DAILY PRN
Status: DISCONTINUED | OUTPATIENT
Start: 2024-01-14 | End: 2024-01-18 | Stop reason: HOSPADM

## 2024-01-14 RX ORDER — MIDAZOLAM HYDROCHLORIDE 1 MG/ML
3 INJECTION INTRAMUSCULAR; INTRAVENOUS ONCE
Status: COMPLETED | OUTPATIENT
Start: 2024-01-14 | End: 2024-01-14

## 2024-01-14 RX ORDER — DIMETHICONE, OXYBENZONE, AND PADIMATE O 2; 2.5; 6.6 G/100G; G/100G; G/100G
STICK TOPICAL PRN
Status: DISCONTINUED | OUTPATIENT
Start: 2024-01-14 | End: 2024-01-18 | Stop reason: HOSPADM

## 2024-01-14 RX ORDER — POTASSIUM CHLORIDE 7.45 MG/ML
10 INJECTION INTRAVENOUS
Status: COMPLETED | OUTPATIENT
Start: 2024-01-14 | End: 2024-01-14

## 2024-01-14 RX ORDER — SUCCINYLCHOLINE/SOD CL,ISO/PF 200MG/10ML
SYRINGE (ML) INTRAVENOUS
Status: DISPENSED
Start: 2024-01-14 | End: 2024-01-14

## 2024-01-14 RX ORDER — FENTANYL CITRATE-0.9 % NACL/PF 20 MCG/2ML
50 SYRINGE (ML) INTRAVENOUS ONCE
Status: COMPLETED | OUTPATIENT
Start: 2024-01-14 | End: 2024-01-14

## 2024-01-14 RX ORDER — FENTANYL CITRATE 50 UG/ML
INJECTION, SOLUTION INTRAMUSCULAR; INTRAVENOUS
Status: COMPLETED
Start: 2024-01-14 | End: 2024-01-14

## 2024-01-14 RX ORDER — NOREPINEPHRINE BITARTRATE 0.06 MG/ML
1-100 INJECTION, SOLUTION INTRAVENOUS CONTINUOUS
Status: DISCONTINUED | OUTPATIENT
Start: 2024-01-14 | End: 2024-01-18 | Stop reason: HOSPADM

## 2024-01-14 RX ADMIN — POTASSIUM CHLORIDE 10 MEQ: 7.46 INJECTION, SOLUTION INTRAVENOUS at 04:26

## 2024-01-14 RX ADMIN — FENTANYL CITRATE 25 MCG: 50 INJECTION INTRAMUSCULAR; INTRAVENOUS at 09:34

## 2024-01-14 RX ADMIN — DEXTROSE MONOHYDRATE: 50 INJECTION, SOLUTION INTRAVENOUS at 11:31

## 2024-01-14 RX ADMIN — FENTANYL CITRATE 50 MCG/HR: 0.05 INJECTION, SOLUTION INTRAMUSCULAR; INTRAVENOUS at 10:13

## 2024-01-14 RX ADMIN — LORAZEPAM 0.5 MG: 2 INJECTION INTRAMUSCULAR; INTRAVENOUS at 03:05

## 2024-01-14 RX ADMIN — ACETAMINOPHEN 650 MG: 325 TABLET ORAL at 15:32

## 2024-01-14 RX ADMIN — SODIUM CHLORIDE, PRESERVATIVE FREE 10 ML: 5 INJECTION INTRAVENOUS at 09:05

## 2024-01-14 RX ADMIN — TOCILIZUMAB 400 MG: 20 INJECTION, SOLUTION, CONCENTRATE INTRAVENOUS at 16:03

## 2024-01-14 RX ADMIN — CEFEPIME 1000 MG: 1 INJECTION, POWDER, FOR SOLUTION INTRAMUSCULAR; INTRAVENOUS at 11:30

## 2024-01-14 RX ADMIN — MINERAL OIL, PETROLATUM: 425; 568 OINTMENT OPHTHALMIC at 12:22

## 2024-01-14 RX ADMIN — SODIUM CHLORIDE, PRESERVATIVE FREE 40 MG: 5 INJECTION INTRAVENOUS at 09:46

## 2024-01-14 RX ADMIN — Medication 50 MCG: at 10:49

## 2024-01-14 RX ADMIN — ETOMIDATE 10 MG: 2 INJECTION INTRAVENOUS at 09:34

## 2024-01-14 RX ADMIN — FENTANYL CITRATE 125 MCG/HR: 0.05 INJECTION, SOLUTION INTRAMUSCULAR; INTRAVENOUS at 17:23

## 2024-01-14 RX ADMIN — BUDESONIDE 500 MCG: 0.5 SUSPENSION RESPIRATORY (INHALATION) at 18:21

## 2024-01-14 RX ADMIN — BUDESONIDE 500 MCG: 0.5 SUSPENSION RESPIRATORY (INHALATION) at 06:18

## 2024-01-14 RX ADMIN — FENTANYL CITRATE 25 MCG: 0.05 INJECTION, SOLUTION INTRAMUSCULAR; INTRAVENOUS at 09:34

## 2024-01-14 RX ADMIN — DEXAMETHASONE SODIUM PHOSPHATE 6 MG: 10 INJECTION INTRAMUSCULAR; INTRAVENOUS at 18:20

## 2024-01-14 RX ADMIN — IPRATROPIUM BROMIDE AND ALBUTEROL SULFATE 1 DOSE: .5; 2.5 SOLUTION RESPIRATORY (INHALATION) at 10:18

## 2024-01-14 RX ADMIN — MIDAZOLAM 3 MG: 1 INJECTION INTRAMUSCULAR; INTRAVENOUS at 09:57

## 2024-01-14 RX ADMIN — MIDAZOLAM 3 MG/HR: 5 INJECTION INTRAMUSCULAR; INTRAVENOUS at 10:11

## 2024-01-14 RX ADMIN — HEPARIN SODIUM 4180 UNITS: 1000 INJECTION INTRAVENOUS; SUBCUTANEOUS at 01:16

## 2024-01-14 RX ADMIN — POTASSIUM CHLORIDE 10 MEQ: 7.46 INJECTION, SOLUTION INTRAVENOUS at 03:36

## 2024-01-14 RX ADMIN — IPRATROPIUM BROMIDE AND ALBUTEROL SULFATE 1 DOSE: .5; 2.5 SOLUTION RESPIRATORY (INHALATION) at 14:21

## 2024-01-14 RX ADMIN — MIDAZOLAM HYDROCHLORIDE 3 MG: 1 INJECTION INTRAMUSCULAR; INTRAVENOUS at 09:57

## 2024-01-14 RX ADMIN — 0.12% CHLORHEXIDINE GLUCONATE 15 ML: 1.2 RINSE ORAL at 09:46

## 2024-01-14 RX ADMIN — DEXAMETHASONE SODIUM PHOSPHATE 4 MG: 4 INJECTION INTRA-ARTICULAR; INTRALESIONAL; INTRAMUSCULAR; INTRAVENOUS; SOFT TISSUE at 04:59

## 2024-01-14 RX ADMIN — CARBOXYMETHYLCELLULOSE SODIUM, GLYCERIN, AND POLYSORBATE 80 1 DROP: 5; 10; 5 SOLUTION/ DROPS OPHTHALMIC at 18:31

## 2024-01-14 RX ADMIN — DIPHENHYDRAMINE HYDROCHLORIDE 25 MG: 50 INJECTION INTRAMUSCULAR; INTRAVENOUS at 15:33

## 2024-01-14 RX ADMIN — POTASSIUM BICARBONATE 50 MEQ: 978 TABLET, EFFERVESCENT ORAL at 18:20

## 2024-01-14 RX ADMIN — AZITHROMYCIN MONOHYDRATE 500 MG: 500 INJECTION, POWDER, LYOPHILIZED, FOR SOLUTION INTRAVENOUS at 20:05

## 2024-01-14 RX ADMIN — IPRATROPIUM BROMIDE AND ALBUTEROL SULFATE 1 DOSE: .5; 2.5 SOLUTION RESPIRATORY (INHALATION) at 21:55

## 2024-01-14 RX ADMIN — CARBOXYMETHYLCELLULOSE SODIUM, GLYCERIN, AND POLYSORBATE 80 1 DROP: 5; 10; 5 SOLUTION/ DROPS OPHTHALMIC at 12:49

## 2024-01-14 RX ADMIN — 0.12% CHLORHEXIDINE GLUCONATE 15 ML: 1.2 RINSE ORAL at 21:47

## 2024-01-14 RX ADMIN — SODIUM CHLORIDE, PRESERVATIVE FREE 10 ML: 5 INJECTION INTRAVENOUS at 21:47

## 2024-01-14 RX ADMIN — CARBOXYMETHYLCELLULOSE SODIUM, GLYCERIN, AND POLYSORBATE 80 1 DROP: 5; 10; 5 SOLUTION/ DROPS OPHTHALMIC at 21:49

## 2024-01-14 RX ADMIN — IPRATROPIUM BROMIDE AND ALBUTEROL SULFATE 1 DOSE: .5; 2.5 SOLUTION RESPIRATORY (INHALATION) at 06:17

## 2024-01-14 RX ADMIN — INSULIN LISPRO 1 UNITS: 100 INJECTION, SOLUTION INTRAVENOUS; SUBCUTANEOUS at 18:24

## 2024-01-14 RX ADMIN — HEPARIN SODIUM AND DEXTROSE 18 UNITS/KG/HR: 10000; 5 INJECTION INTRAVENOUS at 01:22

## 2024-01-14 RX ADMIN — IPRATROPIUM BROMIDE AND ALBUTEROL SULFATE 1 DOSE: .5; 2.5 SOLUTION RESPIRATORY (INHALATION) at 18:21

## 2024-01-14 RX ADMIN — CARBOXYMETHYLCELLULOSE SODIUM, GLYCERIN, AND POLYSORBATE 80 1 DROP: 5; 10; 5 SOLUTION/ DROPS OPHTHALMIC at 15:11

## 2024-01-14 RX ADMIN — IPRATROPIUM BROMIDE AND ALBUTEROL SULFATE 1 DOSE: .5; 2.5 SOLUTION RESPIRATORY (INHALATION) at 02:09

## 2024-01-14 RX ADMIN — MIDAZOLAM 2 MG: 1 INJECTION INTRAMUSCULAR; INTRAVENOUS at 10:11

## 2024-01-14 RX ADMIN — ETOMIDATE 10 MG: 20 INJECTION, SOLUTION INTRAVENOUS at 09:34

## 2024-01-14 RX ADMIN — SUCCINYLCHOLINE CHLORIDE 40 MG: 20 INJECTION, SOLUTION INTRAMUSCULAR; INTRAVENOUS at 09:36

## 2024-01-14 RX ADMIN — Medication 6 MG/HR: at 23:21

## 2024-01-14 RX ADMIN — ETOMIDATE 10 MG: 20 INJECTION, SOLUTION INTRAVENOUS at 09:45

## 2024-01-14 RX ADMIN — CEFEPIME 1000 MG: 1 INJECTION, POWDER, FOR SOLUTION INTRAMUSCULAR; INTRAVENOUS at 23:19

## 2024-01-14 RX ADMIN — FENTANYL CITRATE 75 MCG: 50 INJECTION INTRAMUSCULAR; INTRAVENOUS at 09:49

## 2024-01-14 ASSESSMENT — PULMONARY FUNCTION TESTS
PIF_VALUE: 23
PIF_VALUE: 23
PIF_VALUE: 22
PIF_VALUE: 22
PIF_VALUE: 49
PIF_VALUE: 24
PIF_VALUE: 20
PIF_VALUE: 18
PIF_VALUE: 21
PIF_VALUE: 17
PIF_VALUE: 21
PIF_VALUE: 19
PIF_VALUE: 22
PIF_VALUE: 24
PIF_VALUE: 22
PIF_VALUE: 28
PIF_VALUE: 30

## 2024-01-14 ASSESSMENT — PAIN SCALES - GENERAL
PAINLEVEL_OUTOF10: 0

## 2024-01-14 NOTE — PROCEDURES
Central Line Insertion     Procedure: right internal jugular vein Triple Lumen Catheter placement.    Indications: poor peripheral access, hypovolemia, centrally administered medications, and need for frequent blood draws    Consent: Unable to be obtained due to the emergent nature of this procedure.    Number of sticks: 1    Number of Kits used: 1    Procedure: Time Out: Immediately prior to the procedure a \"timeout\" was called to verify the correct patient and procedure. The patient was place in the trendelenburg position and the skin over the right internal jugular vein was prepped with betadine and draped in a sterile fashion. Local anesthesia was obtained by infiltration using 1% Lidocaine without epinephrine.  With Ultrasound guidance a large bore needle was used to identify the vein, dark non pulsatile blood returned. The guide wire was then inserted through the needle with minimal resistance. 2 mm nick was made in the skin beside the guidewire. Then a dilator was inserted and removed. A triple lumen catheter was then inserted into the vessel over the guide wire using the Seldinger technique to the 15 cm landon.  All ports showed good, free flowing blood return and were flushed with saline solution.  The catheter was then securely fastened to the skin with sutures and covered with a bio patch and sterile dressing.  A post procedure X-ray was ordered and is still pending at this time.    Complications: None   The patient tolerated the procedure well.    Estimated blood loss: 1 ml.    SHAN Petty - MICHEL   1/14/2024  11:05 AM

## 2024-01-14 NOTE — PROCEDURES
Department of Internal Medicine  Division of Pulmonary, Critical Care & Sleep Medicine  [facility]    PROCEDURE NOTE    PROCEDURE:  Arterial line placement.     INDICATIONS:  Continuous hemodynamic monitoring of vital signs.      PERFORMED BY:  SHAN Petty CNP    ANESTHESIA:  Local infiltration of 1% lidocaine.     CONSENT:    Emergent, unable to obtain consent, called  with no answer.    PROCEDURAL TECHNIQUE:  Procedure was done using strict aseptic technique. Left radial site was cleaned with chloraprep and draped.  Radial artery was identified, then Lidocaine 1% was infiltrated locally.  Radial arterial line was inserted, a good blood flow was obtained, after which guidewire was inserted all the way with no resistance. Then the canula was inserted and needle with guidewire was withdrawn. There was no blood return noted once the guidewire was removed. A hematoma was noted. Pressure was held for 5 minutes and a pressure dressing was applied. Capillary refill <3 with palpable radial pulse to the right radial. Will re-evaluate the need for an arterial line.     COMPLICATIONS: Hematoma to right wrist, unsuccessful attempt.       SHAN Petty CNP

## 2024-01-14 NOTE — PROCEDURES
Date: 1/14/2024  Time: 0940  Patient identity confirmed:  Yes  Indications: Hypoxemic respiratory failure  Sedation Standing Order Used: No  Preoxygenation: BVM with 100% O2  Laryngoscope size and type Glidescope attempted x 3, intubated with Mohamud  Airway introducer used: No  Evac: No  Tube size:a 7.5 cuffed  Number of attempts:4  Cords visualized:  [x] Clearly  [] Poorly  Breath sounds present bilaterally: Yes   ETCO2 41 mmhg  ETT to lip: 22  Tube secured with: tube nunez  Chest x-ray ordered: Yes  Difficulties encountered: Initially attempted intubation with Glidescope x 3, screen dark, unable to properly visualize anatomy. Easily able to ventilate with BVM. Easily able to obtain grade 1 view with Mohamud    Difficult Airway: No    Performed by: Yamileth Serrano DO,

## 2024-01-15 ENCOUNTER — APPOINTMENT (OUTPATIENT)
Dept: GENERAL RADIOLOGY | Age: 79
DRG: 870 | End: 2024-01-15
Payer: MEDICARE

## 2024-01-15 LAB
AADO2: 561.3 MMHG
AADO2: 562.6 MMHG
AADO2: 566.9 MMHG
ALBUMIN SERPL-MCNC: 3.2 G/DL (ref 3.5–5.2)
ALP SERPL-CCNC: 186 U/L (ref 35–104)
ALT SERPL-CCNC: 63 U/L (ref 0–32)
ANION GAP SERPL CALCULATED.3IONS-SCNC: 10 MMOL/L (ref 7–16)
ANION GAP SERPL CALCULATED.3IONS-SCNC: 8 MMOL/L (ref 7–16)
ANION GAP SERPL CALCULATED.3IONS-SCNC: 9 MMOL/L (ref 7–16)
AST SERPL-CCNC: 53 U/L (ref 0–31)
B.E.: -3.1 MMOL/L (ref -3–3)
B.E.: -3.7 MMOL/L (ref -3–3)
B.E.: -4.2 MMOL/L (ref -3–3)
BASOPHILS # BLD: 0 K/UL (ref 0–0.2)
BASOPHILS NFR BLD: 0 % (ref 0–2)
BILIRUB SERPL-MCNC: 0.4 MG/DL (ref 0–1.2)
BUN SERPL-MCNC: 47 MG/DL (ref 6–23)
BUN SERPL-MCNC: 58 MG/DL (ref 6–23)
BUN SERPL-MCNC: 69 MG/DL (ref 6–23)
CALCIUM SERPL-MCNC: 8.5 MG/DL (ref 8.6–10.2)
CALCIUM SERPL-MCNC: 8.7 MG/DL (ref 8.6–10.2)
CALCIUM SERPL-MCNC: 8.9 MG/DL (ref 8.6–10.2)
CHLORIDE SERPL-SCNC: 116 MMOL/L (ref 98–107)
CHLORIDE SERPL-SCNC: 118 MMOL/L (ref 98–107)
CHLORIDE SERPL-SCNC: 121 MMOL/L (ref 98–107)
CO2 SERPL-SCNC: 22 MMOL/L (ref 22–29)
CO2 SERPL-SCNC: 24 MMOL/L (ref 22–29)
CO2 SERPL-SCNC: 25 MMOL/L (ref 22–29)
COHB: 0.4 % (ref 0–1.5)
COHB: 0.6 % (ref 0–1.5)
COHB: 0.8 % (ref 0–1.5)
CREAT SERPL-MCNC: 1 MG/DL (ref 0.5–1)
CREAT SERPL-MCNC: 1.2 MG/DL (ref 0.5–1)
CREAT SERPL-MCNC: 1.4 MG/DL (ref 0.5–1)
CRITICAL: ABNORMAL
DATE ANALYZED: ABNORMAL
DATE OF COLLECTION: ABNORMAL
EKG ATRIAL RATE: 86 BPM
EKG ATRIAL RATE: 88 BPM
EKG P AXIS: 61 DEGREES
EKG P AXIS: 70 DEGREES
EKG P-R INTERVAL: 124 MS
EKG P-R INTERVAL: 128 MS
EKG Q-T INTERVAL: 418 MS
EKG Q-T INTERVAL: 426 MS
EKG QRS DURATION: 128 MS
EKG QRS DURATION: 138 MS
EKG QTC CALCULATION (BAZETT): 500 MS
EKG QTC CALCULATION (BAZETT): 515 MS
EKG R AXIS: -55 DEGREES
EKG R AXIS: -71 DEGREES
EKG T AXIS: 108 DEGREES
EKG T AXIS: 99 DEGREES
EKG VENTRICULAR RATE: 86 BPM
EKG VENTRICULAR RATE: 88 BPM
EOSINOPHIL # BLD: 0 K/UL (ref 0.05–0.5)
EOSINOPHILS RELATIVE PERCENT: 0 % (ref 0–6)
ERYTHROCYTE [DISTWIDTH] IN BLOOD BY AUTOMATED COUNT: 14.7 % (ref 11.5–15)
FIO2: 100 %
GFR SERPL CREATININE-BSD FRML MDRD: 38 ML/MIN/1.73M2
GFR SERPL CREATININE-BSD FRML MDRD: 48 ML/MIN/1.73M2
GFR SERPL CREATININE-BSD FRML MDRD: 59 ML/MIN/1.73M2
GLUCOSE BLD-MCNC: 168 MG/DL (ref 74–99)
GLUCOSE BLD-MCNC: 173 MG/DL (ref 74–99)
GLUCOSE BLD-MCNC: 174 MG/DL (ref 74–99)
GLUCOSE BLD-MCNC: 208 MG/DL (ref 74–99)
GLUCOSE BLD-MCNC: 213 MG/DL (ref 74–99)
GLUCOSE BLD-MCNC: 224 MG/DL (ref 74–99)
GLUCOSE SERPL-MCNC: 169 MG/DL (ref 74–99)
GLUCOSE SERPL-MCNC: 216 MG/DL (ref 74–99)
GLUCOSE SERPL-MCNC: 243 MG/DL (ref 74–99)
HCO3: 21.8 MMOL/L (ref 22–26)
HCO3: 22 MMOL/L (ref 22–26)
HCO3: 23.3 MMOL/L (ref 22–26)
HCT VFR BLD AUTO: 35.9 % (ref 34–48)
HGB BLD-MCNC: 11.6 G/DL (ref 11.5–15.5)
HHB: 3.9 % (ref 0–5)
HHB: 4.4 % (ref 0–5)
HHB: 6.2 % (ref 0–5)
L PNEUMO1 AG UR QL IA.RAPID: NEGATIVE
LAB: ABNORMAL
LYMPHOCYTES NFR BLD: 0.61 K/UL (ref 1.5–4)
LYMPHOCYTES RELATIVE PERCENT: 3 % (ref 20–42)
Lab: 1157
Lab: 1618
Lab: 514
MAGNESIUM SERPL-MCNC: 2.9 MG/DL (ref 1.6–2.6)
MCH RBC QN AUTO: 32.6 PG (ref 26–35)
MCHC RBC AUTO-ENTMCNC: 32.3 G/DL (ref 32–34.5)
MCV RBC AUTO: 100.8 FL (ref 80–99.9)
METHB: 0.3 % (ref 0–1.5)
MODE: AC
MONOCYTES NFR BLD: 0.61 K/UL (ref 0.1–0.95)
MONOCYTES NFR BLD: 3 % (ref 2–12)
MYELOCYTES ABSOLUTE COUNT: 0.2 K/UL
MYELOCYTES: 1 %
NEUTROPHILS NFR BLD: 94 % (ref 43–80)
NEUTS SEG NFR BLD: 21.68 K/UL (ref 1.8–7.3)
NUCLEATED RED BLOOD CELLS: 8 PER 100 WBC
O2 CONTENT: 15.6 ML/DL
O2 CONTENT: 15.6 ML/DL
O2 CONTENT: 16.2 ML/DL
O2 SATURATION: 93.7 % (ref 92–98.5)
O2 SATURATION: 95.6 % (ref 92–98.5)
O2 SATURATION: 96.1 % (ref 92–98.5)
O2HB: 92.9 % (ref 94–97)
O2HB: 94.5 % (ref 94–97)
O2HB: 95.4 % (ref 94–97)
OPERATOR ID: ABNORMAL
PARTIAL THROMBOPLASTIN TIME: 102 SEC (ref 24.5–35.1)
PARTIAL THROMBOPLASTIN TIME: 166.3 SEC (ref 24.5–35.1)
PARTIAL THROMBOPLASTIN TIME: 72.2 SEC (ref 24.5–35.1)
PATIENT TEMP: 37 C
PCO2: 41.3 MMHG (ref 35–45)
PCO2: 45.1 MMHG (ref 35–45)
PCO2: 47.3 MMHG (ref 35–45)
PEEP/CPAP: 12 CMH2O
PEEP/CPAP: 12 CMH2O
PEEP/CPAP: 8 CMH2O
PFO2: 0.74 MMHG/%
PFO2: 0.82 MMHG/%
PFO2: 0.84 MMHG/%
PH BLOOD GAS: 7.31 (ref 7.35–7.45)
PH BLOOD GAS: 7.31 (ref 7.35–7.45)
PH BLOOD GAS: 7.34 (ref 7.35–7.45)
PLATELET # BLD AUTO: 268 K/UL (ref 130–450)
PMV BLD AUTO: 10.9 FL (ref 7–12)
PO2: 73.8 MMHG (ref 75–100)
PO2: 81.6 MMHG (ref 75–100)
PO2: 84.1 MMHG (ref 75–100)
POTASSIUM SERPL-SCNC: 3.8 MMOL/L (ref 3.5–5)
POTASSIUM SERPL-SCNC: 3.9 MMOL/L (ref 3.5–5)
POTASSIUM SERPL-SCNC: 4 MMOL/L (ref 3.5–5)
PROT SERPL-MCNC: 6.2 G/DL (ref 6.4–8.3)
RBC # BLD AUTO: 3.56 M/UL (ref 3.5–5.5)
RBC # BLD: ABNORMAL 10*6/UL
RI(T): 6.69
RI(T): 6.88
RI(T): 7.68
RR MECHANICAL: 24 B/MIN
RR MECHANICAL: 28 B/MIN
RR MECHANICAL: 28 B/MIN
S PNEUM AG SPEC QL: NEGATIVE
SODIUM SERPL-SCNC: 147 MMOL/L (ref 132–146)
SODIUM SERPL-SCNC: 150 MMOL/L (ref 132–146)
SODIUM SERPL-SCNC: 156 MMOL/L (ref 132–146)
SOURCE, BLOOD GAS: ABNORMAL
SPECIMEN SOURCE: NORMAL
THB: 11.6 G/DL (ref 11.5–16.5)
THB: 11.9 G/DL (ref 11.5–16.5)
THB: 12.1 G/DL (ref 11.5–16.5)
TIME ANALYZED: 1159
TIME ANALYZED: 1619
TIME ANALYZED: 517
VT MECHANICAL: 300 ML
VT MECHANICAL: 300 ML
VT MECHANICAL: 400 ML
WBC OTHER # BLD: 23.1 K/UL (ref 4.5–11.5)

## 2024-01-15 PROCEDURE — 82962 GLUCOSE BLOOD TEST: CPT

## 2024-01-15 PROCEDURE — 6360000002 HC RX W HCPCS

## 2024-01-15 PROCEDURE — 2580000003 HC RX 258: Performed by: INTERNAL MEDICINE

## 2024-01-15 PROCEDURE — 2580000003 HC RX 258: Performed by: FAMILY MEDICINE

## 2024-01-15 PROCEDURE — 80053 COMPREHEN METABOLIC PANEL: CPT

## 2024-01-15 PROCEDURE — 6370000000 HC RX 637 (ALT 250 FOR IP): Performed by: INTERNAL MEDICINE

## 2024-01-15 PROCEDURE — 2580000003 HC RX 258

## 2024-01-15 PROCEDURE — A4216 STERILE WATER/SALINE, 10 ML: HCPCS

## 2024-01-15 PROCEDURE — 93010 ELECTROCARDIOGRAM REPORT: CPT | Performed by: INTERNAL MEDICINE

## 2024-01-15 PROCEDURE — 99232 SBSQ HOSP IP/OBS MODERATE 35: CPT | Performed by: FAMILY MEDICINE

## 2024-01-15 PROCEDURE — 2000000000 HC ICU R&B

## 2024-01-15 PROCEDURE — 83735 ASSAY OF MAGNESIUM: CPT

## 2024-01-15 PROCEDURE — 6360000002 HC RX W HCPCS: Performed by: FAMILY MEDICINE

## 2024-01-15 PROCEDURE — 93005 ELECTROCARDIOGRAM TRACING: CPT

## 2024-01-15 PROCEDURE — 36415 COLL VENOUS BLD VENIPUNCTURE: CPT

## 2024-01-15 PROCEDURE — 99291 CRITICAL CARE FIRST HOUR: CPT | Performed by: INTERNAL MEDICINE

## 2024-01-15 PROCEDURE — 2500000003 HC RX 250 WO HCPCS

## 2024-01-15 PROCEDURE — 6370000000 HC RX 637 (ALT 250 FOR IP)

## 2024-01-15 PROCEDURE — 94640 AIRWAY INHALATION TREATMENT: CPT

## 2024-01-15 PROCEDURE — 85730 THROMBOPLASTIN TIME PARTIAL: CPT

## 2024-01-15 PROCEDURE — 80048 BASIC METABOLIC PNL TOTAL CA: CPT

## 2024-01-15 PROCEDURE — 85025 COMPLETE CBC W/AUTO DIFF WBC: CPT

## 2024-01-15 PROCEDURE — 36592 COLLECT BLOOD FROM PICC: CPT

## 2024-01-15 PROCEDURE — C9113 INJ PANTOPRAZOLE SODIUM, VIA: HCPCS

## 2024-01-15 PROCEDURE — 6360000002 HC RX W HCPCS: Performed by: SPECIALIST

## 2024-01-15 PROCEDURE — 2580000003 HC RX 258: Performed by: SPECIALIST

## 2024-01-15 PROCEDURE — 94003 VENT MGMT INPAT SUBQ DAY: CPT

## 2024-01-15 PROCEDURE — 6360000002 HC RX W HCPCS: Performed by: INTERNAL MEDICINE

## 2024-01-15 PROCEDURE — 71045 X-RAY EXAM CHEST 1 VIEW: CPT

## 2024-01-15 PROCEDURE — 82805 BLOOD GASES W/O2 SATURATION: CPT

## 2024-01-15 RX ORDER — POTASSIUM CHLORIDE 29.8 MG/ML
20 INJECTION INTRAVENOUS ONCE
Status: COMPLETED | OUTPATIENT
Start: 2024-01-15 | End: 2024-01-15

## 2024-01-15 RX ORDER — HEPARIN SODIUM 10000 [USP'U]/100ML
5-30 INJECTION, SOLUTION INTRAVENOUS CONTINUOUS
Status: DISCONTINUED | OUTPATIENT
Start: 2024-01-15 | End: 2024-01-16

## 2024-01-15 RX ORDER — HEPARIN SODIUM 1000 [USP'U]/ML
40 INJECTION, SOLUTION INTRAVENOUS; SUBCUTANEOUS PRN
Status: DISCONTINUED | OUTPATIENT
Start: 2024-01-15 | End: 2024-01-16

## 2024-01-15 RX ORDER — HEPARIN SODIUM 1000 [USP'U]/ML
80 INJECTION, SOLUTION INTRAVENOUS; SUBCUTANEOUS PRN
Status: DISCONTINUED | OUTPATIENT
Start: 2024-01-15 | End: 2024-01-16

## 2024-01-15 RX ORDER — BISACODYL 10 MG
10 SUPPOSITORY, RECTAL RECTAL DAILY PRN
Status: DISCONTINUED | OUTPATIENT
Start: 2024-01-15 | End: 2024-01-18 | Stop reason: HOSPADM

## 2024-01-15 RX ADMIN — Medication 4 MG/HR: at 20:35

## 2024-01-15 RX ADMIN — CARBOXYMETHYLCELLULOSE SODIUM, GLYCERIN, AND POLYSORBATE 80 1 DROP: 5; 10; 5 SOLUTION/ DROPS OPHTHALMIC at 06:29

## 2024-01-15 RX ADMIN — SODIUM CHLORIDE, PRESERVATIVE FREE 10 ML: 5 INJECTION INTRAVENOUS at 08:34

## 2024-01-15 RX ADMIN — POTASSIUM CHLORIDE 20 MEQ: 29.8 INJECTION, SOLUTION INTRAVENOUS at 06:33

## 2024-01-15 RX ADMIN — IPRATROPIUM BROMIDE AND ALBUTEROL SULFATE 1 DOSE: .5; 2.5 SOLUTION RESPIRATORY (INHALATION) at 16:50

## 2024-01-15 RX ADMIN — INSULIN LISPRO 1 UNITS: 100 INJECTION, SOLUTION INTRAVENOUS; SUBCUTANEOUS at 18:08

## 2024-01-15 RX ADMIN — CEFEPIME 1000 MG: 1 INJECTION, POWDER, FOR SOLUTION INTRAMUSCULAR; INTRAVENOUS at 22:42

## 2024-01-15 RX ADMIN — IPRATROPIUM BROMIDE AND ALBUTEROL SULFATE 1 DOSE: .5; 2.5 SOLUTION RESPIRATORY (INHALATION) at 13:08

## 2024-01-15 RX ADMIN — MINERAL OIL, PETROLATUM: 425; 568 OINTMENT OPHTHALMIC at 16:05

## 2024-01-15 RX ADMIN — SODIUM CHLORIDE 5 MCG/MIN: 9 INJECTION, SOLUTION INTRAVENOUS at 09:09

## 2024-01-15 RX ADMIN — CEFEPIME 1000 MG: 1 INJECTION, POWDER, FOR SOLUTION INTRAMUSCULAR; INTRAVENOUS at 11:16

## 2024-01-15 RX ADMIN — IPRATROPIUM BROMIDE AND ALBUTEROL SULFATE 1 DOSE: .5; 2.5 SOLUTION RESPIRATORY (INHALATION) at 21:46

## 2024-01-15 RX ADMIN — MINERAL OIL, PETROLATUM: 425; 568 OINTMENT OPHTHALMIC at 22:42

## 2024-01-15 RX ADMIN — FENTANYL CITRATE 150 MCG/HR: 0.05 INJECTION, SOLUTION INTRAMUSCULAR; INTRAVENOUS at 01:17

## 2024-01-15 RX ADMIN — MINERAL OIL, PETROLATUM: 425; 568 OINTMENT OPHTHALMIC at 06:29

## 2024-01-15 RX ADMIN — 0.12% CHLORHEXIDINE GLUCONATE 15 ML: 1.2 RINSE ORAL at 08:33

## 2024-01-15 RX ADMIN — INSULIN LISPRO 1 UNITS: 100 INJECTION, SOLUTION INTRAVENOUS; SUBCUTANEOUS at 02:30

## 2024-01-15 RX ADMIN — MINERAL OIL, PETROLATUM: 425; 568 OINTMENT OPHTHALMIC at 02:32

## 2024-01-15 RX ADMIN — SODIUM CHLORIDE, PRESERVATIVE FREE 40 MG: 5 INJECTION INTRAVENOUS at 08:33

## 2024-01-15 RX ADMIN — BUDESONIDE 500 MCG: 0.5 SUSPENSION RESPIRATORY (INHALATION) at 05:32

## 2024-01-15 RX ADMIN — INSULIN LISPRO 1 UNITS: 100 INJECTION, SOLUTION INTRAVENOUS; SUBCUTANEOUS at 13:38

## 2024-01-15 RX ADMIN — IPRATROPIUM BROMIDE AND ALBUTEROL SULFATE 1 DOSE: .5; 2.5 SOLUTION RESPIRATORY (INHALATION) at 08:50

## 2024-01-15 RX ADMIN — CARBOXYMETHYLCELLULOSE SODIUM, GLYCERIN, AND POLYSORBATE 80 1 DROP: 5; 10; 5 SOLUTION/ DROPS OPHTHALMIC at 11:18

## 2024-01-15 RX ADMIN — MINERAL OIL, PETROLATUM: 425; 568 OINTMENT OPHTHALMIC at 18:17

## 2024-01-15 RX ADMIN — IPRATROPIUM BROMIDE AND ALBUTEROL SULFATE 1 DOSE: .5; 2.5 SOLUTION RESPIRATORY (INHALATION) at 01:44

## 2024-01-15 RX ADMIN — FENTANYL CITRATE 140 MCG/HR: 0.05 INJECTION, SOLUTION INTRAMUSCULAR; INTRAVENOUS at 09:18

## 2024-01-15 RX ADMIN — FENTANYL CITRATE 75 MCG/HR: 0.05 INJECTION, SOLUTION INTRAMUSCULAR; INTRAVENOUS at 20:35

## 2024-01-15 RX ADMIN — HEPARIN SODIUM AND DEXTROSE 18 UNITS/KG/HR: 10000; 5 INJECTION INTRAVENOUS at 04:28

## 2024-01-15 RX ADMIN — DEXTROSE MONOHYDRATE: 50 INJECTION, SOLUTION INTRAVENOUS at 13:36

## 2024-01-15 RX ADMIN — 0.12% CHLORHEXIDINE GLUCONATE 15 ML: 1.2 RINSE ORAL at 20:18

## 2024-01-15 RX ADMIN — DEXTROSE MONOHYDRATE: 50 INJECTION, SOLUTION INTRAVENOUS at 01:08

## 2024-01-15 RX ADMIN — DOCUSATE SODIUM 100 MG: 50 LIQUID ORAL at 20:18

## 2024-01-15 RX ADMIN — AZITHROMYCIN MONOHYDRATE 500 MG: 500 INJECTION, POWDER, LYOPHILIZED, FOR SOLUTION INTRAVENOUS at 20:19

## 2024-01-15 RX ADMIN — DEXAMETHASONE SODIUM PHOSPHATE 6 MG: 10 INJECTION INTRAMUSCULAR; INTRAVENOUS at 18:08

## 2024-01-15 RX ADMIN — IPRATROPIUM BROMIDE AND ALBUTEROL SULFATE 1 DOSE: .5; 2.5 SOLUTION RESPIRATORY (INHALATION) at 05:32

## 2024-01-15 RX ADMIN — DOCUSATE SODIUM 100 MG: 50 LIQUID ORAL at 11:15

## 2024-01-15 RX ADMIN — BUDESONIDE 500 MCG: 0.5 SUSPENSION RESPIRATORY (INHALATION) at 16:50

## 2024-01-15 ASSESSMENT — PULMONARY FUNCTION TESTS
PIF_VALUE: 25
PIF_VALUE: 23
PIF_VALUE: 35
PIF_VALUE: 23
PIF_VALUE: 24
PIF_VALUE: 27
PIF_VALUE: 33
PIF_VALUE: 35
PIF_VALUE: 34
PIF_VALUE: 26
PIF_VALUE: 34
PIF_VALUE: 18
PIF_VALUE: 30
PIF_VALUE: 33
PIF_VALUE: 28
PIF_VALUE: 32
PIF_VALUE: 22
PIF_VALUE: 32
PIF_VALUE: 33
PIF_VALUE: 33
PIF_VALUE: 35
PIF_VALUE: 31
PIF_VALUE: 23
PIF_VALUE: 23
PIF_VALUE: 32
PIF_VALUE: 33
PIF_VALUE: 30
PIF_VALUE: 32
PIF_VALUE: 23
PIF_VALUE: 22
PIF_VALUE: 13
PIF_VALUE: 24
PIF_VALUE: 32

## 2024-01-15 ASSESSMENT — PAIN SCALES - GENERAL
PAINLEVEL_OUTOF10: 0

## 2024-01-15 NOTE — ACP (ADVANCE CARE PLANNING)
Advance Care Planning     Advance Care Planning Activator (Inpatient)  Conversation Note      Date of ACP Conversation: 1/15/2024     Conversation Conducted with: Patient with Decision Making Capacity   Healthcare Decision Maker: Next of Kin by law (only applies in absence of above) (name)  Venkatesh Michaels- he is alert and oriented and is long term care at the [facility]- confirmed no mind altering medications with [facility] at [facility] and pts  Venkatesh was very appropriate.     ACP Activator: Dayanna Vasquez RN-BC    Health Care Decision Maker:     Current Designated Health Care Decision Maker:     Primary Decision Maker: Venkatesh Michaels - Spouse - [phone]    Secondary Decision Maker: DONVIOLETTA MENDEZ - Child - [phone]      Today we documented Decision Maker(s) consistent with Legal Next of Kin hierarchy.    Care Preferences    Ventilation:  \"If you were in your present state of health and suddenly became very ill and were unable to breathe on your own, what would your preference be about the use of a ventilator (breathing machine) if it were available to you?\"      Would the patient desire the use of ventilator (breathing machine)?: yes- short term ventilator     \"If your health worsens and it becomes clear that your chance of recovery is unlikely, what would your preference be about the use of a ventilator (breathing machine) if it were available to you?\"     Would the patient desire the use of ventilator (breathing machine)?: No- no long term- DNR-CCA only       Resuscitation  \"CPR works best to restart the heart when there is a sudden event, like a heart attack, in someone who is otherwise healthy. Unfortunately, CPR does not typically restart the heart for people who have serious health conditions or who are very sick.\"    \"In the event your heart stopped as a result of an underlying serious health condition, would you want attempts to be made to restart your heart

## 2024-01-15 NOTE — CARE COORDINATION
Case Management Assessment  Initial Evaluation    Date/Time of Evaluation: 1/15/2024 12:21 PM  Assessment Completed by: Dayanna Vasquez RN    If patient is discharged prior to next notation, then this note serves as note for discharge by case management.    Patient Name: Harsh Michaels                   YOB: 1945  Diagnosis: Hypoxia [R09.02]  KENDALL (acute kidney injury) (HCC) [N17.9]  Pneumonia due to infectious organism, unspecified laterality, unspecified part of lung [J18.9]  Pneumonia due to COVID-19 virus [U07.1, J12.82]  COVID-19 [U07.1]                   Date / Time: 1/13/2024 11:52 AM    Patient Admission Status: Inpatient   Readmission Risk (Low < 19, Mod (19-27), High > 27): Readmission Risk Score: 16.2    Current PCP: Kael Reynoso, DO  PCP verified by CM? Yes    Chart Reviewed: Yes      History Provided by: Significant Other  Patient Orientation: Unable to Assess, Sedated, Other (see comment) (icu, vent, sedation)    Patient Cognition: Other (see comment) (icu vent)    Hospitalization in the last 30 days (Readmission):  Yes   Readmission Assessment  Number of Days since last admission?: 8-30 days  Previous Disposition: SNF  Who is being Interviewed: Caregiver (pts  legal nok)  What was the patient's/caregiver's perception as to why they think they needed to return back to the hospital?: Other (Comment) (pt from same SNF where her  resides long term)  Did you visit your Primary Care Physician after you left the hospital, before you returned this time?: Yes  Did you see a specialist, such as Cardiac, Pulmonary, Orthopedic Physician, etc. after you left the hospital?: No  Who advised the patient to return to the hospital?: Caregiver, Skilled Unit  Does the patient report anything that got in the way of taking their medications?: No (per facility)  In our efforts to provide the best possible care to you and others like you, can you think of anything that we could have done to help

## 2024-01-16 ENCOUNTER — APPOINTMENT (OUTPATIENT)
Dept: CT IMAGING | Age: 79
DRG: 870 | End: 2024-01-16
Payer: MEDICARE

## 2024-01-16 ENCOUNTER — APPOINTMENT (OUTPATIENT)
Dept: GENERAL RADIOLOGY | Age: 79
DRG: 870 | End: 2024-01-16
Payer: MEDICARE

## 2024-01-16 LAB
ALBUMIN SERPL-MCNC: 3.1 G/DL (ref 3.5–5.2)
ALLEN TEST: POSITIVE
ALP SERPL-CCNC: 189 U/L (ref 35–104)
ALT SERPL-CCNC: 122 U/L (ref 0–32)
ANION GAP SERPL CALCULATED.3IONS-SCNC: 6 MMOL/L (ref 7–16)
ANION GAP SERPL CALCULATED.3IONS-SCNC: 6 MMOL/L (ref 7–16)
ANION GAP SERPL CALCULATED.3IONS-SCNC: 7 MMOL/L (ref 7–16)
ANION GAP SERPL CALCULATED.3IONS-SCNC: 7 MMOL/L (ref 7–16)
AST SERPL-CCNC: 134 U/L (ref 0–31)
BASOPHILS # BLD: 0 K/UL (ref 0–0.2)
BASOPHILS NFR BLD: 0 % (ref 0–2)
BILIRUB SERPL-MCNC: 0.4 MG/DL (ref 0–1.2)
BUN SERPL-MCNC: 30 MG/DL (ref 6–23)
BUN SERPL-MCNC: 34 MG/DL (ref 6–23)
BUN SERPL-MCNC: 39 MG/DL (ref 6–23)
BUN SERPL-MCNC: 45 MG/DL (ref 6–23)
CALCIUM SERPL-MCNC: 8.4 MG/DL (ref 8.6–10.2)
CALCIUM SERPL-MCNC: 8.6 MG/DL (ref 8.6–10.2)
CALCIUM SERPL-MCNC: 8.7 MG/DL (ref 8.6–10.2)
CALCIUM SERPL-MCNC: 8.8 MG/DL (ref 8.6–10.2)
CHLORIDE SERPL-SCNC: 114 MMOL/L (ref 98–107)
CHLORIDE SERPL-SCNC: 115 MMOL/L (ref 98–107)
CHLORIDE SERPL-SCNC: 116 MMOL/L (ref 98–107)
CHLORIDE SERPL-SCNC: 120 MMOL/L (ref 98–107)
CO2 SERPL-SCNC: 24 MMOL/L (ref 22–29)
CO2 SERPL-SCNC: 25 MMOL/L (ref 22–29)
CREAT SERPL-MCNC: 0.6 MG/DL (ref 0.5–1)
CREAT SERPL-MCNC: 0.8 MG/DL (ref 0.5–1)
CREAT SERPL-MCNC: 0.8 MG/DL (ref 0.5–1)
CREAT SERPL-MCNC: 0.9 MG/DL (ref 0.5–1)
EOSINOPHIL # BLD: 0 K/UL (ref 0.05–0.5)
EOSINOPHILS RELATIVE PERCENT: 0 % (ref 0–6)
ERYTHROCYTE [DISTWIDTH] IN BLOOD BY AUTOMATED COUNT: 15.2 % (ref 11.5–15)
FIO2: 100
GFR SERPL CREATININE-BSD FRML MDRD: >60 ML/MIN/1.73M2
GLUCOSE BLD-MCNC: 149 MG/DL (ref 74–99)
GLUCOSE BLD-MCNC: 153 MG/DL (ref 74–99)
GLUCOSE BLD-MCNC: 158 MG/DL (ref 74–99)
GLUCOSE BLD-MCNC: 175 MG/DL (ref 74–99)
GLUCOSE BLD-MCNC: 179 MG/DL (ref 74–99)
GLUCOSE SERPL-MCNC: 117 MG/DL (ref 74–99)
GLUCOSE SERPL-MCNC: 178 MG/DL (ref 74–99)
GLUCOSE SERPL-MCNC: 184 MG/DL (ref 74–99)
GLUCOSE SERPL-MCNC: 193 MG/DL (ref 74–99)
HCT VFR BLD AUTO: 32.6 % (ref 34–48)
HGB BLD-MCNC: 10.6 G/DL (ref 11.5–15.5)
LYMPHOCYTES NFR BLD: 0.82 K/UL (ref 1.5–4)
LYMPHOCYTES RELATIVE PERCENT: 3 % (ref 20–42)
MAGNESIUM SERPL-MCNC: 2.7 MG/DL (ref 1.6–2.6)
MCH RBC QN AUTO: 33.1 PG (ref 26–35)
MCHC RBC AUTO-ENTMCNC: 32.5 G/DL (ref 32–34.5)
MCV RBC AUTO: 101.9 FL (ref 80–99.9)
MICROORGANISM SPEC CULT: NORMAL
MODE: AC
MONOCYTES NFR BLD: 0.54 K/UL (ref 0.1–0.95)
MONOCYTES NFR BLD: 2 % (ref 2–12)
NEGATIVE BASE EXCESS, ART: 3.2 MMOL/L
NEUTROPHILS NFR BLD: 96 % (ref 43–80)
NEUTS SEG NFR BLD: 29.64 K/UL (ref 1.8–7.3)
NUCLEATED RED BLOOD CELLS: 9 PER 100 WBC
O2 DELIVERY DEVICE: ABNORMAL
PARTIAL THROMBOPLASTIN TIME: 71.7 SEC (ref 24.5–35.1)
PARTIAL THROMBOPLASTIN TIME: 71.8 SEC (ref 24.5–35.1)
PARTIAL THROMBOPLASTIN TIME: 75.1 SEC (ref 24.5–35.1)
PATIENT TEMP: 37
PEEP: 12
PHOSPHATE SERPL-MCNC: 2.1 MG/DL (ref 2.5–4.5)
PLATELET, FLUORESCENCE: 269 K/UL (ref 130–450)
PMV BLD AUTO: 11.4 FL (ref 7–12)
POC HCO3: 22.4 MMOL/L (ref 22–26)
POC O2 SATURATION: 97.8 % (ref 92–98.5)
POC PCO2 TEMP: 41.3 MM HG
POC PCO2: 41.3 MM HG (ref 35–45)
POC PH TEMP: 7.34
POC PH: 7.34 (ref 7.35–7.45)
POC PO2 TEMP: 106.4 MM HG
POC PO2: 106.4 MM HG (ref 60–80)
POTASSIUM SERPL-SCNC: 4.1 MMOL/L (ref 3.5–5)
POTASSIUM SERPL-SCNC: 4.2 MMOL/L (ref 3.5–5)
POTASSIUM SERPL-SCNC: 4.3 MMOL/L (ref 3.5–5)
POTASSIUM SERPL-SCNC: 4.3 MMOL/L (ref 3.5–5)
PROT SERPL-MCNC: 5.8 G/DL (ref 6.4–8.3)
RBC # BLD AUTO: 3.2 M/UL (ref 3.5–5.5)
RBC # BLD: ABNORMAL 10*6/UL
SAMPLE SITE: ABNORMAL
SET RATE, POC: 28
SODIUM SERPL-SCNC: 145 MMOL/L (ref 132–146)
SODIUM SERPL-SCNC: 147 MMOL/L (ref 132–146)
SODIUM SERPL-SCNC: 147 MMOL/L (ref 132–146)
SODIUM SERPL-SCNC: 151 MMOL/L (ref 132–146)
SPECIMEN DESCRIPTION: NORMAL
TIDAL VOLUME: 400
WBC OTHER # BLD: 31 K/UL (ref 4.5–11.5)

## 2024-01-16 PROCEDURE — 2580000003 HC RX 258: Performed by: INTERNAL MEDICINE

## 2024-01-16 PROCEDURE — 80048 BASIC METABOLIC PNL TOTAL CA: CPT

## 2024-01-16 PROCEDURE — 2580000003 HC RX 258

## 2024-01-16 PROCEDURE — 87040 BLOOD CULTURE FOR BACTERIA: CPT

## 2024-01-16 PROCEDURE — 2000000000 HC ICU R&B

## 2024-01-16 PROCEDURE — 84100 ASSAY OF PHOSPHORUS: CPT

## 2024-01-16 PROCEDURE — 85730 THROMBOPLASTIN TIME PARTIAL: CPT

## 2024-01-16 PROCEDURE — 36592 COLLECT BLOOD FROM PICC: CPT

## 2024-01-16 PROCEDURE — 6370000000 HC RX 637 (ALT 250 FOR IP): Performed by: INTERNAL MEDICINE

## 2024-01-16 PROCEDURE — 6360000002 HC RX W HCPCS

## 2024-01-16 PROCEDURE — 82803 BLOOD GASES ANY COMBINATION: CPT

## 2024-01-16 PROCEDURE — 6360000002 HC RX W HCPCS: Performed by: INTERNAL MEDICINE

## 2024-01-16 PROCEDURE — 71250 CT THORAX DX C-: CPT

## 2024-01-16 PROCEDURE — 71045 X-RAY EXAM CHEST 1 VIEW: CPT

## 2024-01-16 PROCEDURE — 83735 ASSAY OF MAGNESIUM: CPT

## 2024-01-16 PROCEDURE — 6370000000 HC RX 637 (ALT 250 FOR IP)

## 2024-01-16 PROCEDURE — A4216 STERILE WATER/SALINE, 10 ML: HCPCS

## 2024-01-16 PROCEDURE — 87070 CULTURE OTHR SPECIMN AEROBIC: CPT

## 2024-01-16 PROCEDURE — 99291 CRITICAL CARE FIRST HOUR: CPT | Performed by: INTERNAL MEDICINE

## 2024-01-16 PROCEDURE — C9113 INJ PANTOPRAZOLE SODIUM, VIA: HCPCS

## 2024-01-16 PROCEDURE — 87385 HISTOPLASMA CAPSUL AG IA: CPT

## 2024-01-16 PROCEDURE — 80053 COMPREHEN METABOLIC PANEL: CPT

## 2024-01-16 PROCEDURE — 82962 GLUCOSE BLOOD TEST: CPT

## 2024-01-16 PROCEDURE — 94640 AIRWAY INHALATION TREATMENT: CPT

## 2024-01-16 PROCEDURE — 6360000002 HC RX W HCPCS: Performed by: SPECIALIST

## 2024-01-16 PROCEDURE — 2580000003 HC RX 258: Performed by: SPECIALIST

## 2024-01-16 PROCEDURE — 85025 COMPLETE CBC W/AUTO DIFF WBC: CPT

## 2024-01-16 PROCEDURE — 36415 COLL VENOUS BLD VENIPUNCTURE: CPT

## 2024-01-16 PROCEDURE — 87205 SMEAR GRAM STAIN: CPT

## 2024-01-16 PROCEDURE — 99232 SBSQ HOSP IP/OBS MODERATE 35: CPT | Performed by: FAMILY MEDICINE

## 2024-01-16 PROCEDURE — 94003 VENT MGMT INPAT SUBQ DAY: CPT

## 2024-01-16 RX ORDER — HEPARIN SODIUM 10000 [USP'U]/100ML
5-30 INJECTION, SOLUTION INTRAVENOUS CONTINUOUS
Status: DISCONTINUED | OUTPATIENT
Start: 2024-01-16 | End: 2024-01-18 | Stop reason: HOSPADM

## 2024-01-16 RX ORDER — DEXTROSE MONOHYDRATE 50 MG/ML
INJECTION, SOLUTION INTRAVENOUS CONTINUOUS
Status: DISCONTINUED | OUTPATIENT
Start: 2024-01-16 | End: 2024-01-17

## 2024-01-16 RX ORDER — HEPARIN SODIUM 1000 [USP'U]/ML
4000 INJECTION, SOLUTION INTRAVENOUS; SUBCUTANEOUS PRN
Status: DISCONTINUED | OUTPATIENT
Start: 2024-01-16 | End: 2024-01-16 | Stop reason: CLARIF

## 2024-01-16 RX ORDER — HEPARIN SODIUM 1000 [USP'U]/ML
40 INJECTION, SOLUTION INTRAVENOUS; SUBCUTANEOUS PRN
Status: DISCONTINUED | OUTPATIENT
Start: 2024-01-16 | End: 2024-01-16

## 2024-01-16 RX ORDER — HEPARIN SODIUM 1000 [USP'U]/ML
80 INJECTION, SOLUTION INTRAVENOUS; SUBCUTANEOUS PRN
Status: DISCONTINUED | OUTPATIENT
Start: 2024-01-16 | End: 2024-01-18 | Stop reason: HOSPADM

## 2024-01-16 RX ORDER — HEPARIN SODIUM 1000 [USP'U]/ML
40 INJECTION, SOLUTION INTRAVENOUS; SUBCUTANEOUS PRN
Status: DISCONTINUED | OUTPATIENT
Start: 2024-01-16 | End: 2024-01-18 | Stop reason: HOSPADM

## 2024-01-16 RX ORDER — HEPARIN SODIUM 1000 [USP'U]/ML
2000 INJECTION, SOLUTION INTRAVENOUS; SUBCUTANEOUS PRN
Status: DISCONTINUED | OUTPATIENT
Start: 2024-01-16 | End: 2024-01-16 | Stop reason: CLARIF

## 2024-01-16 RX ADMIN — DEXTROSE MONOHYDRATE: 50 INJECTION, SOLUTION INTRAVENOUS at 05:55

## 2024-01-16 RX ADMIN — SODIUM CHLORIDE, PRESERVATIVE FREE 40 MG: 5 INJECTION INTRAVENOUS at 09:15

## 2024-01-16 RX ADMIN — MINERAL OIL, PETROLATUM: 425; 568 OINTMENT OPHTHALMIC at 18:27

## 2024-01-16 RX ADMIN — 0.12% CHLORHEXIDINE GLUCONATE 15 ML: 1.2 RINSE ORAL at 09:15

## 2024-01-16 RX ADMIN — VANCOMYCIN HYDROCHLORIDE 1250 MG: 10 INJECTION, POWDER, LYOPHILIZED, FOR SOLUTION INTRAVENOUS at 14:01

## 2024-01-16 RX ADMIN — DEXAMETHASONE SODIUM PHOSPHATE 6 MG: 10 INJECTION INTRAMUSCULAR; INTRAVENOUS at 18:27

## 2024-01-16 RX ADMIN — SODIUM CHLORIDE, PRESERVATIVE FREE 10 ML: 5 INJECTION INTRAVENOUS at 09:15

## 2024-01-16 RX ADMIN — IPRATROPIUM BROMIDE AND ALBUTEROL SULFATE 1 DOSE: .5; 2.5 SOLUTION RESPIRATORY (INHALATION) at 16:13

## 2024-01-16 RX ADMIN — Medication 4 MG/HR: at 23:05

## 2024-01-16 RX ADMIN — IPRATROPIUM BROMIDE AND ALBUTEROL SULFATE 1 DOSE: .5; 2.5 SOLUTION RESPIRATORY (INHALATION) at 08:29

## 2024-01-16 RX ADMIN — IPRATROPIUM BROMIDE AND ALBUTEROL SULFATE 1 DOSE: .5; 2.5 SOLUTION RESPIRATORY (INHALATION) at 04:30

## 2024-01-16 RX ADMIN — MINERAL OIL, PETROLATUM: 425; 568 OINTMENT OPHTHALMIC at 11:43

## 2024-01-16 RX ADMIN — FENTANYL CITRATE 75 MCG/HR: 0.05 INJECTION, SOLUTION INTRAMUSCULAR; INTRAVENOUS at 10:04

## 2024-01-16 RX ADMIN — BUDESONIDE 500 MCG: 0.5 SUSPENSION RESPIRATORY (INHALATION) at 04:30

## 2024-01-16 RX ADMIN — IPRATROPIUM BROMIDE AND ALBUTEROL SULFATE 1 DOSE: .5; 2.5 SOLUTION RESPIRATORY (INHALATION) at 01:26

## 2024-01-16 RX ADMIN — DEXTROSE MONOHYDRATE: 50 INJECTION, SOLUTION INTRAVENOUS at 19:24

## 2024-01-16 RX ADMIN — 0.12% CHLORHEXIDINE GLUCONATE 15 ML: 1.2 RINSE ORAL at 20:13

## 2024-01-16 RX ADMIN — CEFEPIME 1000 MG: 1 INJECTION, POWDER, FOR SOLUTION INTRAMUSCULAR; INTRAVENOUS at 11:43

## 2024-01-16 RX ADMIN — DOCUSATE SODIUM 100 MG: 50 LIQUID ORAL at 20:13

## 2024-01-16 RX ADMIN — HEPARIN SODIUM AND DEXTROSE 15 UNITS/KG/HR: 10000; 5 INJECTION INTRAVENOUS at 06:50

## 2024-01-16 RX ADMIN — MINERAL OIL, PETROLATUM: 425; 568 OINTMENT OPHTHALMIC at 06:07

## 2024-01-16 RX ADMIN — MINERAL OIL, PETROLATUM: 425; 568 OINTMENT OPHTHALMIC at 02:16

## 2024-01-16 RX ADMIN — DOCUSATE SODIUM 100 MG: 50 LIQUID ORAL at 09:15

## 2024-01-16 RX ADMIN — IPRATROPIUM BROMIDE AND ALBUTEROL SULFATE 1 DOSE: .5; 2.5 SOLUTION RESPIRATORY (INHALATION) at 21:56

## 2024-01-16 RX ADMIN — BUDESONIDE 500 MCG: 0.5 SUSPENSION RESPIRATORY (INHALATION) at 16:13

## 2024-01-16 RX ADMIN — HEPARIN SODIUM AND DEXTROSE 15 UNITS/KG/HR: 10000; 5 INJECTION INTRAVENOUS at 19:03

## 2024-01-16 RX ADMIN — IPRATROPIUM BROMIDE AND ALBUTEROL SULFATE 1 DOSE: .5; 2.5 SOLUTION RESPIRATORY (INHALATION) at 12:31

## 2024-01-16 ASSESSMENT — PULMONARY FUNCTION TESTS
PIF_VALUE: 36
PIF_VALUE: 33
PIF_VALUE: 33
PIF_VALUE: 32
PIF_VALUE: 26
PIF_VALUE: 31
PIF_VALUE: 30
PIF_VALUE: 31
PIF_VALUE: 32
PIF_VALUE: 33
PIF_VALUE: 27
PIF_VALUE: 36
PIF_VALUE: 33
PIF_VALUE: 38
PIF_VALUE: 23
PIF_VALUE: 27
PIF_VALUE: 35
PIF_VALUE: 36
PIF_VALUE: 34
PIF_VALUE: 32
PIF_VALUE: 33
PIF_VALUE: 27
PIF_VALUE: 34
PIF_VALUE: 34
PIF_VALUE: 24
PIF_VALUE: 31
PIF_VALUE: 26
PIF_VALUE: 34

## 2024-01-16 ASSESSMENT — PAIN SCALES - GENERAL
PAINLEVEL_OUTOF10: 0

## 2024-01-16 NOTE — CARE COORDINATION
1/16/2024 Positive covid (1/13/24). ICU, Vent/Sedation, Heparin gtt. Maxipime, Decadron, Protonix, Vancomycin. Pt was at [facility] prior to admission. They were preparing for pts discharge. Pts  Venkatesh is LTC at same facility. CM spoke to Venkatesh- primary legal decision maker yesterday. IF needs to return to SNF- will NEED PRECERT, signed INOCENCIA at discharge. Referral to Anders with [facility] to follow. CM following. Palliative care may be beneficial.  Electronically signed by Dayanna Vasquez RN-BC on 1/16/2024 at 1:48 PM

## 2024-01-17 ENCOUNTER — APPOINTMENT (OUTPATIENT)
Dept: GENERAL RADIOLOGY | Age: 79
DRG: 870 | End: 2024-01-17
Payer: MEDICARE

## 2024-01-17 ENCOUNTER — APPOINTMENT (OUTPATIENT)
Age: 79
DRG: 870 | End: 2024-01-17
Attending: INTERNAL MEDICINE
Payer: MEDICARE

## 2024-01-17 PROBLEM — Z51.5 PALLIATIVE CARE ENCOUNTER: Status: ACTIVE | Noted: 2024-01-17

## 2024-01-17 LAB
AADO2: 583.9 MMHG
ALBUMIN SERPL-MCNC: 3 G/DL (ref 3.5–5.2)
ALP SERPL-CCNC: 206 U/L (ref 35–104)
ALT SERPL-CCNC: 112 U/L (ref 0–32)
ANION GAP SERPL CALCULATED.3IONS-SCNC: 6 MMOL/L (ref 7–16)
ANION GAP SERPL CALCULATED.3IONS-SCNC: 8 MMOL/L (ref 7–16)
AST SERPL-CCNC: 95 U/L (ref 0–31)
B.E.: -2.3 MMOL/L (ref -3–3)
BASOPHILS # BLD: 0 K/UL (ref 0–0.2)
BASOPHILS NFR BLD: 0 % (ref 0–2)
BILIRUB SERPL-MCNC: 0.3 MG/DL (ref 0–1.2)
BUN SERPL-MCNC: 24 MG/DL (ref 6–23)
BUN SERPL-MCNC: 24 MG/DL (ref 6–23)
BUN SERPL-MCNC: 25 MG/DL (ref 6–23)
BUN SERPL-MCNC: 26 MG/DL (ref 6–23)
CALCIUM SERPL-MCNC: 8 MG/DL (ref 8.6–10.2)
CALCIUM SERPL-MCNC: 8.2 MG/DL (ref 8.6–10.2)
CALCIUM SERPL-MCNC: 8.2 MG/DL (ref 8.6–10.2)
CALCIUM SERPL-MCNC: 8.3 MG/DL (ref 8.6–10.2)
CHLORIDE SERPL-SCNC: 108 MMOL/L (ref 98–107)
CHLORIDE SERPL-SCNC: 110 MMOL/L (ref 98–107)
CHLORIDE SERPL-SCNC: 110 MMOL/L (ref 98–107)
CHLORIDE SERPL-SCNC: 113 MMOL/L (ref 98–107)
CO2 SERPL-SCNC: 24 MMOL/L (ref 22–29)
CO2 SERPL-SCNC: 25 MMOL/L (ref 22–29)
CO2 SERPL-SCNC: 25 MMOL/L (ref 22–29)
CO2 SERPL-SCNC: 26 MMOL/L (ref 22–29)
COHB: 0.6 % (ref 0–1.5)
CREAT SERPL-MCNC: 0.6 MG/DL (ref 0.5–1)
CRITICAL: ABNORMAL
DATE ANALYZED: ABNORMAL
DATE LAST DOSE: NORMAL
DATE OF COLLECTION: ABNORMAL
ECHO AV MEAN GRADIENT: 6 MMHG
ECHO AV MEAN VELOCITY: 1.2 M/S
ECHO AV PEAK GRADIENT: 9 MMHG
ECHO AV PEAK VELOCITY: 1.5 M/S
ECHO AV VELOCITY RATIO: 0.8
ECHO AV VTI: 25.8 CM
ECHO BSA: 1.47 M2
ECHO EST RA PRESSURE: 3 MMHG
ECHO LA DIAMETER INDEX: 2.01 CM/M2
ECHO LA DIAMETER: 2.9 CM
ECHO LV FRACTIONAL SHORTENING: 42 % (ref 28–44)
ECHO LV INTERNAL DIMENSION DIASTOLE INDEX: 2.64 CM/M2
ECHO LV INTERNAL DIMENSION DIASTOLIC: 3.8 CM (ref 3.9–5.3)
ECHO LV INTERNAL DIMENSION SYSTOLIC INDEX: 1.53 CM/M2
ECHO LV INTERNAL DIMENSION SYSTOLIC: 2.2 CM
ECHO LV ISOVOLUMETRIC RELAXATION TIME (IVRT): 95.2 MS
ECHO LV IVSD: 1 CM (ref 0.6–0.9)
ECHO LV MASS 2D: 117.3 G (ref 67–162)
ECHO LV MASS INDEX 2D: 81.4 G/M2 (ref 43–95)
ECHO LV POSTERIOR WALL DIASTOLIC: 1 CM (ref 0.6–0.9)
ECHO LV RELATIVE WALL THICKNESS RATIO: 0.53
ECHO LVOT AV VTI INDEX: 0.89
ECHO LVOT MEAN GRADIENT: 3 MMHG
ECHO LVOT PEAK GRADIENT: 6 MMHG
ECHO LVOT PEAK VELOCITY: 1.2 M/S
ECHO LVOT VTI: 23 CM
ECHO MV "A" WAVE DURATION: 160.9 MSEC
ECHO MV A VELOCITY: 1.06 M/S
ECHO MV AREA PHT: 4.2 CM2
ECHO MV E DECELERATION TIME (DT): 227.8 MS
ECHO MV E VELOCITY: 0.68 M/S
ECHO MV E/A RATIO: 0.64
ECHO MV LVOT VTI INDEX: 0.83
ECHO MV MAX VELOCITY: 1.2 M/S
ECHO MV MEAN GRADIENT: 2 MMHG
ECHO MV MEAN VELOCITY: 0.7 M/S
ECHO MV PEAK GRADIENT: 6 MMHG
ECHO MV PRESSURE HALF TIME (PHT): 52.8 MS
ECHO MV VTI: 19.2 CM
ECHO RIGHT VENTRICULAR SYSTOLIC PRESSURE (RVSP): 46 MMHG
ECHO RV INTERNAL DIMENSION: 2.3 CM
ECHO TV REGURGITANT MAX VELOCITY: 3.27 M/S
ECHO TV REGURGITANT PEAK GRADIENT: 43 MMHG
EOSINOPHIL # BLD: 0 K/UL (ref 0.05–0.5)
EOSINOPHILS RELATIVE PERCENT: 0 % (ref 0–6)
ERYTHROCYTE [DISTWIDTH] IN BLOOD BY AUTOMATED COUNT: 15.3 % (ref 11.5–15)
FIO2: 100 %
GFR SERPL CREATININE-BSD FRML MDRD: >60 ML/MIN/1.73M2
GLUCOSE BLD-MCNC: 116 MG/DL (ref 74–99)
GLUCOSE BLD-MCNC: 148 MG/DL (ref 74–99)
GLUCOSE BLD-MCNC: 158 MG/DL (ref 74–99)
GLUCOSE BLD-MCNC: 170 MG/DL (ref 74–99)
GLUCOSE BLD-MCNC: 181 MG/DL (ref 74–99)
GLUCOSE SERPL-MCNC: 145 MG/DL (ref 74–99)
GLUCOSE SERPL-MCNC: 158 MG/DL (ref 74–99)
GLUCOSE SERPL-MCNC: 183 MG/DL (ref 74–99)
GLUCOSE SERPL-MCNC: 187 MG/DL (ref 74–99)
HCO3: 22.4 MMOL/L (ref 22–26)
HCT VFR BLD AUTO: 30.7 % (ref 34–48)
HGB BLD-MCNC: 9.8 G/DL (ref 11.5–15.5)
HHB: 6.9 % (ref 0–5)
LAB: ABNORMAL
LYMPHOCYTES NFR BLD: 0.87 K/UL (ref 1.5–4)
LYMPHOCYTES RELATIVE PERCENT: 3 % (ref 20–42)
Lab: 440
MAGNESIUM SERPL-MCNC: 2.4 MG/DL (ref 1.6–2.6)
MCH RBC QN AUTO: 32.8 PG (ref 26–35)
MCHC RBC AUTO-ENTMCNC: 31.9 G/DL (ref 32–34.5)
MCV RBC AUTO: 102.7 FL (ref 80–99.9)
METHB: 0.3 % (ref 0–1.5)
MODE: AC
MONOCYTES NFR BLD: 1.74 K/UL (ref 0.1–0.95)
MONOCYTES NFR BLD: 5 % (ref 2–12)
NEUTROPHILS NFR BLD: 92 % (ref 43–80)
NEUTS SEG NFR BLD: 30.39 K/UL (ref 1.8–7.3)
NUCLEATED RED BLOOD CELLS: 3 PER 100 WBC
O2 CONTENT: 14 ML/DL
O2 SATURATION: 93 % (ref 92–98.5)
O2HB: 92.2 % (ref 94–97)
OPERATOR ID: 5523
PARTIAL THROMBOPLASTIN TIME: 70.3 SEC (ref 24.5–35.1)
PATIENT TEMP: 37 C
PCO2: 38.2 MMHG (ref 35–45)
PEEP/CPAP: 12 CMH2O
PFO2: 0.66 MMHG/%
PH BLOOD GAS: 7.39 (ref 7.35–7.45)
PHOSPHATE SERPL-MCNC: 1.5 MG/DL (ref 2.5–4.5)
PHOSPHATE SERPL-MCNC: 3.4 MG/DL (ref 2.5–4.5)
PLATELET, FLUORESCENCE: 256 K/UL (ref 130–450)
PMV BLD AUTO: 12.2 FL (ref 7–12)
PO2: 65.9 MMHG (ref 75–100)
POTASSIUM SERPL-SCNC: 4.1 MMOL/L (ref 3.5–5)
POTASSIUM SERPL-SCNC: 4.2 MMOL/L (ref 3.5–5)
POTASSIUM SERPL-SCNC: 4.4 MMOL/L (ref 3.5–5)
POTASSIUM SERPL-SCNC: 4.5 MMOL/L (ref 3.5–5)
PROT SERPL-MCNC: 5.3 G/DL (ref 6.4–8.3)
RBC # BLD AUTO: 2.99 M/UL (ref 3.5–5.5)
RBC # BLD: ABNORMAL 10*6/UL
RI(T): 8.86
RR MECHANICAL: 28 B/MIN
SODIUM SERPL-SCNC: 141 MMOL/L (ref 132–146)
SODIUM SERPL-SCNC: 142 MMOL/L (ref 132–146)
SODIUM SERPL-SCNC: 143 MMOL/L (ref 132–146)
SODIUM SERPL-SCNC: 145 MMOL/L (ref 132–146)
SOURCE, BLOOD GAS: ABNORMAL
THB: 10.8 G/DL (ref 11.5–16.5)
TIME ANALYZED: 442
TME LAST DOSE: NORMAL H
VANCOMYCIN DOSE: NORMAL MG
VANCOMYCIN SERPL-MCNC: 9.8 UG/ML (ref 5–40)
VT MECHANICAL: 400 ML
WBC OTHER # BLD: 33 K/UL (ref 4.5–11.5)

## 2024-01-17 PROCEDURE — 71045 X-RAY EXAM CHEST 1 VIEW: CPT

## 2024-01-17 PROCEDURE — 80202 ASSAY OF VANCOMYCIN: CPT

## 2024-01-17 PROCEDURE — 93308 TTE F-UP OR LMTD: CPT

## 2024-01-17 PROCEDURE — 82962 GLUCOSE BLOOD TEST: CPT

## 2024-01-17 PROCEDURE — 2580000003 HC RX 258

## 2024-01-17 PROCEDURE — 99232 SBSQ HOSP IP/OBS MODERATE 35: CPT | Performed by: INTERNAL MEDICINE

## 2024-01-17 PROCEDURE — 99291 CRITICAL CARE FIRST HOUR: CPT | Performed by: INTERNAL MEDICINE

## 2024-01-17 PROCEDURE — 6370000000 HC RX 637 (ALT 250 FOR IP): Performed by: INTERNAL MEDICINE

## 2024-01-17 PROCEDURE — 87449 NOS EACH ORGANISM AG IA: CPT

## 2024-01-17 PROCEDURE — 6360000002 HC RX W HCPCS: Performed by: INTERNAL MEDICINE

## 2024-01-17 PROCEDURE — A4216 STERILE WATER/SALINE, 10 ML: HCPCS

## 2024-01-17 PROCEDURE — 6370000000 HC RX 637 (ALT 250 FOR IP)

## 2024-01-17 PROCEDURE — 94003 VENT MGMT INPAT SUBQ DAY: CPT

## 2024-01-17 PROCEDURE — 94640 AIRWAY INHALATION TREATMENT: CPT

## 2024-01-17 PROCEDURE — 83735 ASSAY OF MAGNESIUM: CPT

## 2024-01-17 PROCEDURE — 84100 ASSAY OF PHOSPHORUS: CPT

## 2024-01-17 PROCEDURE — C9113 INJ PANTOPRAZOLE SODIUM, VIA: HCPCS

## 2024-01-17 PROCEDURE — 2580000003 HC RX 258: Performed by: INTERNAL MEDICINE

## 2024-01-17 PROCEDURE — 82805 BLOOD GASES W/O2 SATURATION: CPT

## 2024-01-17 PROCEDURE — 85025 COMPLETE CBC W/AUTO DIFF WBC: CPT

## 2024-01-17 PROCEDURE — 99222 1ST HOSP IP/OBS MODERATE 55: CPT | Performed by: NURSE PRACTITIONER

## 2024-01-17 PROCEDURE — 80053 COMPREHEN METABOLIC PANEL: CPT

## 2024-01-17 PROCEDURE — 85730 THROMBOPLASTIN TIME PARTIAL: CPT

## 2024-01-17 PROCEDURE — 36592 COLLECT BLOOD FROM PICC: CPT

## 2024-01-17 PROCEDURE — 6360000002 HC RX W HCPCS

## 2024-01-17 PROCEDURE — 80048 BASIC METABOLIC PNL TOTAL CA: CPT

## 2024-01-17 PROCEDURE — 2000000000 HC ICU R&B

## 2024-01-17 PROCEDURE — 2500000003 HC RX 250 WO HCPCS

## 2024-01-17 PROCEDURE — 2580000003 HC RX 258: Performed by: SPECIALIST

## 2024-01-17 PROCEDURE — 6360000002 HC RX W HCPCS: Performed by: CLINICAL NURSE SPECIALIST

## 2024-01-17 PROCEDURE — 6360000002 HC RX W HCPCS: Performed by: SPECIALIST

## 2024-01-17 PROCEDURE — 2700000000 HC OXYGEN THERAPY PER DAY

## 2024-01-17 RX ORDER — FLUCONAZOLE 2 MG/ML
400 INJECTION, SOLUTION INTRAVENOUS EVERY 24 HOURS
Status: DISCONTINUED | OUTPATIENT
Start: 2024-01-17 | End: 2024-01-18 | Stop reason: HOSPADM

## 2024-01-17 RX ADMIN — METHYLPREDNISOLONE SODIUM SUCCINATE 125 MG: 125 INJECTION, POWDER, LYOPHILIZED, FOR SOLUTION INTRAMUSCULAR; INTRAVENOUS at 18:46

## 2024-01-17 RX ADMIN — IPRATROPIUM BROMIDE AND ALBUTEROL SULFATE 1 DOSE: .5; 2.5 SOLUTION RESPIRATORY (INHALATION) at 17:11

## 2024-01-17 RX ADMIN — IPRATROPIUM BROMIDE AND ALBUTEROL SULFATE 1 DOSE: .5; 2.5 SOLUTION RESPIRATORY (INHALATION) at 22:09

## 2024-01-17 RX ADMIN — CEFEPIME 1000 MG: 1 INJECTION, POWDER, FOR SOLUTION INTRAMUSCULAR; INTRAVENOUS at 12:04

## 2024-01-17 RX ADMIN — BUDESONIDE 500 MCG: 0.5 SUSPENSION RESPIRATORY (INHALATION) at 04:53

## 2024-01-17 RX ADMIN — CEFEPIME 1000 MG: 1 INJECTION, POWDER, FOR SOLUTION INTRAMUSCULAR; INTRAVENOUS at 00:33

## 2024-01-17 RX ADMIN — MINERAL OIL, PETROLATUM: 425; 568 OINTMENT OPHTHALMIC at 00:33

## 2024-01-17 RX ADMIN — MINERAL OIL, PETROLATUM: 425; 568 OINTMENT OPHTHALMIC at 12:07

## 2024-01-17 RX ADMIN — 0.12% CHLORHEXIDINE GLUCONATE 15 ML: 1.2 RINSE ORAL at 20:04

## 2024-01-17 RX ADMIN — POLYETHYLENE GLYCOL 3350 17 G: 17 POWDER, FOR SOLUTION ORAL at 12:06

## 2024-01-17 RX ADMIN — IPRATROPIUM BROMIDE AND ALBUTEROL SULFATE 1 DOSE: .5; 2.5 SOLUTION RESPIRATORY (INHALATION) at 01:19

## 2024-01-17 RX ADMIN — DOCUSATE SODIUM 100 MG: 50 LIQUID ORAL at 20:04

## 2024-01-17 RX ADMIN — IPRATROPIUM BROMIDE AND ALBUTEROL SULFATE 1 DOSE: .5; 2.5 SOLUTION RESPIRATORY (INHALATION) at 13:36

## 2024-01-17 RX ADMIN — MINERAL OIL, PETROLATUM: 425; 568 OINTMENT OPHTHALMIC at 06:26

## 2024-01-17 RX ADMIN — SODIUM CHLORIDE, PRESERVATIVE FREE 10 ML: 5 INJECTION INTRAVENOUS at 20:04

## 2024-01-17 RX ADMIN — METHYLPREDNISOLONE SODIUM SUCCINATE 125 MG: 125 INJECTION, POWDER, LYOPHILIZED, FOR SOLUTION INTRAMUSCULAR; INTRAVENOUS at 12:06

## 2024-01-17 RX ADMIN — MINERAL OIL, PETROLATUM: 425; 568 OINTMENT OPHTHALMIC at 15:10

## 2024-01-17 RX ADMIN — FENTANYL CITRATE 150 MCG/HR: 0.05 INJECTION, SOLUTION INTRAMUSCULAR; INTRAVENOUS at 17:58

## 2024-01-17 RX ADMIN — 0.12% CHLORHEXIDINE GLUCONATE 15 ML: 1.2 RINSE ORAL at 08:57

## 2024-01-17 RX ADMIN — SODIUM CHLORIDE, PRESERVATIVE FREE 40 MG: 5 INJECTION INTRAVENOUS at 08:57

## 2024-01-17 RX ADMIN — Medication 8 MG/HR: at 14:34

## 2024-01-17 RX ADMIN — FLUCONAZOLE IN SODIUM CHLORIDE 400 MG: 2 INJECTION, SOLUTION INTRAVENOUS at 15:10

## 2024-01-17 RX ADMIN — SODIUM PHOSPHATE, MONOBASIC, MONOHYDRATE AND SODIUM PHOSPHATE, DIBASIC, ANHYDROUS 30 MMOL: 276; 142 INJECTION, SOLUTION INTRAVENOUS at 08:24

## 2024-01-17 RX ADMIN — BUDESONIDE 500 MCG: 0.5 SUSPENSION RESPIRATORY (INHALATION) at 17:11

## 2024-01-17 RX ADMIN — VANCOMYCIN HYDROCHLORIDE 1000 MG: 1 INJECTION, POWDER, LYOPHILIZED, FOR SOLUTION INTRAVENOUS at 12:06

## 2024-01-17 RX ADMIN — MINERAL OIL, PETROLATUM: 425; 568 OINTMENT OPHTHALMIC at 18:47

## 2024-01-17 RX ADMIN — FENTANYL CITRATE 100 MCG/HR: 0.05 INJECTION, SOLUTION INTRAMUSCULAR; INTRAVENOUS at 01:08

## 2024-01-17 RX ADMIN — DEXTROSE MONOHYDRATE: 50 INJECTION, SOLUTION INTRAVENOUS at 08:46

## 2024-01-17 RX ADMIN — FENTANYL CITRATE 150 MCG/HR: 0.05 INJECTION, SOLUTION INTRAMUSCULAR; INTRAVENOUS at 10:31

## 2024-01-17 RX ADMIN — SODIUM CHLORIDE, PRESERVATIVE FREE 10 ML: 5 INJECTION INTRAVENOUS at 08:25

## 2024-01-17 RX ADMIN — IPRATROPIUM BROMIDE AND ALBUTEROL SULFATE 1 DOSE: .5; 2.5 SOLUTION RESPIRATORY (INHALATION) at 09:31

## 2024-01-17 RX ADMIN — IPRATROPIUM BROMIDE AND ALBUTEROL SULFATE 1 DOSE: .5; 2.5 SOLUTION RESPIRATORY (INHALATION) at 04:53

## 2024-01-17 RX ADMIN — DOCUSATE SODIUM 100 MG: 50 LIQUID ORAL at 08:57

## 2024-01-17 ASSESSMENT — PULMONARY FUNCTION TESTS
PIF_VALUE: 36
PIF_VALUE: 10
PIF_VALUE: 22
PIF_VALUE: 25
PIF_VALUE: 23
PIF_VALUE: 37
PIF_VALUE: 32
PIF_VALUE: 20
PIF_VALUE: 34
PIF_VALUE: 35
PIF_VALUE: 21
PIF_VALUE: 22
PIF_VALUE: 33
PIF_VALUE: 38
PIF_VALUE: 52
PIF_VALUE: 34
PIF_VALUE: 35
PIF_VALUE: 35
PIF_VALUE: 33
PIF_VALUE: 14
PIF_VALUE: 15
PIF_VALUE: 32
PIF_VALUE: 21
PIF_VALUE: 33
PIF_VALUE: 30
PIF_VALUE: 32
PIF_VALUE: 22
PIF_VALUE: 34
PIF_VALUE: 35
PIF_VALUE: 24
PIF_VALUE: 35
PIF_VALUE: 35
PIF_VALUE: 23
PIF_VALUE: 21
PIF_VALUE: 33

## 2024-01-17 ASSESSMENT — PAIN SCALES - GENERAL: PAINLEVEL_OUTOF10: 2

## 2024-01-17 NOTE — CARE COORDINATION
1/17/2024 ICU, Vent, Sedation, Heparin gtt- sedation vacation- pt double staking breaths. Pt was at [facility] prior to admission, they were preparing for pts discharge. Pts  Venkatesh is LTC at same facility. Palliative care poke to Venkatesh- primary legal decision maker, today. Pt has been DNR-CCA. IF needs to return to SNF- will NEED PRECERT, signed INOCENCIA at discharge. Referral to Anders with [facility] to follow. CM for discharge planning. Electronically signed by Dayanna Vasquez RN-BC on 1/17/2024 at 12:10 PM

## 2024-01-17 NOTE — CONSULTS
MICU Consult       Harsh Michaels  :  1945(79 y.o.)  MRN:  [id number]    Date: 2024   Attending: Dr. Buckley    Subjective:      Chief Complaint: Confusion    HPI: Patient admitted through the emergency room yesterday.  She initially had presented due to confusion.  She was found to be hyponatremic, have KENDALL, and have COVID-19 pneumonia.  When she presented to the emergency room she was noted to be quite hypoxemic.  She was placed on a BiPAP overnight.  She was transferred to the ICU and emergently intubated this morning.  History is taken from the medical record as patient is not able to provide it    Past Medical History:   Diagnosis Date    Bronchitis     Depression     GERD (gastroesophageal reflux disease)     Hyperlipidemia     Hypertension     Irritable bowel     Kidney stone     Migraines     Thyroid disease        Past Surgical History:   Procedure Laterality Date    APPENDECTOMY      BACK SURGERY      cervical disc surgery    HYSTERECTOMY (CERVIX STATUS UNKNOWN)      NERVE BLOCK Left 2016    sacroiliac joint #1    NERVE BLOCK Left 2016    si inj #2    NOSE SURGERY      bone removed d/t breathing    OTHER SURGICAL HISTORY Left 2016    sacroiliac joint radiofrequency    OTHER SURGICAL HISTORY Left 2017    radiofrequency, sacroiliac    SPINE SURGERY      SPLENECTOMY  age 25    d/t a tear etiology unknown    TONSILLECTOMY          Family History   Problem Relation Age of Onset    Arthritis Other     Heart Disease Other     High Blood Pressure Other        Social History     Socioeconomic History    Marital status:      Spouse name: Not on file    Number of children: Not on file    Years of education: Not on file    Highest education level: Not on file   Occupational History    Not on file   Tobacco Use    Smoking status: Every Day     Current packs/day: 0.50     Average packs/day: 0.5 packs/day for 50.0 years (25.0 ttl pk-yrs)     Types: Cigarettes    
Comprehensive Nutrition Assessment    Type and Reason for Visit:  Initial, Consult    Nutrition Recommendations/Plan:   Continue NPO status   Provide tube feeding recommendations/orders per MD Consult.    Recommend Vital AF 1.2 (peptide based) @ 40ml/h and 270ml flush q4h (fluids per MD). Regimen to provide 1152kcal, 72g pro, 2399ml water per day which meets 100% of estimated protein & energy needs at goal rate.        Malnutrition Assessment:  Malnutrition Status:  Insufficient data (01/15/24 1043)    Context:  Acute Illness     Findings of the 6 clinical characteristics of malnutrition:  Energy Intake:  Unable to assess (intubated)  Weight Loss:  Greater than 5% over 1 month     Body Fat Loss:  Unable to assess (COVID+ iso)     Muscle Mass Loss:  Unable to assess (COVID+ iso)    Fluid Accumulation:  No significant fluid accumulation     Strength:  Not Performed    Nutrition Assessment:    Pt admit w/ acute respiratory failure w/ HCAP/COVID+ PNA, also noted hypernatremia, KENDALL, dehydration. PMHx of COPD, HTN/HLD, hypothyroidism. Pt intubated and started on NG TF, consult noted for TFOM, will provide updated recs/orders & continue to monitor while inpatient.    Nutrition Related Findings:    abd soft, NT, flat, missing teeth, active BSx4, no edema noted, I/O's WDL, intubated/sedated, COVID+ iso, MAP 75 --> 54, Na 156, Chl 121, Mg 2.9, Bglu 139-249, WBC 23.1, BUN 69, Cr 1.4, GFR 38 Wound Type: None       Current Nutrition Intake & Therapies:    Average Meal Intake: NPO  Average Supplements Intake: NPO  Diet NPO  ADULT TUBE FEEDING; Nasogastric; Peptide Based; Continuous; 10; Yes; 10; Q 4 hours; 40; 270; Q 4 hours    Anthropometric Measures:  Height: 144.8 cm (4' 9.01\")  Ideal Body Weight (IBW): 85 lbs (39 kg)    Admission Body Weight: 52.1 kg (114 lb 14 oz) (1/13 bed scale)  Current Body Weight: 43.1 kg (95 lb 0.3 oz) (1/14 bed scale), 111.8 % IBW. Weight Source: Bed Scale  Current BMI (kg/m2): 20.6  Usual Body 
Guarded    Physical Exam:  BP (!) 74/57   Pulse 98   Temp 99.3 °F (37.4 °C) (Bladder)   Resp (!) 41   Ht 1.448 m (4' 9.01\")   Wt 54.3 kg (119 lb 9.6 oz)   SpO2 91%   BMI 25.87 kg/m²   Constitutional:  intubated, sedated   Eyes: no scleral icterus, normal lids, no discharge  ENMT:  Normocephalic, atraumatic, mucosa moist, EOMI  Neck:  trachea midline, no JVD  Lungs:  diminished   Heart::  RRR  Abd:  Soft, non tender, non distended, bowel sounds present  Ext:  no edema, pulses present  Skin:  Warm and dry  Psych: non-anxious affect  Neuro:  intubated, sedated    Objective data reviewed: labs, images, records, medication use, vitals, and chart    Discussed patient and the plan of care with the other IDT members: Floor Nurse, Patient, and Family    Time/Communication  Greater than 50% of time spent, total 55 minutes in counseling and coordination of care at the bedside regarding goals of care and diagnosis and prognosis.    Thank you for allowing Palliative Medicine to participate in the care of Harsh Michaels.        
Insecurity Present (6/29/2021)    Hunger Vital Sign     Worried About Running Out of Food in the Last Year: Often true     Ran Out of Food in the Last Year: Sometimes true       Family History     Family History   Problem Relation Age of Onset    Arthritis Other     Heart Disease Other     High Blood Pressure Other        Review of Systems   Review of Systems   Unable   Physical Exam   /68   Pulse 89   Temp 97.2 °F (36.2 °C) (Oral)   Resp 26   Wt 52.2 kg (115 lb)   SpO2 91%   BMI 24.88 kg/m²   Body Habitus: thin   General Appearance: arouses  cooperative, looks stated age,   acute distress  Eyes: PERRLA, EOMI  HENT: mucous membranes moist, normocephalic atraumatic  Neck: supple, no masses  Cardiovascular: normal heart sounds, regular rate, regular rhythm  Respiratory: dec  breath sounds,  respiratory distress,  accessory muscle use  bipap  Abdominal: normal bowel sounds, soft, non-tender,   distended  Musculoskeletal: no skeletal abnormalities, normal ROM  Skin: warm, dry, normal turgor, normal color no rash   Neurological: arouses       Labs       Recent Labs     01/13/24  1235   WBC 20.4*   RBC 5.09   HGB 16.7*   HCT 50.4*   MCV 99.0   RDW 14.3           Recent Labs     01/13/24  1235   *   K 3.6   *   CO2 24   *   CREATININE 2.8*   GLUCOSE 149*       Recent Labs     01/13/24  1235   PROCAL 1.01*   AST 76*   *     Lab Results   Component Value Date/Time    CHOL 174 05/07/2022 04:14 AM    TRIG 133 05/07/2022 04:14 AM    HDL 58 05/07/2022 04:14 AM    LDLCALC 89 05/07/2022 04:14 AM    LABVLDL 27 05/07/2022 04:14 AM     Lab Results   Component Value Date/Time    VITD25 17 (L) 02/27/2012 10:15 AM        Recent Labs     01/13/24  1235   AST 76*   *   BILITOT 0.7   ALKPHOS 261*     Results       Procedure Component Value Units Date/Time    Respiratory Panel, Molecular, with COVID-19 (Restricted: peds pts or suitable admitted adults) [[id number]]     Order Status:

## 2024-01-18 ENCOUNTER — APPOINTMENT (OUTPATIENT)
Dept: GENERAL RADIOLOGY | Age: 79
DRG: 870 | End: 2024-01-18
Payer: MEDICARE

## 2024-01-18 VITALS
HEIGHT: 57 IN | SYSTOLIC BLOOD PRESSURE: 103 MMHG | HEART RATE: 110 BPM | WEIGHT: 118.7 LBS | DIASTOLIC BLOOD PRESSURE: 71 MMHG | BODY MASS INDEX: 25.61 KG/M2 | RESPIRATION RATE: 38 BRPM | OXYGEN SATURATION: 45 % | TEMPERATURE: 99.1 F

## 2024-01-18 LAB
AADO2: 588.8 MMHG
ANION GAP SERPL CALCULATED.3IONS-SCNC: 7 MMOL/L (ref 7–16)
B.E.: -0.4 MMOL/L (ref -3–3)
BASOPHILS # BLD: 0 K/UL (ref 0–0.2)
BASOPHILS NFR BLD: 0 % (ref 0–2)
BUN SERPL-MCNC: 26 MG/DL (ref 6–23)
CALCIUM SERPL-MCNC: 8.4 MG/DL (ref 8.6–10.2)
CHLORIDE SERPL-SCNC: 107 MMOL/L (ref 98–107)
CO2 SERPL-SCNC: 25 MMOL/L (ref 22–29)
COHB: 0.8 % (ref 0–1.5)
CREAT SERPL-MCNC: 0.6 MG/DL (ref 0.5–1)
CRITICAL: ABNORMAL
DATE ANALYZED: ABNORMAL
DATE LAST DOSE: NORMAL
DATE OF COLLECTION: ABNORMAL
EOSINOPHIL # BLD: 0 K/UL (ref 0.05–0.5)
EOSINOPHILS RELATIVE PERCENT: 0 % (ref 0–6)
ERYTHROCYTE [DISTWIDTH] IN BLOOD BY AUTOMATED COUNT: 15.6 % (ref 11.5–15)
FIO2: 100 %
GFR SERPL CREATININE-BSD FRML MDRD: >60 ML/MIN/1.73M2
GLUCOSE BLD-MCNC: 167 MG/DL (ref 74–99)
GLUCOSE BLD-MCNC: 186 MG/DL (ref 74–99)
GLUCOSE SERPL-MCNC: 159 MG/DL (ref 74–99)
HCO3: 26.1 MMOL/L (ref 22–26)
HCT VFR BLD AUTO: 30.8 % (ref 34–48)
HGB BLD-MCNC: 9.7 G/DL (ref 11.5–15.5)
HHB: 17.1 % (ref 0–5)
LAB: ABNORMAL
LYMPHOCYTES NFR BLD: 0.3 K/UL (ref 1.5–4)
LYMPHOCYTES RELATIVE PERCENT: 1 % (ref 20–42)
Lab: 815
MAGNESIUM SERPL-MCNC: 2.5 MG/DL (ref 1.6–2.6)
MCH RBC QN AUTO: 32 PG (ref 26–35)
MCHC RBC AUTO-ENTMCNC: 31.5 G/DL (ref 32–34.5)
MCV RBC AUTO: 101.7 FL (ref 80–99.9)
METAMYELOCYTES ABSOLUTE COUNT: 0.61 K/UL (ref 0–0.12)
METAMYELOCYTES: 2 % (ref 0–1)
METHB: 0.3 % (ref 0–1.5)
MICROORGANISM SPEC CULT: ABNORMAL
MICROORGANISM SPEC CULT: NORMAL
MICROORGANISM SPEC CULT: NORMAL
MICROORGANISM/AGENT SPEC: ABNORMAL
MODE: AC
MONOCYTES NFR BLD: 0.3 K/UL (ref 0.1–0.95)
MONOCYTES NFR BLD: 1 % (ref 2–12)
MYCOPLASMA AB,IGM: NORMAL
NEUTROPHILS NFR BLD: 96 % (ref 43–80)
NEUTS SEG NFR BLD: 32.88 K/UL (ref 1.8–7.3)
NUCLEATED RED BLOOD CELLS: 2 PER 100 WBC
O2 CONTENT: 12.2 ML/DL
O2 SATURATION: 82.7 % (ref 92–98.5)
O2HB: 81.8 % (ref 94–97)
OPERATOR ID: 797
PARTIAL THROMBOPLASTIN TIME: 63.3 SEC (ref 24.5–35.1)
PATIENT TEMP: 37 C
PCO2: 51.6 MMHG (ref 35–45)
PEEP/CPAP: 12 CMH2O
PFO2: 0.48 MMHG/%
PH BLOOD GAS: 7.32 (ref 7.35–7.45)
PHOSPHATE SERPL-MCNC: 2.9 MG/DL (ref 2.5–4.5)
PLATELET # BLD AUTO: 244 K/UL (ref 130–450)
PMV BLD AUTO: 11.8 FL (ref 7–12)
PO2: 47.6 MMHG (ref 75–100)
POTASSIUM SERPL-SCNC: 4.5 MMOL/L (ref 3.5–5)
RBC # BLD AUTO: 3.03 M/UL (ref 3.5–5.5)
RBC # BLD: ABNORMAL 10*6/UL
RI(T): 12.37
RR MECHANICAL: 28 B/MIN
SERVICE CMNT-IMP: NORMAL
SERVICE CMNT-IMP: NORMAL
SODIUM SERPL-SCNC: 139 MMOL/L (ref 132–146)
SOURCE, BLOOD GAS: ABNORMAL
SPECIMEN DESCRIPTION: ABNORMAL
SPECIMEN DESCRIPTION: ABNORMAL
SPECIMEN DESCRIPTION: NORMAL
SPECIMEN DESCRIPTION: NORMAL
THB: 10.6 G/DL (ref 11.5–16.5)
TIME ANALYZED: 828
TME LAST DOSE: NORMAL H
VANCOMYCIN DOSE: NORMAL MG
VANCOMYCIN SERPL-MCNC: 9.9 UG/ML (ref 5–40)
VT MECHANICAL: 400 ML
WBC OTHER # BLD: 34.1 K/UL (ref 4.5–11.5)

## 2024-01-18 PROCEDURE — 2580000003 HC RX 258: Performed by: INTERNAL MEDICINE

## 2024-01-18 PROCEDURE — 6360000002 HC RX W HCPCS: Performed by: INTERNAL MEDICINE

## 2024-01-18 PROCEDURE — 99232 SBSQ HOSP IP/OBS MODERATE 35: CPT | Performed by: INTERNAL MEDICINE

## 2024-01-18 PROCEDURE — 2580000003 HC RX 258

## 2024-01-18 PROCEDURE — 6370000000 HC RX 637 (ALT 250 FOR IP)

## 2024-01-18 PROCEDURE — 85730 THROMBOPLASTIN TIME PARTIAL: CPT

## 2024-01-18 PROCEDURE — 99291 CRITICAL CARE FIRST HOUR: CPT | Performed by: INTERNAL MEDICINE

## 2024-01-18 PROCEDURE — 85025 COMPLETE CBC W/AUTO DIFF WBC: CPT

## 2024-01-18 PROCEDURE — A4216 STERILE WATER/SALINE, 10 ML: HCPCS

## 2024-01-18 PROCEDURE — 94003 VENT MGMT INPAT SUBQ DAY: CPT

## 2024-01-18 PROCEDURE — 94640 AIRWAY INHALATION TREATMENT: CPT

## 2024-01-18 PROCEDURE — 6360000002 HC RX W HCPCS

## 2024-01-18 PROCEDURE — 71045 X-RAY EXAM CHEST 1 VIEW: CPT

## 2024-01-18 PROCEDURE — 6360000002 HC RX W HCPCS: Performed by: SPECIALIST

## 2024-01-18 PROCEDURE — 36592 COLLECT BLOOD FROM PICC: CPT

## 2024-01-18 PROCEDURE — 82805 BLOOD GASES W/O2 SATURATION: CPT

## 2024-01-18 PROCEDURE — 80202 ASSAY OF VANCOMYCIN: CPT

## 2024-01-18 PROCEDURE — 84100 ASSAY OF PHOSPHORUS: CPT

## 2024-01-18 PROCEDURE — 82962 GLUCOSE BLOOD TEST: CPT

## 2024-01-18 PROCEDURE — C9113 INJ PANTOPRAZOLE SODIUM, VIA: HCPCS

## 2024-01-18 PROCEDURE — 6360000002 HC RX W HCPCS: Performed by: NURSE PRACTITIONER

## 2024-01-18 PROCEDURE — 6370000000 HC RX 637 (ALT 250 FOR IP): Performed by: INTERNAL MEDICINE

## 2024-01-18 PROCEDURE — 2580000003 HC RX 258: Performed by: SPECIALIST

## 2024-01-18 PROCEDURE — 99232 SBSQ HOSP IP/OBS MODERATE 35: CPT | Performed by: NURSE PRACTITIONER

## 2024-01-18 PROCEDURE — 83735 ASSAY OF MAGNESIUM: CPT

## 2024-01-18 PROCEDURE — 80048 BASIC METABOLIC PNL TOTAL CA: CPT

## 2024-01-18 RX ORDER — GLYCOPYRROLATE 0.2 MG/ML
0.2 INJECTION INTRAMUSCULAR; INTRAVENOUS EVERY 4 HOURS PRN
Status: DISCONTINUED | OUTPATIENT
Start: 2024-01-18 | End: 2024-01-18 | Stop reason: HOSPADM

## 2024-01-18 RX ORDER — MORPHINE SULFATE 4 MG/ML
4 INJECTION, SOLUTION INTRAMUSCULAR; INTRAVENOUS
Status: DISCONTINUED | OUTPATIENT
Start: 2024-01-18 | End: 2024-01-18 | Stop reason: HOSPADM

## 2024-01-18 RX ORDER — LORAZEPAM 2 MG/ML
0.5 INJECTION INTRAMUSCULAR
Status: DISCONTINUED | OUTPATIENT
Start: 2024-01-18 | End: 2024-01-18 | Stop reason: HOSPADM

## 2024-01-18 RX ORDER — LORAZEPAM 2 MG/ML
1 INJECTION INTRAMUSCULAR
Status: DISCONTINUED | OUTPATIENT
Start: 2024-01-18 | End: 2024-01-18 | Stop reason: HOSPADM

## 2024-01-18 RX ORDER — MORPHINE SULFATE 2 MG/ML
2 INJECTION, SOLUTION INTRAMUSCULAR; INTRAVENOUS
Status: DISCONTINUED | OUTPATIENT
Start: 2024-01-18 | End: 2024-01-18 | Stop reason: HOSPADM

## 2024-01-18 RX ADMIN — MINERAL OIL, PETROLATUM: 425; 568 OINTMENT OPHTHALMIC at 11:37

## 2024-01-18 RX ADMIN — CEFEPIME 1000 MG: 1 INJECTION, POWDER, FOR SOLUTION INTRAMUSCULAR; INTRAVENOUS at 11:29

## 2024-01-18 RX ADMIN — SODIUM CHLORIDE, PRESERVATIVE FREE 40 MG: 5 INJECTION INTRAVENOUS at 10:31

## 2024-01-18 RX ADMIN — VANCOMYCIN HYDROCHLORIDE 1000 MG: 1 INJECTION, POWDER, LYOPHILIZED, FOR SOLUTION INTRAVENOUS at 11:33

## 2024-01-18 RX ADMIN — FENTANYL CITRATE 150 MCG/HR: 0.05 INJECTION, SOLUTION INTRAMUSCULAR; INTRAVENOUS at 02:30

## 2024-01-18 RX ADMIN — IPRATROPIUM BROMIDE AND ALBUTEROL SULFATE 1 DOSE: .5; 2.5 SOLUTION RESPIRATORY (INHALATION) at 05:28

## 2024-01-18 RX ADMIN — HEPARIN SODIUM AND DEXTROSE 15.01 UNITS/KG/HR: 10000; 5 INJECTION INTRAVENOUS at 04:59

## 2024-01-18 RX ADMIN — MORPHINE SULFATE 4 MG: 4 INJECTION, SOLUTION INTRAMUSCULAR; INTRAVENOUS at 13:59

## 2024-01-18 RX ADMIN — MINERAL OIL, PETROLATUM: 425; 568 OINTMENT OPHTHALMIC at 06:42

## 2024-01-18 RX ADMIN — METHYLPREDNISOLONE SODIUM SUCCINATE 125 MG: 125 INJECTION, POWDER, LYOPHILIZED, FOR SOLUTION INTRAMUSCULAR; INTRAVENOUS at 04:30

## 2024-01-18 RX ADMIN — IPRATROPIUM BROMIDE AND ALBUTEROL SULFATE 1 DOSE: .5; 2.5 SOLUTION RESPIRATORY (INHALATION) at 01:34

## 2024-01-18 RX ADMIN — MORPHINE SULFATE 2 MG: 2 INJECTION, SOLUTION INTRAMUSCULAR; INTRAVENOUS at 13:38

## 2024-01-18 RX ADMIN — MORPHINE SULFATE 2 MG: 2 INJECTION, SOLUTION INTRAMUSCULAR; INTRAVENOUS at 13:11

## 2024-01-18 RX ADMIN — HEPARIN SODIUM 2130 UNITS: 1000 INJECTION INTRAVENOUS; SUBCUTANEOUS at 10:31

## 2024-01-18 RX ADMIN — SODIUM CHLORIDE, PRESERVATIVE FREE 10 ML: 5 INJECTION INTRAVENOUS at 10:31

## 2024-01-18 RX ADMIN — DOCUSATE SODIUM 100 MG: 50 LIQUID ORAL at 10:31

## 2024-01-18 RX ADMIN — SODIUM CHLORIDE: 9 INJECTION, SOLUTION INTRAVENOUS at 11:14

## 2024-01-18 RX ADMIN — IPRATROPIUM BROMIDE AND ALBUTEROL SULFATE 1 DOSE: .5; 2.5 SOLUTION RESPIRATORY (INHALATION) at 10:09

## 2024-01-18 RX ADMIN — MORPHINE SULFATE 2 MG: 2 INJECTION, SOLUTION INTRAMUSCULAR; INTRAVENOUS at 14:52

## 2024-01-18 RX ADMIN — GLYCOPYRROLATE 0.2 MG: 0.2 INJECTION INTRAMUSCULAR; INTRAVENOUS at 13:10

## 2024-01-18 RX ADMIN — 0.12% CHLORHEXIDINE GLUCONATE 15 ML: 1.2 RINSE ORAL at 10:31

## 2024-01-18 RX ADMIN — Medication 8 MG/HR: at 04:38

## 2024-01-18 RX ADMIN — CEFEPIME 1000 MG: 1 INJECTION, POWDER, FOR SOLUTION INTRAMUSCULAR; INTRAVENOUS at 00:11

## 2024-01-18 RX ADMIN — MINERAL OIL, PETROLATUM: 425; 568 OINTMENT OPHTHALMIC at 00:17

## 2024-01-18 RX ADMIN — METHYLPREDNISOLONE SODIUM SUCCINATE 125 MG: 125 INJECTION, POWDER, LYOPHILIZED, FOR SOLUTION INTRAMUSCULAR; INTRAVENOUS at 11:36

## 2024-01-18 RX ADMIN — LORAZEPAM 0.5 MG: 2 INJECTION INTRAMUSCULAR; INTRAVENOUS at 13:10

## 2024-01-18 RX ADMIN — MINERAL OIL, PETROLATUM: 425; 568 OINTMENT OPHTHALMIC at 04:30

## 2024-01-18 RX ADMIN — FENTANYL CITRATE 75 MCG/HR: 0.05 INJECTION, SOLUTION INTRAMUSCULAR; INTRAVENOUS at 11:27

## 2024-01-18 RX ADMIN — BUDESONIDE 500 MCG: 0.5 SUSPENSION RESPIRATORY (INHALATION) at 05:28

## 2024-01-18 RX ADMIN — HEPARIN SODIUM AND DEXTROSE 17 UNITS/KG/HR: 10000; 5 INJECTION INTRAVENOUS at 10:34

## 2024-01-18 ASSESSMENT — PULMONARY FUNCTION TESTS
PIF_VALUE: 32
PIF_VALUE: 35
PIF_VALUE: 31
PIF_VALUE: 35
PIF_VALUE: 34
PIF_VALUE: 27
PIF_VALUE: 36
PIF_VALUE: 33
PIF_VALUE: 36
PIF_VALUE: 17
PIF_VALUE: 30
PIF_VALUE: 32
PIF_VALUE: 32
PIF_VALUE: 35
PIF_VALUE: 42
PIF_VALUE: 36
PIF_VALUE: 34
PIF_VALUE: 33
PIF_VALUE: 31
PIF_VALUE: 36

## 2024-01-18 NOTE — DISCHARGE SUMMARY
[facility] Hospitalist   I reviewed labs imaging discussed with the nurse and examined the patient earlier today    Patient is sedated and ventilated  Normocephalic, without obvious abnormality, atraumatic, external ears without lesions,   Neck  cervical lymphadenopathy with limited exam for motion due to ET tube  Heart has a regular rate and rhythm no murmur  Lungs are clear to auscultation bilaterally with equal movements with the additional sounds of transmitted airway sounds from the ventilator.  Abdomen is soft nontender nondistended no rebound or guarding.  edema good peripheral perfusion.  No significant rashes or new skin lesions.  Affect and neuro exam limited since he is sedated on the ventilator    Acute on chronic resp failure due to COVID 19   intubated   Bilateral healthcare acquired pneumonia/COVID pneumonia   D-dimer elevated couldn't do CTA due to renal failure    Acute renal failure -- resolved  hypernatremia now normal    During this calendar day the family who is remote decided to withdraw care at the  is a nursing home and the daughter is in [place] and describes it as being too far away to get there in time.  So her life support was discontinued    pronounced  on 2024 at 1514 hours,  Eastern standard time, on the eighteenth day of .  Pronouncement performed by intensivist.      NOTE: This report was transcribed using voice recognition software. Every effort was made to ensure accuracy; however, inadvertent computerized transcription errors may be present.  Electronically signed by MERVAT MOREJON MD on 2024 at 5:13 PM    
I certify as stated above.

## 2024-01-18 NOTE — PLAN OF CARE
Problem: Skin/Tissue Integrity  Goal: Absence of new skin breakdown  Description: 1.  Monitor for areas of redness and/or skin breakdown  2.  Assess vascular access sites hourly  3.  Every 4-6 hours minimum:  Change oxygen saturation probe site  4.  Every 4-6 hours:  If on nasal continuous positive airway pressure, respiratory therapy assess nares and determine need for appliance change or resting period.  1/15/2024 0257 by Dasia Dexter RN  Outcome: Progressing    Problem: Safety - Adult  Goal: Free from fall injury  1/15/2024 0257 by Dasia Dexter RN  Outcome: Progressing       Problem: ABCDS Injury Assessment  Goal: Absence of physical injury  1/15/2024 0257 by Dasia Dexter RN  Outcome: Progressing    Problem: Confusion  Goal: Confusion, delirium, dementia, or psychosis is improved or at baseline  Description: INTERVENTIONS:  1. Assess for possible contributors to thought disturbance, including medications, impaired vision or hearing, underlying metabolic abnormalities, dehydration, psychiatric diagnoses, and notify attending LIP  2. [place] high risk fall precautions, as indicated  3. Provide frequent short contacts to provide reality reorientation, refocusing and direction  4. Decrease environmental stimuli, including noise as appropriate  5. Monitor and intervene to maintain adequate nutrition, hydration, elimination, sleep and activity  6. If unable to ensure safety without constant attention obtain sitter and review sitter guidelines with assigned personnel  7. Initiate Psychosocial CNS and Spiritual Care consult, as indicated  1/15/2024 0257 by Dasia Dexter RN  Outcome: Progressing       Problem: Safety - Medical Restraint  Goal: Remains free of injury from restraints (Restraint for Interference with Medical Device)  Description: INTERVENTIONS:  1. Determine that other, less restrictive measures have been tried or would not be effective before applying the restraint  2. Evaluate the 
  Problem: Discharge Planning  Goal: Discharge to home or other facility with appropriate resources  1/15/2024 2332 by Arthur Cardona RN  Outcome: Progressing  1/15/2024 1842 by Nadine Buck RN  Outcome: Progressing     Problem: Skin/Tissue Integrity  Goal: Absence of new skin breakdown  Description: 1.  Monitor for areas of redness and/or skin breakdown  2.  Assess vascular access sites hourly  3.  Every 4-6 hours minimum:  Change oxygen saturation probe site  4.  Every 4-6 hours:  If on nasal continuous positive airway pressure, respiratory therapy assess nares and determine need for appliance change or resting period.  1/15/2024 2332 by Arthur Cardona RN  Outcome: Progressing  1/15/2024 1842 by Nadine Buck RN  Outcome: Progressing     Problem: Safety - Adult  Goal: Free from fall injury  1/15/2024 2332 by Arthur Cardona RN  Outcome: Progressing  1/15/2024 1842 by Nadine Buck RN  Outcome: Progressing     Problem: ABCDS Injury Assessment  Goal: Absence of physical injury  1/15/2024 2332 by Arthur Cardona RN  Outcome: Progressing  1/15/2024 1842 by Nadine Buck RN  Outcome: Progressing     Problem: Safety - Medical Restraint  Goal: Remains free of injury from restraints (Restraint for Interference with Medical Device)  Description: INTERVENTIONS:  1. Determine that other, less restrictive measures have been tried or would not be effective before applying the restraint  2. Evaluate the patient's condition at the time of restraint application  3. Inform patient/family regarding the reason for restraint  4. Q2H: Monitor safety, psychosocial status, comfort, nutrition and hydration  1/15/2024 2332 by Arthur Cardona RN  Outcome: Progressing  Flowsheets  Taken 1/15/2024 2200  Remains free of injury from restraints (restraint for interference with medical device): Every 2 hours: Monitor safety, psychosocial status, comfort, nutrition and hydration  Taken 1/15/2024 2000  Remains free of injury from restraints 
  Problem: Discharge Planning  Goal: Discharge to home or other facility with appropriate resources  Outcome: Progressing     Problem: Skin/Tissue Integrity  Goal: Absence of new skin breakdown  Description: 1.  Monitor for areas of redness and/or skin breakdown  2.  Assess vascular access sites hourly  3.  Every 4-6 hours minimum:  Change oxygen saturation probe site  4.  Every 4-6 hours:  If on nasal continuous positive airway pressure, respiratory therapy assess nares and determine need for appliance change or resting period.  Outcome: Progressing     Problem: Safety - Adult  Goal: Free from fall injury  Outcome: Progressing     Problem: ABCDS Injury Assessment  Goal: Absence of physical injury  Outcome: Progressing     Problem: Confusion  Goal: Confusion, delirium, dementia, or psychosis is improved or at baseline  Description: INTERVENTIONS:  1. Assess for possible contributors to thought disturbance, including medications, impaired vision or hearing, underlying metabolic abnormalities, dehydration, psychiatric diagnoses, and notify attending LIP  2. [place] high risk fall precautions, as indicated  3. Provide frequent short contacts to provide reality reorientation, refocusing and direction  4. Decrease environmental stimuli, including noise as appropriate  5. Monitor and intervene to maintain adequate nutrition, hydration, elimination, sleep and activity  6. If unable to ensure safety without constant attention obtain sitter and review sitter guidelines with assigned personnel  7. Initiate Psychosocial CNS and Spiritual Care consult, as indicated  Outcome: Progressing     Problem: Safety - Medical Restraint  Goal: Remains free of injury from restraints (Restraint for Interference with Medical Device)  Description: INTERVENTIONS:  1. Determine that other, less restrictive measures have been tried or would not be effective before applying the restraint  2. Evaluate the patient's condition at the time of 
  Problem: Discharge Planning  Goal: Discharge to home or other facility with appropriate resources  Outcome: Progressing  Flowsheets (Taken 1/13/2024 2303)  Discharge to home or other facility with appropriate resources: Identify barriers to discharge with patient and caregiver     Problem: Skin/Tissue Integrity  Goal: Absence of new skin breakdown  Description: 1.  Monitor for areas of redness and/or skin breakdown  2.  Assess vascular access sites hourly  3.  Every 4-6 hours minimum:  Change oxygen saturation probe site  4.  Every 4-6 hours:  If on nasal continuous positive airway pressure, respiratory therapy assess nares and determine need for appliance change or resting period.  Outcome: Progressing     Problem: Safety - Adult  Goal: Free from fall injury  Outcome: Progressing  Flowsheets (Taken 1/14/2024 0432)  Free From Fall Injury: Instruct family/caregiver on patient safety     Problem: ABCDS Injury Assessment  Goal: Absence of physical injury  Outcome: Progressing  Flowsheets (Taken 1/14/2024 0432)  Absence of Physical Injury: Implement safety measures based on patient assessment   Care plan reviewed with patient.  Patient verbalizes understanding of the care plan and contributed to goal setting.   
  Problem: Safety - Medical Restraint  Goal: Remains free of injury from restraints (Restraint for Interference with Medical Device)  Description: INTERVENTIONS:  1. Determine that other, less restrictive measures have been tried or would not be effective before applying the restraint  2. Evaluate the patient's condition at the time of restraint application  3. Inform patient/family regarding the reason for restraint  4. Q2H: Monitor safety, psychosocial status, comfort, nutrition and hydration  Outcome: Progressing  Flowsheets  Taken 1/18/2024 0200  Remains free of injury from restraints (restraint for interference with medical device): Every 2 hours: Monitor safety, psychosocial status, comfort, nutrition and hydration  Taken 1/18/2024 0000  Remains free of injury from restraints (restraint for interference with medical device): Every 2 hours: Monitor safety, psychosocial status, comfort, nutrition and hydration  Taken 1/17/2024 2321  Remains free of injury from restraints (restraint for interference with medical device): Every 2 hours: Monitor safety, psychosocial status, comfort, nutrition and hydration  Taken 1/17/2024 2200  Remains free of injury from restraints (restraint for interference with medical device): Every 2 hours: Monitor safety, psychosocial status, comfort, nutrition and hydration  Taken 1/17/2024 2000  Remains free of injury from restraints (restraint for interference with medical device): Every 2 hours: Monitor safety, psychosocial status, comfort, nutrition and hydration     
  Problem: Skin/Tissue Integrity  Goal: Absence of new skin breakdown  Description: 1.  Monitor for areas of redness and/or skin breakdown  2.  Assess vascular access sites hourly  3.  Every 4-6 hours minimum:  Change oxygen saturation probe site  4.  Every 4-6 hours:  If on nasal continuous positive airway pressure, respiratory therapy assess nares and determine need for appliance change or resting period.  Outcome: Progressing     Problem: Safety - Adult  Goal: Free from fall injury  Outcome: Progressing     Problem: ABCDS Injury Assessment  Goal: Absence of physical injury  Outcome: Progressing     Problem: Pain  Goal: Verbalizes/displays adequate comfort level or baseline comfort level  Outcome: Progressing     Problem: Discharge Planning  Goal: Discharge to home or other facility with appropriate resources  Outcome: Not Progressing  Flowsheets (Taken 1/14/2024 0810 by Ly Guzman RN)  Discharge to home or other facility with appropriate resources:   Identify barriers to discharge with patient and caregiver   Arrange for needed discharge resources and transportation as appropriate   Identify discharge learning needs (meds, wound care, etc)   Refer to discharge planning if patient needs post-hospital services based on physician order or complex needs related to functional status, cognitive ability or social support system     Problem: Confusion  Goal: Confusion, delirium, dementia, or psychosis is improved or at baseline  Description: INTERVENTIONS:  1. Assess for possible contributors to thought disturbance, including medications, impaired vision or hearing, underlying metabolic abnormalities, dehydration, psychiatric diagnoses, and notify attending LIP  2. [place] high risk fall precautions, as indicated  3. Provide frequent short contacts to provide reality reorientation, refocusing and direction  4. Decrease environmental stimuli, including noise as appropriate  5. Monitor and intervene to maintain 
consult, as indicated  1/17/2024 0703 by Arthur Cardona RN  Outcome: Progressing  1/16/2024 2038 by Nadine Buck RN  Outcome: Progressing     Problem: Safety - Medical Restraint  Goal: Remains free of injury from restraints (Restraint for Interference with Medical Device)  Description: INTERVENTIONS:  1. Determine that other, less restrictive measures have been tried or would not be effective before applying the restraint  2. Evaluate the patient's condition at the time of restraint application  3. Inform patient/family regarding the reason for restraint  4. Q2H: Monitor safety, psychosocial status, comfort, nutrition and hydration  1/17/2024 0703 by Arthur Cardona RN  Outcome: Progressing  Flowsheets  Taken 1/17/2024 0600  Remains free of injury from restraints (restraint for interference with medical device): Every 2 hours: Monitor safety, psychosocial status, comfort, nutrition and hydration  Taken 1/17/2024 0400  Remains free of injury from restraints (restraint for interference with medical device): Every 2 hours: Monitor safety, psychosocial status, comfort, nutrition and hydration  Taken 1/17/2024 0200  Remains free of injury from restraints (restraint for interference with medical device): Every 2 hours: Monitor safety, psychosocial status, comfort, nutrition and hydration  Taken 1/17/2024 0000  Remains free of injury from restraints (restraint for interference with medical device): Every 2 hours: Monitor safety, psychosocial status, comfort, nutrition and hydration  Taken 1/16/2024 2200  Remains free of injury from restraints (restraint for interference with medical device): Every 2 hours: Monitor safety, psychosocial status, comfort, nutrition and hydration  1/16/2024 2038 by Nadine Buck RN  Outcome: Progressing  Flowsheets  Taken 1/16/2024 2000 by Arthur Cardona RN  Remains free of injury from restraints (restraint for interference with medical device): Every 2 hours: Monitor safety, psychosocial 
medications, impaired vision or hearing, underlying metabolic abnormalities, dehydration, psychiatric diagnoses, notify [facility]   [place] high risk fall precautions, as indicated   Provide frequent short contacts to provide reality reorientation, refocusing and direction   Decrease environmental stimuli, including noise as appropriate   Monitor and intervene to maintain adequate nutrition, hydration, elimination, sleep and activity     Problem: Safety - Medical Restraint  Goal: Remains free of injury from restraints (Restraint for Interference with Medical Device)  Description: INTERVENTIONS:  1. Determine that other, less restrictive measures have been tried or would not be effective before applying the restraint  2. Evaluate the patient's condition at the time of restraint application  3. Inform patient/family regarding the reason for restraint  4. Q2H: Monitor safety, psychosocial status, comfort, nutrition and hydration  1/14/2024 1919 by Elma Saleh, RN  Outcome: Progressing  1/14/2024 1843 by Elma Saleh, RN  Outcome: Not Progressing  Flowsheets (Taken 1/14/2024 0963)  Remains free of injury from restraints (restraint for interference with medical device):   Evaluate the patient's condition at the time of restraint application   Inform patient/family regarding the reason for restraint   Every 2 hours: Monitor safety, psychosocial status, comfort, nutrition and hydration   Determine that other, less restrictive measures have been tried or would not be effective before applying the restraint     
restraint application  3. Inform patient/family regarding the reason for restraint  4. Q2H: Monitor safety, psychosocial status, comfort, nutrition and hydration  Outcome: Progressing  Flowsheets  Taken 1/15/2024 1800  Remains free of injury from restraints (restraint for interference with medical device): Every 2 hours: Monitor safety, psychosocial status, comfort, nutrition and hydration  Taken 1/15/2024 1600  Remains free of injury from restraints (restraint for interference with medical device): Every 2 hours: Monitor safety, psychosocial status, comfort, nutrition and hydration  Taken 1/15/2024 1400  Remains free of injury from restraints (restraint for interference with medical device): Every 2 hours: Monitor safety, psychosocial status, comfort, nutrition and hydration  Taken 1/15/2024 1200  Remains free of injury from restraints (restraint for interference with medical device): Every 2 hours: Monitor safety, psychosocial status, comfort, nutrition and hydration  Taken 1/15/2024 1000  Remains free of injury from restraints (restraint for interference with medical device): Every 2 hours: Monitor safety, psychosocial status, comfort, nutrition and hydration  Taken 1/15/2024 0956  Remains free of injury from restraints (restraint for interference with medical device): Every 2 hours: Monitor safety, psychosocial status, comfort, nutrition and hydration  Taken 1/15/2024 0800  Remains free of injury from restraints (restraint for interference with medical device): Every 2 hours: Monitor safety, psychosocial status, comfort, nutrition and hydration     Problem: Pain  Goal: Verbalizes/displays adequate comfort level or baseline comfort level  Outcome: Progressing     Problem: Nutrition Deficit:  Goal: Optimize nutritional status  1/15/2024 1842 by Nadine Buck RN  Outcome: Progressing     Problem: Respiratory - Adult  Goal: Achieves optimal ventilation and oxygenation  Outcome: Progressing     Problem: Cardiovascular

## 2024-01-18 NOTE — SIGNIFICANT EVENT
Death Pronouncement    Harsh Michaels was pronounced  on 2024   at 1514 hours,  Eastern standard time, on the eighteenth day of January, 2024.      Assessment:    Electrical silence noted in the cardiac monitor   There were no heart tones present.  There were no breath sounds or spontaneous respirations.  There was no palpable carotid pulse.  The pupils were fixed and dilated.  There was no response to noxious stimuli.  Absent corneal reflex  Absent conjunctival reflex   Absent Gag reflex  Absent cough reflex    Physical examination findings are  consistent with death.       Notification:  The next of kin was notified.    Medical Examiner Notification:  Patient met criteria for notification. Medical Examiner was notified .           Electronically signed by Zoran Natarajan MD   2024  3:34 PM

## 2024-01-18 NOTE — CARE COORDINATION
1/18/2024 Positive Covid 1/13/24.  ICU, Vent at 100%, peep 12, sedation vacations noted. Pt with COPD, ARDS, Covid Pneumonia, Questionable lung mass. Pt was at [facility] prior to admission, Pat following. Pts  Venkatesh is LTC at same facility. CM and Palliative care spoke to Venkatesh- primary legal decision maker. IF needs to return to SNF- will NEED PRECERT, signed INOCENCIA at discharge. Referral to Anders with [facility] to follow. Spiritual care and Palliative care following. Electronically signed by Dayanna Vasquez RN-BC on 1/18/2024 at 11:02 AM

## 2024-01-18 NOTE — CARE COORDINATION
1/18/2024 Plans for compassionate extubation. [facility] aware of updated plans. Cm to follow.  Electronically signed by JITENDRA De Jesus on 1/18/2024 at 1:21 PM

## 2024-01-19 LAB
1,3 BETA GLUCAN SER-MCNC: 246 PG/ML
1,3 BETA-D-GLUCAN INTERP: POSITIVE
HISTOPLASMA GALACTOMANNAN AG, URINE: NOT DETECTED NG/ML
INTERPRETATION: NOT DETECTED
MICROORGANISM SPEC CULT: NORMAL
SERVICE CMNT-IMP: NORMAL
SPECIMEN DESCRIPTION: NORMAL

## 2024-01-19 NOTE — PROGRESS NOTES
[facility] Hospitalist Progress Note    Admitting Date and Time: 1/13/2024 11:52 AM  Admit Dx: Hypoxia [R09.02]  KENDALL (acute kidney injury) (HCC) [N17.9]  Pneumonia due to infectious organism, unspecified laterality, unspecified part of lung [J18.9]  Pneumonia due to COVID-19 virus [U07.1, J12.82]  COVID-19 [U07.1]    Subjective:  Patient is being followed for Hypoxia [R09.02]  KENDALL (acute kidney injury) (HCC) [N17.9]  Pneumonia due to infectious organism, unspecified laterality, unspecified part of lung [J18.9]  Pneumonia due to COVID-19 virus [U07.1, J12.82]  COVID-19 [U07.1]     Tolerating tube feeds  No fevers  No events overnight  Sedated/intubated  Tmax 99.7  On Levophed    Per RN: nothing to report    ROS: unable to obtain -- the pt is sedated/intubated     vancomycin (VANCOCIN) intermittent dosing (placeholder)   Other RX Placeholder    docusate  100 mg Oral BID    pantoprazole (PROTONIX) 40 mg in sodium chloride (PF) 0.9 % 10 mL injection  40 mg IntraVENous Daily    chlorhexidine  15 mL Mouth/Throat BID    artificial tears   Both Eyes Q4H    cefepime  1,000 mg IntraVENous Q12H    insulin lispro  0-4 Units SubCUTAneous Q4H    dexAMETHasone  6 mg IntraVENous Q24H    sodium chloride flush  5-40 mL IntraVENous 2 times per day    budesonide  0.5 mg Nebulization BID RT    ipratropium 0.5 mg-albuterol 2.5 mg  1 Dose Inhalation Q4H RT     heparin (porcine), 40 Units/kg, PRN  heparin (porcine), 80 Units/kg, PRN  bisacodyl, 10 mg, Daily PRN  Petrolatum, , BID PRN  medicated lip balm, , PRN  glucose, 4 tablet, PRN  dextrose bolus, 125 mL, PRN   Or  dextrose bolus, 250 mL, PRN  glucagon (rDNA), 1 mg, PRN  dextrose, , Continuous PRN  sodium chloride flush, 5-40 mL, PRN  sodium chloride, , PRN  polyethylene glycol, 17 g, Daily PRN  acetaminophen, 650 mg, Q6H PRN   Or  acetaminophen, 650 mg, Q6H PRN  prochlorperazine, 10 mg, Q6H PRN         Objective:    /70   Pulse 83   Temp 99.3 °F (37.4 °C) (Bladder)   
       [facility] Hospitalist Progress Note    Admitting Date and Time: 1/13/2024 11:52 AM  Admit Dx: Hypoxia [R09.02]  KENDALL (acute kidney injury) (HCC) [N17.9]  Pneumonia due to infectious organism, unspecified laterality, unspecified part of lung [J18.9]  Pneumonia due to COVID-19 virus [U07.1, J12.82]  COVID-19 [U07.1]    Subjective:  Patient is being followed for Hypoxia [R09.02]  KENDALL (acute kidney injury) (HCC) [N17.9]  Pneumonia due to infectious organism, unspecified laterality, unspecified part of lung [J18.9]  Pneumonia due to COVID-19 virus [U07.1, J12.82]  COVID-19 [U07.1]     Pt intubated yesterday.  No events overnight  No fevers    Per RN:   PEEP increased to 12   Levophed down to 1 (from 7 this AM) -- improving  Sedation turned down -- Versed/fentanyl decreased    ROS: unable to obtain -- pt is intubated/mechanically ventilated     docusate  100 mg Oral BID    pantoprazole (PROTONIX) 40 mg in sodium chloride (PF) 0.9 % 10 mL injection  40 mg IntraVENous Daily    chlorhexidine  15 mL Mouth/Throat BID    artificial tears   Both Eyes Q4H    cefepime  1,000 mg IntraVENous Q12H    insulin lispro  0-4 Units SubCUTAneous Q4H    dexAMETHasone  6 mg IntraVENous Q24H    sodium chloride flush  5-40 mL IntraVENous 2 times per day    budesonide  0.5 mg Nebulization BID RT    ipratropium 0.5 mg-albuterol 2.5 mg  1 Dose Inhalation Q4H RT    azithromycin  500 mg IntraVENous Q24H     heparin (porcine), 40 Units/kg, PRN  heparin (porcine), 80 Units/kg, PRN  bisacodyl, 10 mg, Daily PRN  Petrolatum, , BID PRN  medicated lip balm, , PRN  glucose, 4 tablet, PRN  dextrose bolus, 125 mL, PRN   Or  dextrose bolus, 250 mL, PRN  glucagon (rDNA), 1 mg, PRN  dextrose, , Continuous PRN  sodium chloride flush, 5-40 mL, PRN  sodium chloride, , PRN  polyethylene glycol, 17 g, Daily PRN  acetaminophen, 650 mg, Q6H PRN   Or  acetaminophen, 650 mg, Q6H PRN  prochlorperazine, 10 mg, Q6H PRN         Objective:    BP 96/70   Pulse 77   
       [facility] Hospitalist Progress Note    Admitting Date and Time: 1/13/2024 11:52 AM  Admit Dx: Hypoxia [R09.02]  KENDALL (acute kidney injury) (HCC) [N17.9]  Pneumonia due to infectious organism, unspecified laterality, unspecified part of lung [J18.9]  Pneumonia due to COVID-19 virus [U07.1, J12.82]  COVID-19 [U07.1]    Subjective:  Patient is being followed for Hypoxia [R09.02]  KENDALL (acute kidney injury) (HCC) [N17.9]  Pneumonia due to infectious organism, unspecified laterality, unspecified part of lung [J18.9]  Pneumonia due to COVID-19 virus [U07.1, J12.82]  COVID-19 [U07.1]     Tolerating tube feeds  No fevers  No events overnight  Sedated/intubated  On Levophed    Per RN: nothing to report    ROS: unable to obtain -- the pt is sedated/intubated     sodium phosphate IVPB (CENTRAL line)  30 mmol IntraVENous Once    methylPREDNISolone  125 mg IntraVENous Q8H    vancomycin (VANCOCIN) intermittent dosing (placeholder)   Other RX Placeholder    docusate  100 mg Oral BID    pantoprazole (PROTONIX) 40 mg in sodium chloride (PF) 0.9 % 10 mL injection  40 mg IntraVENous Daily    chlorhexidine  15 mL Mouth/Throat BID    artificial tears   Both Eyes Q4H    cefepime  1,000 mg IntraVENous Q12H    insulin lispro  0-4 Units SubCUTAneous Q4H    sodium chloride flush  5-40 mL IntraVENous 2 times per day    budesonide  0.5 mg Nebulization BID RT    ipratropium 0.5 mg-albuterol 2.5 mg  1 Dose Inhalation Q4H RT     heparin (porcine), 40 Units/kg, PRN  heparin (porcine), 80 Units/kg, PRN  bisacodyl, 10 mg, Daily PRN  Petrolatum, , BID PRN  medicated lip balm, , PRN  glucose, 4 tablet, PRN  dextrose bolus, 125 mL, PRN   Or  dextrose bolus, 250 mL, PRN  glucagon (rDNA), 1 mg, PRN  dextrose, , Continuous PRN  sodium chloride flush, 5-40 mL, PRN  sodium chloride, , PRN  polyethylene glycol, 17 g, Daily PRN  acetaminophen, 650 mg, Q6H PRN   Or  acetaminophen, 650 mg, Q6H PRN  prochlorperazine, 10 mg, Q6H PRN     
    NAME: Harsh Michaels  MR:  [id number]  :   1945  Admit Date:  2024    Elements of this note, were copied and pasted from Previous. Updates have been made where noted and reflect current exam and medical decision making from the DOS of this encounter.  CHIEF COMPLAINT       Chief Complaint   Patient presents with    Shortness of Breath     From [facility] for ams/sob, hypoxia on her normal NC 2-3L, full code     HISTORY OF PRESENT ILLNESS     Harsh Michaels is a 79 y.o. female admitted on 2024 and has  has a past medical history of Bronchitis, Depression, GERD (gastroesophageal reflux disease), Hyperlipidemia, Hypertension, Irritable bowel, Kidney stone, Migraines, and Thyroid disease.     This is a face to face encounter   24 - Tmax- 99.3  she remains on vent- FIO2- 100%  In ICU - poor prognosis     2024-patient is on 100% FIO2- on vent- tmax- 99.7 at midnight.   WBC- 31.0     1/15/2024-ac100% sedated AFEBRILE CR1.4 WBC23.1    in icu intubated afebrile cr1.4  na165 wbc20.9     Patient is tolerating medications. No reported adverse drug reactions.  Available labs, imaging studies, microbiologic studies have been reviewed      Assessment & Plan     Admitted for   Hypoxia [R09.02]  KENDALL (acute kidney injury) (HCC) [N17.9]  Pneumonia due to infectious organism, unspecified laterality, unspecified part of lung [J18.9]  Pneumonia due to COVID-19 virus [U07.1, J12.82]  COVID-19 [U07.1]  ID following for   Respiratory failure TRACH CX GPR   Covid intubated - s/p Actemra on 2024   Sepsis   Leukocytosis   Right lower lobe - soft tissue mass       Plan:  Finished- azithromycin   Continue cefepime IV every 12 hours  Received vancomycin today   Add diflucan today   Pharmacy dosing   WBC- 33.0 today   On steroids - on solumedrol   Had CT of chest today   Blood cultures- no growth to date  Respiratory culture-evaluating for yeast   Check fungitell   2024- Procal- 0.31  Check urine 
    NAME: Harsh Michaels  MR:  [id number]  :   1945  Admit Date:  2024    Elements of this note, were copied and pasted from Previous. Updates have been made where noted and reflect current exam and medical decision making from the DOS of this encounter.  CHIEF COMPLAINT       Chief Complaint   Patient presents with    Shortness of Breath     From [facility] for ams/sob, hypoxia on her normal NC 2-3L, full code     HISTORY OF PRESENT ILLNESS     Harsh iMchaels is a 79 y.o. female admitted on 2024 and has  has a past medical history of Bronchitis, Depression, GERD (gastroesophageal reflux disease), Hyperlipidemia, Hypertension, Irritable bowel, Kidney stone, Migraines, and Thyroid disease.     This is a face to face encounter   01/15/24 ac100% sedated AFEBRILE CR1.4 WBC23.1    in icu intubated afebrile cr1.4  na165 wbc20.9     Patient is tolerating medications. No reported adverse drug reactions.  Available labs, imaging studies, microbiologic studies have been reviewed      Assessment & Plan     Admitted for   Hypoxia [R09.02]  KENDALL (acute kidney injury) (HCC) [N17.9]  Pneumonia due to infectious organism, unspecified laterality, unspecified part of lung [J18.9]  Pneumonia due to COVID-19 virus [U07.1, J12.82]  COVID-19 [U07.1]  ID following for   Respiratory failure TRACH CX GPR   Covid intubated   Sepsis    Resp cx   Cont atbx       azithromycin (ZITHROMAX) 500 mg in sodium chloride 0.9 % 250 mL IVPB ([id number]), Q24H     Cont cefepime vanco level     DISPOSITION:  REVIEW OF SYSTEMS     As stated above      PHYSICAL EXAMINATION    BP 96/64   Pulse 77   Temp 97.7 °F (36.5 °C) (Bladder)   Resp 30   Ht 1.448 m (4' 9.01\")   Wt 43.1 kg (95 lb)   SpO2 97%   BMI 20.55 kg/m²   Temp  Av.7 °F (36.5 °C)  Min: 97.2 °F (36.2 °C)  Max: 98.2 °F (36.8 °C)  CONSTITUTIONAL: ntubated   ENT:  Normocephalic, atraumatic,    LUNGS:  No increased work of breathing, dec To auscultation bilaterally, no 
  Palliative Care Department  [phone]  Palliative Care Progress Note  Provider Torrie Wills, APRN - CNP     Harsh Michaels  [id number]  Hospital Day: 6  Date of Initial Consult: 1/17/2024  Referring Provider: Zoran Natarajan MD  Palliative Medicine was consulted for assistance with: goals of care    HPI:   Harsh Michaels is a 79 y.o. with a medical history of GERD, hyperlipidemia, hypertension, migraines, thyroid disease, COPD who was admitted on 1/13/2024 from nursing facility with a CHIEF COMPLAINT of increased shortness of breath and hypoxia, AMS.  She was brought in by EMS to the emergency room was found to be hypoxic with pulse ox in the 70s and she came to the emergency room she was started on BiPAP her oxygenation is in the upper 80s low 90s.  Her sodium was elevated 157.  She was also found to be in acute renal failure with creatinine 2.8 previously was within normal limit of 0.6.  Patient tested positive for COVID-19 infection.  Patient was transferred to ICU for further medical management and intubated due to worsening in respiratory status.  Palliative medicine consulted for further assistance.    ASSESSMENT/PLAN:     Pertinent Hospital Diagnoses     Acute on chronic respiratory failure  COVID-19 pneumonia  Acute renal failure  D-dimer elevated      Palliative Care Encounter / Counseling Regarding Goals of Care  Please see detailed goals of care discussion as below  At this time, Harsh Michaels, Does Not have capacity for medical decision-making.  Capacity is time limited and situation/question specific  During encounter Venkatesh & Deidre were surrogate medical decision-maker  Outcome of goals of care meeting:   Discussed comfort measures and compassionate extubation  Code status DNR-CC  Advanced Directives: no POA or living will in [facility]  Surrogate/Legal NOK:  Venkatesh Michaels (spouse) [phone]  Deidre Sanchez (child) [phone]    Spiritual assessment: no spiritual distress identified  Bereavement 
  Physician Progress Note      PATIENT:               BE MONSON  [facility] #:                  [id number]  :                       1945  ADMIT DATE:       2024 11:52 AM  DISCH DATE:        2024 3:16 PM  RESPONDING  PROVIDER #:        To Garcia MD          QUERY TEXT:    Pt admitted with COVID pneumonia.  Noted documentation of Sepsis on  in ID   consult and progress notes. If possible, please document in progress notes   and discharge summary:    The medical record reflects the following:  Risk Factors: COVID pneumonia, A/C respiratory failure, COPD, KENDALL  Clinical Indicators: : Wbc 20, Crp 182, Procal 1.01, Lactic 5.2, COVID +,   Resp cx pending, Cxr patchy opacities- pneumonia, HR92, RR29  Treatment: IVF bolus 1L, Vanc, Maxipime, Decadron, Zmax, Actemra, Levophed,   mechanical ventilation, ID, Crit care cx.    Thank you, Blanche Silva RN [facility]  [phone]  Options provided:  -- Sepsis present on admission due to COVID-19 pneumonia  -- Sepsis ruled out  -- Other - I will add my own diagnosis  -- Disagree - Not applicable / Not valid  -- Disagree - Clinically unable to determine / Unknown  -- Refer to Clinical Documentation Reviewer    PROVIDER RESPONSE TEXT:    This patient has sepsis which was present on admission due to COVID-19   pneumonia.    Query created by: Blanche Silva on 2024 7:12 AM      Electronically signed by:  To Garcia MD 2024 3:35 PM          
  Room #:  IC09/IC09-01  Date: 2024       Patient Name: Hrash Michaels  : 1945      MRN: [id number]    P.T. on hold due to intubation, please reorder PT EVAL AND TREAT when patient able to participate.    Thank you. Ernesto Olivera, PT      
4 Eyes Skin Assessment     NAME:  Harsh Michaels  YOB: 1945  MEDICAL RECORD NUMBER:  [id number]    The patient is being assessed for  Admission    I agree that at least one RN has performed a thorough Head to Toe Skin Assessment on the patient. ALL assessment sites listed below have been assessed.      Areas assessed by both nurses:    Head, Face, Ears, Shoulders, Back, Chest, Arms, Elbows, Hands, Sacrum. Buttock, Coccyx, Ischium, and Legs. Feet and Heels        Does the Patient have a Wound? Yes wound(s) were present on assessment. LDA wound assessment was Initiated and completed by RN       Johnie Prevention initiated by RN: Yes  Wound Care Orders initiated by RN: No    Pressure Injury (Stage 3,4, Unstageable, DTI, NWPT, and Complex wounds) if present, place Wound referral order by RN under : No    New Ostomies, if present place, Ostomy referral order under : No     Nurse 1 eSignature: Electronically signed by Mirian Summers RN on 1/14/24 at 12:35 AM EST    **SHARE this note so that the co-signing nurse can place an eSignature**    Nurse 2 eSignature: {Esignature:[id number]}   
4 Eyes Skin Assessment     NAME:  Harsh Michaels  YOB: 1945  MEDICAL RECORD NUMBER:  [id number]    The patient is being assessed for  Transfer to New Unit    I agree that at least one RN has performed a thorough Head to Toe Skin Assessment on the patient. ALL assessment sites listed below have been assessed.      Areas assessed by both nurses:    Head, Face, Ears, Shoulders, Back, Chest, Arms, Elbows, Hands, Sacrum. Buttock, Coccyx, Ischium, Legs. Feet and Heels, and Under Medical Devices         Does the Patient have a Wound? No noted wound(s)  Skin is overall dry and flaky.  Patient has abrasion to her right buttock/hip area.  Boggy/red heels bilaterally.  Protective foam placed on coccyx.       Johnie Prevention initiated by RN: Yes  Wound Care Orders initiated by RN: No    Pressure Injury (Stage 3,4, Unstageable, DTI, NWPT, and Complex wounds) if present, place Wound referral order by RN under : No    New Ostomies, if present place, Ostomy referral order under : No     Nurse 1 eSignature: Electronically signed by Elma Saleh RN on 1/14/24 at 10:58 AM EST    **SHARE this note so that the co-signing nurse can place an eSignature**    Nurse 2 eSignature: Electronically signed by Monalisa Burch RN on 1/14/24 at 12:50 PM EST   
Blood cultures x 2 obtained via venipuncture per order.    Blood culture 1 aerobic/anaerobic from left antecubital.    Blood culture 2 aerobic/anaerobic from right antecubital.    Nadine Buck RN  1/16/2024        
Comprehensive Nutrition Assessment    Type and Reason for Visit:  Reassess (TFOM)    Nutrition Recommendations/Plan:   Continue NPO status   Continue current TF regimen     Vital AF 1.2 @ 40ml/h with 270ml flush q4h -  1152kcal, 72g pro, 2399ml water per day        Malnutrition Assessment:  Malnutrition Status:  Insufficient data (01/15/24 1043)    Context:  Acute Illness     Findings of the 6 clinical characteristics of malnutrition:  Energy Intake:  Unable to assess (intubated)  Weight Loss:  Greater than 5% over 1 month     Body Fat Loss:  Unable to assess (COVID+ iso)     Muscle Mass Loss:  Unable to assess (COVID+ iso)    Fluid Accumulation:  No significant fluid accumulation     Strength:  Not Performed    Nutrition Assessment:    Pt remains at high nutr. risk d/t ongoing intubation/NPO status.  Pt admit w/ acute respiratory failure w/ HCAP/COVID+ PNA, also noted hypernatremia, KENDALL, dehydration. PMHx of COPD, HTN/HLD, hypothyroidism. Pt intubated and started on NG TF. Tolerating TF at goal Vital @ 40ml/h, will continue current regimen.    Nutrition Related Findings:    abd soft, flat, NT, ND, nonpitting edema, I/O's +6.87L since admit, intubated/sedated, COVID+ iso, MAP 75, BUN 26, Bglu 145-187, Alk Phos 206, AST 95/ , WBC 33, tolerating TF at goal Wound Type: None       Current Nutrition Intake & Therapies:    Average Meal Intake: NPO (on tube feedings)  Average Supplements Intake: NPO  Diet NPO  ADULT TUBE FEEDING; Nasogastric; Peptide Based; Continuous; 10; Yes; 10; Q 4 hours; 40; 270; Q 4 hours  Current Tube Feeding (TF) Orders:  Feeding Route: Nasogastric  Formula: Peptide Based  Schedule: Continuous  Feeding Regimen: 40ml/h (960ml TV)  Additives/Modulars:    Water Flushes: 270ml flush q4h  Current TF & Flush Orders Provides: at goal  Goal TF & Flush Orders Provides: 1152kcal, 72g pro, 2399ml water per day    Anthropometric Measures:  Height: 144.8 cm (4' 9\")  Ideal Body Weight (IBW): 85 lbs 
Date:2024  Patient Name: Harsh Michaels  MRN: [id number]  : 1945  ROOM #: IC09/IC09-01    Occupational Therapy on hold due to intubation; please reorder O.T. EVAL and TREAT when the patient is able to participate in therapy. Thank you.     Karsten Jones OTR/L #[id number]    
Dr. Natarajan updated with ABGs & Na results. See new order.    Nadine Buck RN  1/15/2024dermatitis    
During report patient is noted to have hypoxia while on bipap 100% FIO2 and 14/8. Continuous SPO2 placed and patient noted to be fluctuating with oxygen saturation from 87-91%. Dr Serrano, on consult for pulmonology called for possible ICU transfer.   
PULMONARY  CRITICAL CARE   SERVICE DAILY PROGRESS  NOTE     1/15/2024   Hospital  LOS: 2 days      Admit date- 1/13/2024       Initially admitted for -   Shortness of Breath (From [facility] for ams/sob, hypoxia on her normal NC 2-3L, full code)       Subjective:      Intubated and sedated   HD stable   Hypoxemia is severe     Review of Systems      Unable to obtain secondary to mental status. Pt is intubated and sedated                      Allergies:  Allergies   Allergen Reactions    Iodides Itching     IVP dye    Peanuts [Peanut Oil] Other (See Comments)     migraines    Penicillins      Doesn't recall reaction     Home Meds:  Prior to Admission medications    Medication Sig Start Date End Date Taking? Authorizing Provider   mirtazapine (REMERON) 15 MG tablet Take 0.5 tablets by mouth nightly   Yes Ovidio Manuel MD   acetaminophen-codeine (TYLENOL/CODEINE #3) 300-30 MG per tablet Take 1 tablet by mouth every 4 hours as needed for Pain.   Yes Ovidio Manuel MD   amLODIPine (NORVASC) 2.5 MG tablet Take 1 tablet by mouth daily 12/19/23   To Garcia MD   levothyroxine (SYNTHROID) 75 MCG tablet Take 1 tablet by mouth Daily  Patient not taking: Reported on 1/14/2024    ProviderOvidio MD     Scheduled Meds:   pantoprazole (PROTONIX) 40 mg in sodium chloride (PF) 0.9 % 10 mL injection  40 mg IntraVENous Daily    chlorhexidine  15 mL Mouth/Throat BID    artificial tears   Both Eyes Q4H    Carboxymeth-Glycerin-Polysorb  1 drop Both Eyes Q4H    cefepime  1,000 mg IntraVENous Q12H    insulin lispro  0-4 Units SubCUTAneous Q4H    dexAMETHasone  6 mg IntraVENous Q24H    sodium chloride flush  5-40 mL IntraVENous 2 times per day    budesonide  0.5 mg Nebulization BID RT    ipratropium 0.5 mg-albuterol 2.5 mg  1 Dose Inhalation Q4H RT    azithromycin  500 mg IntraVENous Q24H     Continuous Infusions:   fentaNYL 125 mcg/hr (01/15/24 0928)    dextrose      norepinephrine 7 mcg/min (01/15/24 0920)    
PULMONARY  CRITICAL CARE   SERVICE DAILY PROGRESS  NOTE     1/17/2024   Hospital  LOS: 4 days      Admit date- 1/13/2024       Initially admitted for -   Shortness of Breath (From [facility] for ams/sob, hypoxia on her normal NC 2-3L, full code)     Subjective:       Remains Intubated and sedated   Getting hypotensive this am   Hypoxemia is severe , continues to be on 100% Fio2     Review of Systems      Unable to obtain secondary to mental status. Pt is intubated and sedated                      Allergies:  Allergies   Allergen Reactions    Iodides Itching     IVP dye    Peanuts [Peanut Oil] Other (See Comments)     migraines    Penicillins      Doesn't recall reaction     Home Meds:  Prior to Admission medications    Medication Sig Start Date End Date Taking? Authorizing Provider   mirtazapine (REMERON) 15 MG tablet Take 0.5 tablets by mouth nightly   Yes Ovidio Manuel MD   acetaminophen-codeine (TYLENOL/CODEINE #3) 300-30 MG per tablet Take 1 tablet by mouth every 4 hours as needed for Pain.   Yes Ovidio Manuel MD   amLODIPine (NORVASC) 2.5 MG tablet Take 1 tablet by mouth daily 12/19/23   To Garcia MD   levothyroxine (SYNTHROID) 75 MCG tablet Take 1 tablet by mouth Daily  Patient not taking: Reported on 1/14/2024    ProviderOvidio MD     Scheduled Meds:   sodium phosphate IVPB (CENTRAL line)  30 mmol IntraVENous Once    vancomycin (VANCOCIN) intermittent dosing (placeholder)   Other RX Placeholder    docusate  100 mg Oral BID    pantoprazole (PROTONIX) 40 mg in sodium chloride (PF) 0.9 % 10 mL injection  40 mg IntraVENous Daily    chlorhexidine  15 mL Mouth/Throat BID    artificial tears   Both Eyes Q4H    cefepime  1,000 mg IntraVENous Q12H    insulin lispro  0-4 Units SubCUTAneous Q4H    dexAMETHasone  6 mg IntraVENous Q24H    sodium chloride flush  5-40 mL IntraVENous 2 times per day    budesonide  0.5 mg Nebulization BID RT    ipratropium 0.5 mg-albuterol 2.5 mg  1 Dose 
Patient is transferred to ICU bed 9 at this time via bed. Patient is wearing bipap 14/8 100% FIO2. Transported by 2 ACLS RN and RT.   
Patient tried on AIRVO 60L 100% per Dr. Buckley. SpO2 dropped from 89% to 80%, Dr. Buckley notified and instructed this RRT to place patient back o BIPAP for now. Suggested setting changes and Dr. Buckley said to leave on current settings for now. Patient now satting 87-90% on BIPAP.   
Patient was terminally extubated at 1340. Patient was placed on a 3L nasal cannula for comfort and is currently saturation 59%. Bother and nurse are at bedside. Will continue to monitor patient.   
Patient's pulse ox remains 87-88 for the last hour. Mouth care and suction performed, no secretions noted in ET tube.   
Pharmacy  Pharmacy Consultation Note  (Antibiotic Dosing and Monitoring)    Initial consult date: 1/16/2024  Consulting physician/provider: Dr. Natarajan  Drug: Vancomycin  Indication: Sepsis    Age/  Gender Height Weight IBW  Allergy Information   79 y.o./female 144.8 cm (4' 9\") 52.2 kg (115 lb)     Ideal body weight: 45.5 kg (100 lb 5.7 oz)  Adjusted ideal body weight: 48.8 kg (107 lb 11.1 oz)   Iodides, Peanuts [peanut oil], and Penicillins      Renal Function:  Recent Labs     01/17/24  1602 01/17/24  2207 01/18/24  0425   BUN 25* 24* 26*   CREATININE 0.6 0.6 0.6         Intake/Output Summary (Last 24 hours) at 1/18/2024 0838  Last data filed at 1/18/2024 0831  Gross per 24 hour   Intake 3766.5 ml   Output 1990 ml   Net 1776.5 ml         Vancomycin Monitoring:  Trough:  No results for input(s): \"VANCOTROUGH\" in the last 72 hours.  Random:    Recent Labs     01/17/24  0409 01/18/24  0425   VANCORANDOM 9.8 9.9       Recent vancomycin administrations                     vancomycin (VANCOCIN) 1,000 mg in sodium chloride 0.9 % 250 mL IVPB ([id number]) (mg) 1,000 mg New Bag 01/17/24 1206    vancomycin (VANCOCIN) 1,250 mg in sodium chloride 0.9 % 250 mL IVPB (mg) 1,250 mg New Bag 01/16/24 1401               Assessment:  Patient is a 79 y.o. female who has been initiated on vancomycin  Estimated Creatinine Clearance: 56 mL/min (based on SCr of 0.6 mg/dL). Baseline ~ 0.4 - 0.5 mg/dL  To dose vancomycin, pharmacy will be utilizing dosing based off of levels because of patient's renal impairment/insufficiency  1/18: SCr 0.6, random level=9.9 mcg/ml @ 0425, also receiving cefepime 1000 mg every 12 hours    Plan:  Give vancomycin 1000 mg IV x 1 dose  Random level tomorrow with AM labs  Will continue to monitor renal function   Pharmacy to follow      Sloane Reddy RPh  1/18/2024, 8:39 AM        
Pharmacy  Pharmacy Consultation Note  (Antibiotic Dosing and Monitoring)    Initial consult date: 1/16/2024  Consulting physician/provider: Dr. Natarajan  Drug: Vancomycin  Indication: Sepsis    Age/  Gender Height Weight IBW  Allergy Information   79 y.o./female 144.8 cm (4' 9\") 52.2 kg (115 lb)     Ideal body weight: 45.5 kg (100 lb 6.2 oz)  Adjusted ideal body weight: 49 kg (108 lb 1.1 oz)   Iodides, Peanuts [peanut oil], and Penicillins      Renal Function:  Recent Labs     01/16/24  2154 01/17/24  0409 01/17/24  1001   BUN 30* 26* 24*   CREATININE 0.6 0.6 0.6         Intake/Output Summary (Last 24 hours) at 1/17/2024 1144  Last data filed at 1/17/2024 0819  Gross per 24 hour   Intake 4536.77 ml   Output 1875 ml   Net 2661.77 ml         Vancomycin Monitoring:  Trough:  No results for input(s): \"VANCOTROUGH\" in the last 72 hours.  Random:    Recent Labs     01/17/24  0409   VANCORANDOM 9.8       Recent vancomycin administrations                     vancomycin (VANCOCIN) 1,250 mg in sodium chloride 0.9 % 250 mL IVPB (mg) 1,250 mg New Bag 01/16/24 1401             Assessment:  Patient is a 79 y.o. female who has been initiated on vancomycin  Estimated Creatinine Clearance: 56 mL/min (based on SCr of 0.6 mg/dL). Baseline ~ 0.4 - 0.5 mg/dL  To dose vancomycin, pharmacy will be utilizing dosing based off of levels because of patient's renal impairment/insufficiency    Plan:  Give vancomycin 1000 mg IV x 1 dose  Random level tomorrow with AM labs  Will continue to monitor renal function   Pharmacy to follow      Xi Jacobsen, PharmD, BCPS 1/17/2024 11:44 AM   Ext: [phone]      
Pharmacy Consultation Note    Consult date: 1/13/2024  Physician/provider: Dr. Lehman  Pharmacy has been consulted to evaluate criteria for COVID therapy.    Pharmacy consulted to evaluate patient for treatment according to [facility] P&T approved Covid-19 treatment criteria algorithm: currently on BiPAP and CRP is pending.   Will continue to follow for possible initiation of baricitinib (if renal function allows) vs tocilizumab.    Thank you for the consult,    Audrey Mattson, PharmD, BCPS 1/13/2024 8:53 PM   [phone]     
Pharmacy Consultation Note    Consult date: 1/14/2024  Physician/provider: Dr. Serrano  Pharmacy has been consulted to evaluate criteria for Tocilizumab therapy.    Based on the algorithm, the patient DOES currently meet [facility] P&T approved Covid-19 treatment criteria for Tocilizumab.    Thank you for the consult,  Audrey Mattson RPH    
Pharmacy Consultation Note  (Antibiotic Dosing and Monitoring)    Initial consult date: 1/16/2024  Consulting physician/provider: Dr. Natarajan  Drug: Vancomycin  Indication: Sepsis    Age/  Gender Height Weight IBW  Allergy Information   79 y.o./female 144.8 cm (4' 9\") 52.2 kg (115 lb)     Ideal body weight: 45.5 kg (100 lb 6.2 oz)  Adjusted ideal body weight: 48.6 kg (107 lb 3.7 oz)   Iodides, Peanuts [peanut oil], and Penicillins      Renal Function:  Recent Labs     01/15/24  2231 01/16/24  0408 01/16/24  1007   BUN 47* 45* 39*   CREATININE 1.0 0.9 0.8       Intake/Output Summary (Last 24 hours) at 1/16/2024 1115  Last data filed at 1/16/2024 0641  Gross per 24 hour   Intake 2994.95 ml   Output 1600 ml   Net 1394.95 ml       Vancomycin Monitoring:  Trough:  No results for input(s): \"VANCOTROUGH\" in the last 72 hours.  Random:    Recent Labs     01/14/24  0822   VANCORANDOM 16.4     Recent vancomycin administrations                     vancomycin (VANCOCIN) 1,250 mg in sodium chloride 0.9 % 250 mL IVPB (mg) 1,250 mg New Bag 01/13/24 1923             Assessment:  Patient is a 79 y.o. female who has been initiated on vancomycin  Estimated Creatinine Clearance: 42 mL/min (based on SCr of 0.8 mg/dL). Baseline ~ 0.4 - 0.5 mg/dL  To dose vancomycin, pharmacy will be utilizing dosing based off of levels because of patient's renal impairment/insufficiency    Plan:  Give vancomycin 1250 mg IV x 1 dose  Random level tomorrow with AM labs  Will continue to monitor renal function   Pharmacy to follow      Xi Jacobsen, Sonia, BCPS 1/16/2024 12:42 PM   Ext: [phone]      
Physical Therapy  Physical Therapy    Room #:   IC09/IC09-01    Date: 2024       Patient Name: Harsh Michaels  : 1945      MRN: [id number]     Patient unavailable for physical therapy treatment due to unavailable/not appropriate per nursing due to pt being on ventilator. Will attempt PT treatment at a later time. Thank you.      Doug Wilder, PT      
Progress Note  Date:2024       Room:[place] [address]  Patient Name:Harsh Michaels     YOB: 1945     Age:79 y.o.        Subjective    Subjective:  Symptoms:  Worsening.    Activity level: Impaired due to weakness.       Review of Systems   Reason unable to perform ROS: intubated.     Objective         Vitals Last 24 Hours:  TEMPERATURE:  Temp  Av.8 °F (36.6 °C)  Min: 97.2 °F (36.2 °C)  Max: 98.6 °F (37 °C)  RESPIRATIONS RANGE: Resp  Av.9  Min: 18  Max: 30  PULSE OXIMETRY RANGE: SpO2  Av.7 %  Min: 87 %  Max: 96 %  PULSE RANGE: Pulse  Av.3  Min: 63  Max: 112  BLOOD PRESSURE RANGE: Systolic (24hrs), Av , Min:76 , Max:124   ; Diastolic (24hrs), Av, Min:44, Max:90    I/O (24Hr):    Intake/Output Summary (Last 24 hours) at 2024  Last data filed at 2024 1800  Gross per 24 hour   Intake 1048.01 ml   Output 2690 ml   Net -1641.99 ml     Objective:  General Appearance:  In no acute distress (intubated and sedated).    Vital signs: (most recent): Blood pressure (!) 83/64, pulse 96, temperature 97.2 °F (36.2 °C), temperature source Bladder, resp. rate 20, height 1.448 m (4' 9\"), weight 42 kg (92 lb 11.2 oz), SpO2 94 %, not currently breastfeeding.    Output: Producing urine.    HEENT: Normal HEENT exam.    Lungs:  Normal effort and normal respiratory rate.  Breath sounds clear to auscultation.    Heart: Normal rate.  Regular rhythm.    Abdomen: Abdomen is soft and non-distended.  Bowel sounds are normal.   There is no abdominal tenderness.     Extremities: There is no dependent edema.    Pulses: Distal pulses are intact.    Pupils:  Pupils are equal, round, and reactive to light.      Labs/Imaging/Diagnostics    Labs:  CBC:  Recent Labs     24  1235 24  0014 24  0822   WBC 20.4* 24.1* 20.9*   RBC 5.09 4.33 4.51   HGB 16.7* 14.1 14.6   HCT 50.4* 44.1 45.6   MCV 99.0 101.8* 101.1*   RDW 14.3 14.4 14.6    310 304     CHEMISTRIES:  Recent Labs     
Respiratory therapy called for assistance for transport. Labs drawn by phlebotomist with assistance from this nurse due to patient uncooperative with care. Report called to ICU, RN  
Ryan Jane MD,    Your patient is on a medication that requires a renal dose adjustment.    Renal Function Assessment:    Date Body Weight IBW  Adjusted BW SCr  CrCl Dialysis status   1/14/2024 42 kg (92 lb 11.2 oz)  Ideal body weight: 45.5 kg (100 lb 5.7 oz) Serum creatinine: 1.4 mg/dL (H) 01/14/24 0822  Estimated creatinine clearance: 22 mL/min (A) N/a       Pharmacy has renally dose-adjusted the following medication(s):    Date Original Order Renally Adjusted Order   1/14/2024 Cefepime 2000mg q24h Cefepime 1000 mg q12h       These changes were made per protocol according to the [facility] Renal Function-Based Dose Adjustments Policy    *Please note this dose may need readjusted if your patient's renal function significantly improves.    Please contact pharmacy with any questions regarding these changes.    Monalisa Herr RPH  1/14/2024  11:12 AM       
Sedation interruption performed.  Time sedative infusions stopped: 0823  Time of wake up:   RASS: Macdonald Agitation Sedation Scale (RASS): Agitated-Frequent non-purposeful movement, fights ventilator (vent intolerance)  /71   Pulse (!) 103   Temp 99.3 °F (37.4 °C) (Bladder)   Resp (!) 40   Ht 1.448 m (4' 9\")   Wt 53.8 kg (118 lb 11.2 oz)   SpO2 93%   BMI 25.69 kg/m²   Neurological assessment: Patient responds to noxious stimuli only.  Time and rate sedatives resumed: 1028      Sedative(s) were re-instituted per provider's order (see MAR) due to the presence of the criteria for failure of sedation interruption (check all that apply):     Check box  if present Criteria for Failure of Sedation Interruption     [x] Sustained respiratory rate greater than 35 breaths/minute for 5 minutes or longer   [] Acute cardiac dysrhythmia   [] SpO2 less than 88% for 5 minutes or longer   [] Signs of increased work of breathing (accessory muscle use)   [] RASS greater than or equal to +2   [] ICP increase to greater than 20 mm Hg   [] End Tidal CO2 increases or decreases by greater than 5 mm Hg       Electronically signed by Bassam Martinez RN on 1/18/2024 at 10:29 AM   
Sedation interruption performed.  Time sedative infusions stopped: 1002  Time of wake up: 1002  RASS: Macdonald Agitation Sedation Scale (RASS): Agitated-Frequent non-purposeful movement, fights ventilator  BP (!) 74/57   Pulse 98   Temp 99.3 °F (37.4 °C) (Bladder)   Resp (!) 41   Ht 1.448 m (4' 9.01\")   Wt 54.3 kg (119 lb 9.6 oz)   SpO2 91%   BMI 25.87 kg/m²   Neurological assessment: Patient responds to noxious stimuli  Time and rate sedatives resumed: 1012 Patient's respirations 35-44.      Sedative(s) were re-instituted per provider's order (see MAR) due to the presence of the criteria for failure of sedation interruption (check all that apply):     Check box  if present Criteria for Failure of Sedation Interruption     [x] Sustained respiratory rate greater than 35 breaths/minute for 5 minutes or longer   [] Acute cardiac dysrhythmia   [] SpO2 less than 88% for 5 minutes or longer   [] Signs of increased work of breathing (accessory muscle use)   [] RASS greater than or equal to +2   [] ICP increase to greater than 20 mm Hg   [] End Tidal CO2 increases or decreases by greater than 5 mm Hg       Electronically signed by Bassam Martinez RN on 1/17/2024 at 10:15 AM   
Spoke to pt's spouse, Venkatesh - updated with patient's status and need for bilateral soft wrist restraints. He stated that the patient did not want any resuscitative measures but is agreeable to the ventilator at this time. Reviewed code status with him - Cardiac medications, NO; CPR, NO; Defibrillation, CPR; Ventilator, yes at this time. He has given consent for DNR-CCA. Dr. Natarajan updated. See new order    Nadine Buck RN  1/15/2024    
Spoke with Dr Serrano and orders received to transfer patient to ICU bed 9  
ipratropium 0.5 mg-albuterol 2.5 mg  1 Dose Inhalation Q4H RT     Continuous Infusions:   heparin (PORCINE) Infusion 15 Units/kg/hr (01/18/24 0944)    fentaNYL Stopped (01/18/24 0822)    dextrose      norepinephrine Stopped (01/15/24 1630)    midazolam Stopped (01/18/24 0822)    sodium chloride         Vitals:  /71   Pulse 81   Temp 99.3 °F (37.4 °C) (Bladder)   Resp 25   Ht 1.448 m (4' 9\")   Wt 53.8 kg (118 lb 11.2 oz)   SpO2 94%   BMI 25.69 kg/m²   Tmax:  CVP:        Intake/Output Summary (Last 24 hours) at 1/18/2024 1012  Last data filed at 1/18/2024 0944  Gross per 24 hour   Intake 3497.4 ml   Output 1990 ml   Net 1507.4 ml         Date 01/17/24 0701 - 01/18/24 0700 01/18/24 0701 - 01/19/24 0700   Shift 7783-3392 5318-8554 24 Hour Total 4473-6998 4159-5477 24 Hour Total   INTAKE   I.V. 665.1 358.8 1023.9 69.5  69.5   NG/GT 8195 612 8214 88  88   IV Piggyback 748.9 45.4 794.3      Shift Total 2645 1132.2 3777.2 157.5  157.5   OUTPUT   Urine 935 1050 1985 130  130   Shift Total 935 1050 1985 130  130   NET 1710 82.2 1792.2 27.5  27.5           EXAM:  Vitals:    01/18/24 0710 01/18/24 0800 01/18/24 0900 01/18/24 1009   BP:  93/66 107/71    Pulse: 83 79 78 81   Resp: 27 28 24 25   Temp:  99.3 °F (37.4 °C)     TempSrc:  Bladder     SpO2: (!) 88% 90% 98% 94%   Weight:       Height:           General-  Pt sedated and intubated .   Head and neck- ETT # 7.5 , RASS of -1 , MARCUS, MMM , AT/NC, No JVD. No thyromegaly, trachea midline.   Pulmonary - Bilateral air entry adequate with decreased on left base. Few expiratory rhonchi , no crackles.   Cardiovascular-  S1, S2 , Mo, No rubs or gallops.    Abdomen-  Soft , non distended , Bowel sounds present. No organomegaly   Neuro- Intubated and sedated,` Pupils equal and reactive.   Extremities- 1 + pitting edema , No deformities   Skin- warm , Capillary refill normal , no new rashes.        Ventilator Settings:    Vent Mode: AC/VC  Resp Rate (Set): 28 bpm   Vt (Set, 
  - Continue Enhanced airborne and droplet isolation /precautions  - D dimer, ferritin, CRP, LDH elevated   - Continue supportive care and supplemental o2 via MV  , wean as tolerated   - CXR - reviewed , follow Q72 hrs or with clinical deterioration   - Respiratory viral panel positive for COVID   - Covid specific therapies - on  Dexamethasone ,  Remdesivir not indicated , S/p Tocilizumab X 1   - On D5 water for hypernatremia , serial BMPs   - Continues to be on cefepime , D/c azithro , add vanc . Has new leukocytosis and fever   - Repeat blood and sputum cultures   - continue SAT but  not a candidate for weaning at this time secondary to substantial ventilator requirements     Prophylaxis:  Stress ulcer: [x] PPI Agent [] H2RA [] Sucralfate [] Other:   VTE: [x] Enoxaparin - therapeutic  [] SC Heparin  [] SCD      Critical care time spent excluding separately billable procedures is 33 minutes.  Electronically signed by Zoran Natarajan MD on 1/16/2024 at 10:07 AM       
Drainage tube clipped to bed;Catheter secured to thigh;Tamper seal intact;Bag below bladder;Bag not on floor;Lack of dependent loop in tubing;Drainage bag less than half full 01/18/24 0730   Status Draining;Patent 01/18/24 0730   Output (mL) 130 mL 01/18/24 0831     Airway:  ETT  (Active)   $ Intubation Emergent  $ Yes 01/14/24 0957   Secured At 22 cm 01/18/24 0730   Measured From Lips 01/18/24 0730   ETT Placement Right 01/18/24 0730   Secured By Commercial tube saab 01/18/24 0730   Site Assessment Dry 01/18/24 0730   Tie/Saab Changed No 01/18/24 0730             CURRENT MEDICATIONS     Current Facility-Administered Medications:     vancomycin (VANCOCIN) 1,000 mg in sodium chloride 0.9 % 250 mL IVPB ([id number]), 1,000 mg, IntraVENous, Once, Zoran Natarajan MD    methylPREDNISolone sodium succ (SOLU-MEDROL) 125 mg in sterile water 2 mL injection, 125 mg, IntraVENous, Q8H, Zoran Natarajan MD, 125 mg at 01/18/24 0430    fluconazole (DIFLUCAN) in 0.9 % sodium chloride IVPB 400 mg, 400 mg, IntraVENous, Q24H, Alfonso Carrillo, APRN - CNS, Stopped at 01/17/24 1715    heparin 25,000 units in dextrose 5% 250 mL (premix) infusion, 5-30 Units/kg/hr, IntraVENous, Continuous, Zoran Natarajan MD, Last Rate: 8 mL/hr at 01/18/24 0944, 15 Units/kg/hr at 01/18/24 0944    heparin (porcine) injection 2,130 Units, 40 Units/kg, IntraVENous, PRN, Zoran Natarajan MD    heparin (porcine) injection 4,260 Units, 80 Units/kg, IntraVENous, PRN, Zoran Natarajan MD    vancomycin (VANCOCIN) intermittent dosing (placeholder), , Other, RX Placeholder, Zoran Natarajan MD    docusate (COLACE) 50 MG/5ML liquid 100 mg, 100 mg, Oral, BID, Zoran Natarajan MD, 100 mg at 01/17/24 2004    bisacodyl (DULCOLAX) suppository 10 mg, 10 mg, Rectal, Daily PRN, Zoran Natarajan MD    pantoprazole (PROTONIX) 40 mg in sodium chloride (PF) 0.9 % 10 mL injection, 40 mg, IntraVENous, Daily, Ly Guzman, SHAN - CNP, 40 mg at 01/17/24 0857    chlorhexidine 
prochlorperazine (COMPAZINE) injection 10 mg, 10 mg, IntraVENous, Q6H PRN, Ryan Buckley MD    budesonide (PULMICORT) nebulizer suspension 500 mcg, 0.5 mg, Nebulization, BID RT, Ryan Buckley MD, 500 mcg at 01/14/24 0618    ipratropium 0.5 mg-albuterol 2.5 mg (DUONEB) nebulizer solution 1 Dose, 1 Dose, Inhalation, Q4H RT, Ryan Buckley MD, 1 Dose at 01/14/24 1018    azithromycin (ZITHROMAX) 500 mg in sodium chloride 0.9 % 250 mL IVPB ([id number]), 500 mg, IntraVENous, Q24H, Charu Lehman MD, Stopped at 01/14/24 0116    heparin (porcine) injection 4,180 Units, 80 Units/kg, IntraVENous, PRN, Rickie Henrietta H, DO    heparin (porcine) injection 2,090 Units, 40 Units/kg, IntraVENous, PRN, Santana Damianssica H, DO    [Held by provider] heparin 25,000 units in dextrose 5% 250 mL (premix) infusion, 5-30 Units/kg/hr, IntraVENous, Continuous, Henrietta Damian H, DO, Stopped at 01/14/24 0915    dextrose 5 % solution, , IntraVENous, Continuous, Santana Damianssica H, DO, Last Rate: 75 mL/hr at 01/13/24 2321, New Bag at 01/13/24 2321  DIAGNOSTIC RESULTS     Results       Procedure Component Value Units Date/Time    Culture, Blood 1 [[id number]] Collected: 01/14/24 0014    Order Status: Sent Specimen: Blood Updated: 01/14/24 0019    Culture, MRSA, Screening [[id number]]     Order Status: Sent Specimen: Nares     Respiratory Panel, Molecular, with COVID-19 (Restricted: peds pts or suitable admitted adults) [[id number]]     Order Status: Canceled Specimen: Nasopharyngeal     STREP PNEUMONIAE ANTIGEN [[id number]]     Order Status: Sent Specimen: Urine, clean catch     LEGIONELLA ANTIGEN, URINE [[id number]]     Order Status: Sent Specimen: Urine     Blood Culture 1 [[id number]] Collected: 01/13/24 1235    Order Status: Completed Specimen: Blood Updated: 01/13/24 1636     Specimen Description .BLOOD     Special Requests          Culture NO GROWTH <24 HRS    Culture, Blood 2 [[id number]] Collected: 01/13/24 1235    Order Status: 
Not Detected     Parainfluenza 4 PCR Not Detected     Resp Syncytial Virus PCR Not Detected     Bordetella parapertussis by PCR Not Detected     B Pertussis by PCR Not Detected     Chlamydia pneumoniae By PCR Not Detected     Mycoplasma pneumo by PCR Not Detected     Comment: Performed by multiplexed nucleic acid assay.               Lab Results   Component Value Date/Time    BC 5 Days- no growth 04/01/2018 12:40 PM    BLOODCULT2 5 Days- no growth 04/01/2018 12:50 PM     No results for input(s): \"CDIFFTOXANT\" in the last 72 hours.  No results found for: \"WNDABS\"  Recent Labs     01/14/24  0014 01/14/24  0822 01/15/24  0441 01/16/24  0408   WBC 24.1* 20.9* 23.1* 31.0*    304 268  --        Recent Labs     01/15/24  0441 01/15/24  1558 01/16/24  0408 01/16/24  1007 01/16/24  1600   *   < > 151* 147* 147*   K 3.8   < > 4.2 4.3 4.1   *   < > 120* 115* 116*   CO2 25   < > 25 25 25   BUN 69*   < > 45* 39* 34*   CREATININE 1.4*   < > 0.9 0.8 0.8   GLUCOSE 216*   < > 178* 184* 117*   PROT 6.2*  --  5.8*  --   --    LABALBU 3.2*  --  3.1*  --   --    CALCIUM 8.9   < > 8.6 8.8 8.7   BILITOT 0.4  --  0.4  --   --    ALKPHOS 186*  --  189*  --   --    AST 53*  --  134*  --   --    ALT 63*  --  122*  --   --     < > = values in this interval not displayed.       Lab Results   Component Value Date    SEDRATE 29 (H) 01/14/2024     Lab Results   Component Value Date    .0 (H) 01/14/2024    .0 (H) 01/13/2024     Lab Results   Component Value Date    PROCAL 0.31 (H) 01/14/2024    PROCAL 1.01 (H) 01/13/2024     Recent Labs     01/13/24  1908 01/14/24  0822 01/15/24  0441 01/16/24  0408   PROCAL  --  0.31*  --   --    SEDRATE  --  29*  --   --    .0* 179.0*  --   --    FERRITIN  --  1,802  --   --    *  --   --   --    DDIMER >5,250*  --   --   --    FIBRINOGEN  --  327  --   --    INR  --  1.2  --   --    PROTIME  --  13.1*  --   --    AST  --  70* 53* 134*   ALT  --  75* 63* 122*

## 2024-01-21 LAB
MICROORGANISM SPEC CULT: ABNORMAL
MICROORGANISM SPEC CULT: NORMAL
MICROORGANISM SPEC CULT: NORMAL
SERVICE CMNT-IMP: NORMAL
SERVICE CMNT-IMP: NORMAL
SPECIMEN DESCRIPTION: ABNORMAL
SPECIMEN DESCRIPTION: NORMAL
SPECIMEN DESCRIPTION: NORMAL